# Patient Record
Sex: FEMALE | Race: WHITE | NOT HISPANIC OR LATINO | Employment: FULL TIME | URBAN - METROPOLITAN AREA
[De-identification: names, ages, dates, MRNs, and addresses within clinical notes are randomized per-mention and may not be internally consistent; named-entity substitution may affect disease eponyms.]

---

## 2017-01-18 ENCOUNTER — MYC REFILL (OUTPATIENT)
Dept: FAMILY MEDICINE | Facility: CLINIC | Age: 37
End: 2017-01-18

## 2017-01-18 DIAGNOSIS — F33.1 MODERATE RECURRENT MAJOR DEPRESSION (H): Primary | ICD-10-CM

## 2017-01-18 DIAGNOSIS — F33.1 MODERATE RECURRENT MAJOR DEPRESSION (H): ICD-10-CM

## 2017-01-18 RX ORDER — BUPROPION HYDROCHLORIDE 300 MG/1
300 TABLET ORAL EVERY MORNING
Qty: 90 TABLET | Refills: 3 | Status: CANCELLED | OUTPATIENT
Start: 2017-01-18

## 2017-01-18 RX ORDER — BUPROPION HYDROCHLORIDE 300 MG/1
300 TABLET ORAL EVERY MORNING
Qty: 90 TABLET | Refills: 0 | Status: SHIPPED | OUTPATIENT
Start: 2017-01-18 | End: 2017-04-20

## 2017-01-18 NOTE — TELEPHONE ENCOUNTER
Bupropion/ wellbutrin XL       Last Written Prescription Date: 1/18/17  Last Fill Quantity: 90; # refills: 0  Last Office Visit with [facility], [facility] or [facility] prescribing provider:  9/2/16   Next 5 appointments (look out 90 days)     Mar 21, 2017 11:00 AM   Return Visit with Andrea Benítez MD   [facility] ([facility])    [address] [place] [id number] [phone]                   Last PHQ-9 score on record=   PHQ-9 SCORE 9/2/2016   Total Score -   Total Score 2       AST       23   12/15/2016  ALT       31   12/15/2016  Corinna Larson RNC

## 2017-01-18 NOTE — TELEPHONE ENCOUNTER
Bupropion       Last Written Prescription Date: 12/31/15  Last Fill Quantity: 90; # refills: 3  Last Office Visit with [facility], [facility] or [facility] prescribing provider:  09/02/16   Next 5 appointments (look out 90 days)     Mar 21, 2017 11:00 AM   Return Visit with Andrea Benítez MD   [facility] ([facility]    [address] [place] [id number] [phone]                   Last PHQ-9 score on record=   PHQ-9 SCORE 9/2/2016   Total Score -   Total Score 2       AST       23   12/15/2016  ALT       31   12/15/2016

## 2017-01-18 NOTE — TELEPHONE ENCOUNTER
Message from [employer]:  Original authorizing provider: Fco Medeiros MD    Vida Whitfield would like a refill of the following medications:  buPROPion (WELLBUTRIN XL) 300 MG 24 hr tablet [Fco Medeiros MD]    Preferred pharmacy: [facility] - [place] - [address]    Comment:

## 2017-01-26 DIAGNOSIS — M06.09 RHEUMATOID ARTHRITIS OF MULTIPLE SITES WITH NEGATIVE RHEUMATOID FACTOR (H): ICD-10-CM

## 2017-01-26 DIAGNOSIS — Z79.899 HIGH RISK MEDICATION USE: ICD-10-CM

## 2017-01-26 LAB
ALBUMIN SERPL-MCNC: 3.5 G/DL (ref 3.4–5)
ALP SERPL-CCNC: 41 U/L (ref 40–150)
ALT SERPL W P-5'-P-CCNC: 20 U/L (ref 0–50)
AST SERPL W P-5'-P-CCNC: 15 U/L (ref 0–45)
BASOPHILS # BLD AUTO: 0 10E9/L (ref 0–0.2)
BASOPHILS NFR BLD AUTO: 0.6 %
BILIRUB DIRECT SERPL-MCNC: 0.1 MG/DL (ref 0–0.2)
BILIRUB SERPL-MCNC: 0.4 MG/DL (ref 0.2–1.3)
CREAT SERPL-MCNC: 1.12 MG/DL (ref 0.52–1.04)
DIFFERENTIAL METHOD BLD: NORMAL
EOSINOPHIL # BLD AUTO: 0 10E9/L (ref 0–0.7)
EOSINOPHIL NFR BLD AUTO: 0 %
ERYTHROCYTE [DISTWIDTH] IN BLOOD BY AUTOMATED COUNT: 13.7 % (ref 10–15)
GFR SERPL CREATININE-BSD FRML MDRD: 55 ML/MIN/1.7M2
HCT VFR BLD AUTO: 41.3 % (ref 35–47)
HGB BLD-MCNC: 13.9 G/DL (ref 11.7–15.7)
LYMPHOCYTES # BLD AUTO: 3 10E9/L (ref 0.8–5.3)
LYMPHOCYTES NFR BLD AUTO: 46.8 %
MCH RBC QN AUTO: 30.5 PG (ref 26.5–33)
MCHC RBC AUTO-ENTMCNC: 33.7 G/DL (ref 31.5–36.5)
MCV RBC AUTO: 91 FL (ref 78–100)
MONOCYTES # BLD AUTO: 0.8 10E9/L (ref 0–1.3)
MONOCYTES NFR BLD AUTO: 12.1 %
NEUTROPHILS # BLD AUTO: 2.6 10E9/L (ref 1.6–8.3)
NEUTROPHILS NFR BLD AUTO: 40.5 %
PLATELET # BLD AUTO: 189 10E9/L (ref 150–450)
PROT SERPL-MCNC: 7.1 G/DL (ref 6.8–8.8)
RBC # BLD AUTO: 4.56 10E12/L (ref 3.8–5.2)
WBC # BLD AUTO: 6.4 10E9/L (ref 4–11)

## 2017-01-26 PROCEDURE — 82565 ASSAY OF CREATININE: CPT | Performed by: INTERNAL MEDICINE

## 2017-01-26 PROCEDURE — 80076 HEPATIC FUNCTION PANEL: CPT | Performed by: INTERNAL MEDICINE

## 2017-01-26 PROCEDURE — 36415 COLL VENOUS BLD VENIPUNCTURE: CPT | Performed by: INTERNAL MEDICINE

## 2017-01-26 PROCEDURE — 85025 COMPLETE CBC W/AUTO DIFF WBC: CPT | Performed by: INTERNAL MEDICINE

## 2017-02-16 ENCOUNTER — TELEPHONE (OUTPATIENT)
Dept: LAB | Facility: CLINIC | Age: 37
End: 2017-02-16

## 2017-02-16 DIAGNOSIS — Z79.899 HIGH RISK MEDICATION USE: ICD-10-CM

## 2017-02-16 DIAGNOSIS — M06.09 RHEUMATOID ARTHRITIS OF MULTIPLE SITES WITH NEGATIVE RHEUMATOID FACTOR (H): ICD-10-CM

## 2017-02-16 LAB
ALBUMIN SERPL-MCNC: 3.6 G/DL (ref 3.4–5)
ALP SERPL-CCNC: 45 U/L (ref 40–150)
ALT SERPL W P-5'-P-CCNC: 25 U/L (ref 0–50)
AST SERPL W P-5'-P-CCNC: 15 U/L (ref 0–45)
BASOPHILS # BLD AUTO: 0 10E9/L (ref 0–0.2)
BASOPHILS NFR BLD AUTO: 0.5 %
BILIRUB DIRECT SERPL-MCNC: <0.1 MG/DL (ref 0–0.2)
BILIRUB SERPL-MCNC: 0.5 MG/DL (ref 0.2–1.3)
CREAT SERPL-MCNC: 1.08 MG/DL (ref 0.52–1.04)
CRP SERPL-MCNC: 11.1 MG/L (ref 0–8)
DIFFERENTIAL METHOD BLD: NORMAL
EOSINOPHIL # BLD AUTO: 0 10E9/L (ref 0–0.7)
EOSINOPHIL NFR BLD AUTO: 0 %
ERYTHROCYTE [DISTWIDTH] IN BLOOD BY AUTOMATED COUNT: 13.8 % (ref 10–15)
ERYTHROCYTE [SEDIMENTATION RATE] IN BLOOD BY WESTERGREN METHOD: 6 MM/H (ref 0–20)
GFR SERPL CREATININE-BSD FRML MDRD: 57 ML/MIN/1.7M2
HCT VFR BLD AUTO: 41.4 % (ref 35–47)
HGB BLD-MCNC: 14 G/DL (ref 11.7–15.7)
LYMPHOCYTES # BLD AUTO: 2.9 10E9/L (ref 0.8–5.3)
LYMPHOCYTES NFR BLD AUTO: 45.4 %
MCH RBC QN AUTO: 30.9 PG (ref 26.5–33)
MCHC RBC AUTO-ENTMCNC: 33.8 G/DL (ref 31.5–36.5)
MCV RBC AUTO: 91 FL (ref 78–100)
MONOCYTES # BLD AUTO: 0.8 10E9/L (ref 0–1.3)
MONOCYTES NFR BLD AUTO: 11.9 %
NEUTROPHILS # BLD AUTO: 2.7 10E9/L (ref 1.6–8.3)
NEUTROPHILS NFR BLD AUTO: 42.2 %
PLATELET # BLD AUTO: 194 10E9/L (ref 150–450)
PROT SERPL-MCNC: 7.1 G/DL (ref 6.8–8.8)
RBC # BLD AUTO: 4.53 10E12/L (ref 3.8–5.2)
WBC # BLD AUTO: 6.4 10E9/L (ref 4–11)

## 2017-02-16 PROCEDURE — 85652 RBC SED RATE AUTOMATED: CPT | Performed by: INTERNAL MEDICINE

## 2017-02-16 PROCEDURE — 86140 C-REACTIVE PROTEIN: CPT | Performed by: INTERNAL MEDICINE

## 2017-02-16 PROCEDURE — 80076 HEPATIC FUNCTION PANEL: CPT | Performed by: INTERNAL MEDICINE

## 2017-02-16 PROCEDURE — 36415 COLL VENOUS BLD VENIPUNCTURE: CPT | Performed by: INTERNAL MEDICINE

## 2017-02-16 PROCEDURE — 85025 COMPLETE CBC W/AUTO DIFF WBC: CPT | Performed by: INTERNAL MEDICINE

## 2017-02-16 PROCEDURE — 82565 ASSAY OF CREATININE: CPT | Performed by: INTERNAL MEDICINE

## 2017-02-19 DIAGNOSIS — M06.09 RHEUMATOID ARTHRITIS OF MULTIPLE SITES WITH NEGATIVE RHEUMATOID FACTOR (H): ICD-10-CM

## 2017-02-19 RX ORDER — METHOTREXATE 2.5 MG/1
15 TABLET ORAL WEEKLY
Qty: 72 TABLET | Refills: 0 | Status: SHIPPED | OUTPATIENT
Start: 2017-02-19 | End: 2017-03-29

## 2017-02-19 NOTE — PROGRESS NOTES
"MyChart message sent:  \"Ms. Whitfield,    Your labs show a persistent renal insufficiency.   Please reduce methotrexate to 15mg (6 tablets) once weekly.      Sincerely,  Andrea Benítez MD  2/19/2017 7:45 AM\""

## 2017-03-21 ENCOUNTER — TELEPHONE (OUTPATIENT)
Dept: LAB | Facility: CLINIC | Age: 37
End: 2017-03-21

## 2017-03-21 ENCOUNTER — MYC MEDICAL ADVICE (OUTPATIENT)
Dept: FAMILY MEDICINE | Facility: CLINIC | Age: 37
End: 2017-03-21

## 2017-03-21 ENCOUNTER — MYC REFILL (OUTPATIENT)
Dept: FAMILY MEDICINE | Facility: CLINIC | Age: 37
End: 2017-03-21

## 2017-03-21 DIAGNOSIS — M19.90 INFLAMMATORY ARTHRITIS: Primary | ICD-10-CM

## 2017-03-21 DIAGNOSIS — F33.1 MODERATE RECURRENT MAJOR DEPRESSION (H): ICD-10-CM

## 2017-03-21 DIAGNOSIS — Z79.899 HIGH RISK MEDICATION USE: ICD-10-CM

## 2017-03-21 RX ORDER — BUSPIRONE HYDROCHLORIDE 15 MG/1
15 TABLET ORAL 2 TIMES DAILY
Qty: 180 TABLET | Refills: 1 | Status: SHIPPED | OUTPATIENT
Start: 2017-03-21 | End: 2017-08-30

## 2017-03-21 ASSESSMENT — ANXIETY QUESTIONNAIRES
GAD7 TOTAL SCORE: 3
GAD7 TOTAL SCORE: 3
7. FEELING AFRAID AS IF SOMETHING AWFUL MIGHT HAPPEN: 0 = NOT AT ALL

## 2017-03-21 ASSESSMENT — PATIENT HEALTH QUESTIONNAIRE - PHQ9
10. IF YOU CHECKED OFF ANY PROBLEMS, HOW DIFFICULT HAVE THESE PROBLEMS MADE IT FOR YOU TO DO YOUR WORK, TAKE CARE OF THINGS AT HOME, OR GET ALONG WITH OTHER PEOPLE: SOMEWHAT DIFFICULT
SUM OF ALL RESPONSES TO PHQ QUESTIONS 1-9: 6

## 2017-03-21 NOTE — TELEPHONE ENCOUNTER
PHQ-9 score:    PHQ-9 SCORE 3/21/2017   Total Score -   Total Score MyChart 6 (Mild depression)   Total Score -           JANET-7 SCORE 12/31/2015 9/2/2016 3/21/2017   Total Score - - -   Total Score - - 3 (minimal anxiety)   Total Score 0 1 -     Refilled per protocol.   Corinna Larson RNC

## 2017-03-21 NOTE — TELEPHONE ENCOUNTER
Buspirone/ buspar       Last Written Prescription Date: 12/31/15  Last Fill Quantity: 180; # refills: 3  Last Office Visit with [facility], [facility] or  [facility] prescribing provider:  9/2/16   Next 5 appointments (look out 90 days)     Mar 22, 2017  2:20 PM CDT   Return Visit with Andrea Benítez MD   [facility] ([place] [id number] [facility] [address]   [phone]                   Last PHQ-9 score on record=   PHQ-9 SCORE 9/2/2016   Total Score -   Total Score 2       Lab Results   Component Value Date    AST 15 02/16/2017     Lab Results   Component Value Date    ALT 25 02/16/2017     Sent her the PHQ9 and GAD7 to complete and return for refill consideration, per protocol.   Corinna Larson RNC

## 2017-03-21 NOTE — TELEPHONE ENCOUNTER
Message from [employer]:  Original authorizing provider: Fco Medeiros MD    Vida Whitfield would like a refill of the following medications:  busPIRone (BUSPAR) 15 MG tablet [Fco Medeiros MD]    Preferred pharmacy: [facility] - [place], [place], [place] - [address]    Comment:

## 2017-03-22 DIAGNOSIS — Z79.899 HIGH RISK MEDICATION USE: ICD-10-CM

## 2017-03-22 DIAGNOSIS — M19.90 INFLAMMATORY ARTHRITIS: ICD-10-CM

## 2017-03-22 LAB
ALBUMIN SERPL-MCNC: 3.7 G/DL (ref 3.4–5)
ALP SERPL-CCNC: 56 U/L (ref 40–150)
ALT SERPL W P-5'-P-CCNC: 41 U/L (ref 0–50)
AST SERPL W P-5'-P-CCNC: 23 U/L (ref 0–45)
BASOPHILS # BLD AUTO: 0.1 10E9/L (ref 0–0.2)
BASOPHILS NFR BLD AUTO: 0.9 %
BILIRUB DIRECT SERPL-MCNC: 0.1 MG/DL (ref 0–0.2)
BILIRUB SERPL-MCNC: 0.5 MG/DL (ref 0.2–1.3)
CREAT SERPL-MCNC: 0.94 MG/DL (ref 0.52–1.04)
CRP SERPL-MCNC: 4 MG/L (ref 0–8)
DIFFERENTIAL METHOD BLD: NORMAL
EOSINOPHIL # BLD AUTO: 0 10E9/L (ref 0–0.7)
EOSINOPHIL NFR BLD AUTO: 0.3 %
ERYTHROCYTE [DISTWIDTH] IN BLOOD BY AUTOMATED COUNT: 13.7 % (ref 10–15)
ERYTHROCYTE [SEDIMENTATION RATE] IN BLOOD BY WESTERGREN METHOD: 5 MM/H (ref 0–20)
GFR SERPL CREATININE-BSD FRML MDRD: 68 ML/MIN/1.7M2
HCT VFR BLD AUTO: 40.6 % (ref 35–47)
HGB BLD-MCNC: 14.3 G/DL (ref 11.7–15.7)
LYMPHOCYTES # BLD AUTO: 2.8 10E9/L (ref 0.8–5.3)
LYMPHOCYTES NFR BLD AUTO: 41.5 %
MCH RBC QN AUTO: 32.4 PG (ref 26.5–33)
MCHC RBC AUTO-ENTMCNC: 35.2 G/DL (ref 31.5–36.5)
MCV RBC AUTO: 92 FL (ref 78–100)
MONOCYTES # BLD AUTO: 0.7 10E9/L (ref 0–1.3)
MONOCYTES NFR BLD AUTO: 9.5 %
NEUTROPHILS # BLD AUTO: 3.3 10E9/L (ref 1.6–8.3)
NEUTROPHILS NFR BLD AUTO: 47.8 %
PLATELET # BLD AUTO: 176 10E9/L (ref 150–450)
PROT SERPL-MCNC: 7 G/DL (ref 6.8–8.8)
RBC # BLD AUTO: 4.41 10E12/L (ref 3.8–5.2)
WBC # BLD AUTO: 6.8 10E9/L (ref 4–11)

## 2017-03-22 PROCEDURE — 86140 C-REACTIVE PROTEIN: CPT | Performed by: INTERNAL MEDICINE

## 2017-03-22 PROCEDURE — 85652 RBC SED RATE AUTOMATED: CPT | Performed by: INTERNAL MEDICINE

## 2017-03-22 PROCEDURE — 80076 HEPATIC FUNCTION PANEL: CPT | Performed by: INTERNAL MEDICINE

## 2017-03-22 PROCEDURE — 82565 ASSAY OF CREATININE: CPT | Performed by: INTERNAL MEDICINE

## 2017-03-22 PROCEDURE — 36415 COLL VENOUS BLD VENIPUNCTURE: CPT | Performed by: INTERNAL MEDICINE

## 2017-03-22 PROCEDURE — 85025 COMPLETE CBC W/AUTO DIFF WBC: CPT | Performed by: INTERNAL MEDICINE

## 2017-03-22 ASSESSMENT — ANXIETY QUESTIONNAIRES: GAD7 TOTAL SCORE: 3

## 2017-03-22 ASSESSMENT — PATIENT HEALTH QUESTIONNAIRE - PHQ9: SUM OF ALL RESPONSES TO PHQ QUESTIONS 1-9: 6

## 2017-03-29 ENCOUNTER — OFFICE VISIT (OUTPATIENT)
Dept: RHEUMATOLOGY | Facility: CLINIC | Age: 37
End: 2017-03-29
Payer: COMMERCIAL

## 2017-03-29 VITALS
WEIGHT: 223.4 LBS | BODY MASS INDEX: 31.98 KG/M2 | OXYGEN SATURATION: 98 % | HEART RATE: 71 BPM | SYSTOLIC BLOOD PRESSURE: 127 MMHG | HEIGHT: 70 IN | DIASTOLIC BLOOD PRESSURE: 75 MMHG

## 2017-03-29 DIAGNOSIS — M06.09 RHEUMATOID ARTHRITIS OF MULTIPLE SITES WITH NEGATIVE RHEUMATOID FACTOR (H): Primary | ICD-10-CM

## 2017-03-29 DIAGNOSIS — Z79.899 HIGH RISK MEDICATION USE: ICD-10-CM

## 2017-03-29 PROCEDURE — 99213 OFFICE O/P EST LOW 20 MIN: CPT | Performed by: INTERNAL MEDICINE

## 2017-03-29 RX ORDER — SULFASALAZINE 500 MG/1
1000 TABLET, DELAYED RELEASE ORAL 2 TIMES DAILY
Qty: 360 TABLET | Refills: 1 | Status: SHIPPED | OUTPATIENT
Start: 2017-03-29 | End: 2017-07-14

## 2017-03-29 RX ORDER — METHOTREXATE 2.5 MG/1
15 TABLET ORAL WEEKLY
Qty: 72 TABLET | Refills: 1 | Status: SHIPPED | OUTPATIENT
Start: 2017-03-29 | End: 2017-07-14

## 2017-03-29 NOTE — MR AVS SNAPSHOT
After Visit Summary   3/29/2017    Vida Whitfield    MRN: [id number]           Patient Information     Date Of Birth          1980        Visit Information        Provider Department      3/29/2017 1:40 PM Andrea Benítez MD [facility]        Today's Diagnoses     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 factor (H)    -  1    High risk medication use          Care Instructions      Dr. Benítez s Rheumatology Clinics  Locations Clinic Hours Telephone Number   [facility] [place]  [address]  [place] [address]     Wednesday: 7:20AM - 4:00PM  Thursday:     7:20AM - 4:00PM  Friday:          7:20AM - 11:00AM       To schedule an appointment with  Dr. Benítez,  please contact  Specialty [phone]       [facility]  [address]  [place] [address]       Monday:       7:20AM - 4:00PM      [facility] [place]  [address]  [place] [address]       Tuesday:      7:20AM - 4:00PM          Thank you!    Nicolette Sarmiento CMA            Follow-ups after your visit        Follow-up notes from your care team     Return in about 3 months (around 2017) for f/u RA.      Your next 10 appointments already scheduled     2017  1:40 PM CDT   Return Visit with Andrea Benítez MD   [facility] ([facility])    [address] [address]   [phone]              Future tests that were ordered for you today     Open Future Orders        Priority Expected Expires Ordered    CBC with platelets differential Routine 2017 2017 3/29/2017    Creatinine Routine 2017 2017 3/29/2017    Erythrocyte sedimentation rate auto Routine 2017 2017 3/29/2017    CRP inflammation Routine 2017 2017 3/29/2017    Hepatic panel Routine 2017 2017 3/29/2017            Who to contact     If you have questions or need follow up information about today's  "clinic visit or your schedule please contact [facility] [place] [facility] directly at [phone].  Normal or non-critical lab and imaging results will be communicated to you by MyChart, letter or phone within 4 business days after the clinic has received the results. If you do not hear from us within 7 days, please contact the clinic through [employer] or phone. If you have a critical or abnormal lab result, we will notify you by phone as soon as possible.  Submit refill requests through [employer] or call your pharmacy and they will forward the refill request to us. Please allow 3 business days for your refill to be completed.          Additional Information About Your Visit        [employer]et Information     [employer] gives you secure access to your electronic health record. If you see a primary care provider, you can also send messages to your care team and make appointments. If you have questions, please call your primary care clinic.  If you do not have a primary care provider, please call [phone] and they will assist you.        Care EveryWhere ID     This is your Care EveryWhere ID. This could be used by other organizations to access your [facility] medical records  [id number]        Your Vitals Were     Pulse Height Pulse Oximetry BMI (Body Mass Index)          71 1.778 m (5' 10\") 98% 32.05 kg/m2         Blood Pressure from Last 3 Encounters:   03/29/17 127/75   12/20/16 121/75   09/20/16 133/75    Weight from Last 3 Encounters:   03/29/17 101.3 kg (223 lb 6.4 oz)   12/20/16 99.7 kg (219 lb 12.8 oz)   09/20/16 98.4 kg (217 lb)                 Today's Medication Changes          These changes are accurate as of: 3/29/17  2:08 PM.  If you have any questions, ask your nurse or doctor.               These medicines have changed or have updated prescriptions.        Dose/Directions    sulfaSALAzine  MG EC tablet   Commonly known as:  AZULFIDINE EN   This may have changed:    - how much to take  - how to take this  - " when to take this  - additional instructions   Used for:  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 factor (H)   Changed by:  Andrea Benítez MD        Dose:  1000 mg   Take 2 tablets (1,000 mg) by mouth 2 times daily   Quantity:  360 tablet   Refills:  1            Where to get your medicines      These medications were sent to [facility] [place] - [place], [place] - [address]  [address] [address]     Phone:  [phone]     methotrexate sodium 2.5 MG Tabs    sulfaSALAzine  MG EC tablet                Primary Care Provider Office Phone # Fax #    Fco Joby Medeiros [phone]       [facility] [address] [address]        Thank you!     Thank you for choosing [facility]  for your care. Our goal is always to provide you with excellent care. Hearing back from our patients is one way we can continue to improve our services. Please take a few minutes to complete the written survey that you may receive in the mail after your visit with us. Thank you!             Your Updated Medication List - Protect others around you: Learn how to safely use, store and throw away your medicines at www.disposemymeds.org.          This list is accurate as of: 3/29/17  2:08 PM.  Always use your most recent med list.                   Brand Name Dispense Instructions for use    ACIDOPHILUS PROBIOTIC FORMULA Tabs      Take 1 tablet by mouth daily       buPROPion 300 MG 24 hr tablet    WELLBUTRIN XL    90 tablet    Take 1 tablet (300 mg) by mouth every morning       busPIRone 15 MG tablet    BUSPAR    180 tablet    Take 1 tablet (15 mg) by mouth 2 times daily       clindamycin 1 % solution    CLEOCIN T    60 mL    Apply topically 2 times daily       fish oil-omega-3 fatty acids 1000 MG capsule     90 capsule    Take 2 capsules (2 g) by mouth daily       folic acid 1 MG tablet    FOLVITE    100 tablet    Take 1 tablet (1 mg)  by mouth daily       levonorgestrel-ethinyl estradiol 0.15-0.03 MG per tablet    SEASONALE    90 tablet    Take 1 tablet by mouth daily       levothyroxine 175 MCG tablet    SYNTHROID/LEVOTHROID    30 tablet    Take 1 tablet (175 mcg) by mouth daily       methotrexate sodium 2.5 MG Tabs     72 tablet    Take 15 mg by mouth once a week . Take all 6 tablets on the same day of each week.       multivitamin per tablet     100 tablet    Take 1 tablet by mouth daily.       order for DME      Equipment being ordered: CPAP 6-10 cm       sulfaSALAzine  MG EC tablet    AZULFIDINE EN    360 tablet    Take 2 tablets (1,000 mg) by mouth 2 times daily       traZODone 50 MG tablet    DESYREL    180 tablet    Take 1-2 tablets by mouth at bedtime.       ZYRTEC ALLERGY 10 MG tablet   Generic drug:  cetirizine      Take 20 mg by mouth daily.

## 2017-03-29 NOTE — PATIENT INSTRUCTIONS
Dr. Benítez s Rheumatology Clinics  Locations Clinic Hours Telephone Number   [facility] [place]  [address]  [place] [address]     Wednesday: 7:20AM - 4:00PM  Thursday:     7:20AM - 4:00PM  Friday:          7:20AM - 11:00AM       To schedule an appointment with  Dr. Benítez,  please contact  Specialty [phone]       [facility] [place]  [address]  [place] [address]       Monday:       7:20AM - 4:00PM      [facility] [place]  [address]  [place] [address]       Tuesday:      7:20AM - 4:00PM          Thank you!    Nicolette Sarmiento CMA

## 2017-03-29 NOTE — NURSING NOTE
"Chief Complaint   Patient presents with     RECHECK     patient states she is doing pretty good       Initial /75 (BP Location: Left arm, Patient Position: Chair, Cuff Size: Adult Large)  Pulse 71  Ht 1.778 m (5' 10\")  Wt 101.3 kg (223 lb 6.4 oz)  SpO2 98%  BMI 32.05 kg/m2 Estimated body mass index is 32.05 kg/(m^2) as calculated from the following:    Height as of this encounter: 1.778 m (5' 10\").    Weight as of this encounter: 101.3 kg (223 lb 6.4 oz).  BP completed using cuff size: large         RAPID3 (0-30) Cumulative Score  4.0          RAPID3 Weighted Score (divide #4 by 3 and that is the weighted score)  1.33         "

## 2017-03-29 NOTE — PROGRESS NOTES
Rheumatology Clinic Visit      Vida Whitfield MRN# [id number]   YOB: 1980 Age: 37 year old      Date of visit: 3/29/17   PCP: Dr. Fco Medeiros    Chief Complaint   Patient presents with:  RECHECK: patient states she is doing pretty good      Assessment and Plan     1. Seronegative nonerosive rheumatoid arthritis: Previously followed at [facility]. Currently on methotrexate 15 mg once weekly (Cr elevation possibly secondary to MTX so MTX was reduced with improvement of Cr), folic acid 1 mg daily, and SSZ 1000mg BID. Doing well today with the exception of occasional hand pain, and specifically her left second MCP that occurs on an infrequent basis. She has been on sulfasalazine for 3 months now and I expect continued improvement with a longer duration of therapy. If she is still symptomatic at her next clinic visit, then may consider adding hydroxychloroquine or increasing sulfasalazine to 1500 mg twice daily.  For her knees, she uses voltaren gel PRN but has been doing well since the previous intra-articular steroid injections.  Note that NSAIDs appear to be triggers for her urticaria.   - Continue methotrexate 20 mg once weekly  - Continue folic acid 1 mg daily  - Continue sulfasalazine 1000mg BID   - Avoid NSAIDs because they are historically triggers for urticaria  - Labs 2-3 days prior to next rheumatology clinic visit: CBC, creatinine, hepatic panel, ESR, CRP    # On birth control, per patient    2. Bilateral patellofemoral syndrome: Previous steroid injections on 9/20/2016 were beneficial; no knee pain reported today.     3. Urticaria: Historically, NSAIDs are triggers    4. Bone health: 9/20/2016 Vitamin D level normal.     5. Vaccinations:  - Influenza: up to date   - [id number]: up to date  - [id number]: up to date    Ms. Whitfield verbalized agreement with and understanding of the rational for the diagnosis and treatment plan.  All questions were answered to best of my ability and the  patient's satisfaction. Ms. Whitfield was advised to contact the clinic with any questions that may arise after the clinic visit.      Thank you for involving me in the care of the patient    Return to clinic: 3 months      HPI   Vida Whitfield is a 37 year old female with a medical history significant for anxiety, depression, chronic idiopathic urticaria, hyperlipidemia, hypothyroidism, rosacea, obstructive sleep apnea, obesity, and inflammatory arthritis who presents for follow-up of rheumatoid arthritis.    Previously documented historical records in this paragraph: She was previously evaluated by Dr. Blaine Anthony at [facility].  2014 labs show negative: hepatitis C ab, HBV surface ag, HBV core ab, HLA-B27, Sm, RNP, SSA, SSB, Scl-70, DNA, cardiolipin, CCP, PR3, MPO. NIKKIE 1:160 homogeneous.  MPO also positive (one negative and one positive value). Per a 3/11/2016 clinic note, she has seronegative rheumatoid arthritis and chronic urticaria. She developed joint swelling and stiffness in the bilateral MCPs, MTPs, ankles, and knees that started in November 2013 shortly after tapering off prednisone that was used to manage chronic urticaria. No history of psoriasis or inflammatory bowel disease. Negative SI joint x-rays. Negative MRI of the SI joint. Flaring of her urticaria with NSAIDs. Steroid responsive in regard to her joints. Near resolution of joint stiffness and pain with methotrexate. NSAIDs are avoided because they cause flares of her urticaria.    Today, Ms. Whitfield reports that this is probably the best she has been in a long time. Occasional left second MCP pain and swelling, and general hand pain that occurs for 1-2 days, every 2 weeks on average. She feels like the addition of sulfasalazine has resulted in improvement of her arthritis. Morning stiffness for approximately 30-60 minutes. Knee pain has not recurred since the previous steroid injections in September 2016.     No urticaria recently. NSAIDs appear  to be triggers for urticaria.    Denies fevers, chills, nausea, vomiting, constipation, diarrhea. No abdominal pain. No chest pain/pressure, palpitations, or shortness of breath.  No neck pain.     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  CV: No chest pain, pressure, palpitations, or dyspnea on exertion.  PULM: No SOB, wheeze, cough.  GI: No nausea, vomiting, constipation, diarrhea. No blood in stool. No abdominal pain.  : No blood in urine.  MSK: See HPI.  NEURO: Negative  PSYCH: Negative    Active Problem List     Patient Active Problem List   Diagnosis     Contraceptive management     Hyperhidrosis of axilla     CARDIOVASCULAR SCREENING; LDL GOAL LESS THAN 160     Generalized anxiety disorder     Moderate recurrent major depression (H)     Chronic idiopathic urticaria     Hyperlipidemia with target LDL less than 130     Inflammatory arthritis     Hypothyroidism, unspecified hypothyroidism type     Rosacea     Non morbid obesity due to excess calories     BRIDGETTE (obstructive sleep apnea)     Past Medical History     Past Medical History:   Diagnosis Date     Peripheral autonomic neuropathy in disorders classified elsewhere(337.1)      Pure hypercholesterolemia      Past Surgical History     Past Surgical History:   Procedure Laterality Date     C APPENDECTOMY  2002     CHOLECYSTECTOMY, LAPOROSCOPIC  2006    Cholecystectomy, Laparoscopic     SURGICAL HISTORY OF -   17 years old    Thyroid ablation     Allergy     Allergies   Allergen Reactions     Macrobid [Nitrofuran Derivatives]      Paxil [Paroxetine]      Anxiety         Prednisone      Vomiting      Zoloft      Mood changes     Current Medication List     Current Outpatient Prescriptions   Medication Sig     busPIRone (BUSPAR) 15 MG tablet Take 1 tablet (15 mg) by  mouth 2 times daily     methotrexate sodium 2.5 MG TABS Take 15 mg by mouth once a week . Take all 6 tablets on the same day of each week.     buPROPion (WELLBUTRIN XL) 300 MG 24 hr tablet Take 1 tablet (300 mg) by mouth every morning     sulfaSALAzine ER (AZULFIDINE EN) 500 MG EC tablet 500mg BID for 7 days, then increase to 1000mg BID and continue 1000mg BID thereafter.     folic acid (FOLVITE) 1 MG tablet Take 1 tablet (1 mg) by mouth daily     levonorgestrel-ethinyl estradiol (SEASONALE) 0.15-0.03 MG per tablet Take 1 tablet by mouth daily     order for DME Equipment being ordered: CPAP 6-10 cm     levothyroxine (SYNTHROID, LEVOTHROID) 175 MCG tablet Take 1 tablet (175 mcg) by mouth daily     traZODone (DESYREL) 50 MG tablet Take 1-2 tablets by mouth at bedtime.     clindamycin (CLEOCIN T) 1 % external solution Apply topically 2 times daily     fish oil-omega-3 fatty acids (OMEGA 3) 1000 MG capsule Take 2 capsules (2 g) by mouth daily     Lactobacillus (ACIDOPHILUS PROBIOTIC FORMULA) TABS Take 1 tablet by mouth daily     cetirizine (ZYRTEC ALLERGY) 10 MG tablet Take 20 mg by mouth daily.     Multiple Vitamin (MULTIVITAMIN) per tablet Take 1 tablet by mouth daily.     No current facility-administered medications for this visit.          Social History   See HPI    Family History     Family History   Problem Relation Age of Onset     DIABETES Mother      Hypertension Mother      Lipids Mother      Hypertension Father      Lipids Father      Thyroid Disease Father      CANCER Father      DIABETES Maternal Grandmother        Physical Exam     Temp Readings from Last 3 Encounters:   09/20/16 98.3  F (36.8  C) (Oral)   09/02/16 98.2  F (36.8  C) (Tympanic)   05/27/16 98.9  F (37.2  C) (Tympanic)     BP Readings from Last 5 Encounters:   03/29/17 127/75   12/20/16 121/75   09/20/16 133/75   09/02/16 115/64   08/30/16 114/76     Pulse Readings from Last 1 Encounters:   03/29/17 71     Resp Readings from Last 1  "Encounters:   01/27/12 18     Estimated body mass index is 32.05 kg/(m^2) as calculated from the following:    Height as of this encounter: 1.778 m (5' 10\").    Weight as of this encounter: 101.3 kg (223 lb 6.4 oz).    GEN: NAD, overweight  HEENT: MMM.  Anicteric, noninjected sclera  CV: S1, S2. RRR. No m/r/g.  PULM: CTA bilaterally. No w/c.  MSK:  MCPs, PIPs, DIPs, wrists, elbows, shoulders, knees, ankles, and feet without swelling or tenderness to palpation. Negative MCP squeeze. Negative MTP squeeze. Hips nontender to direct palpation.   SKIN: No rash  EXT: No LE edema  PSYCH: Alert. Appropriate.    Labs / Imaging (select studies)     CBC  Recent Labs   Lab Test  03/22/17   1222  02/16/17   0747  01/26/17   0745   WBC  6.8  6.4  6.4   RBC  4.41  4.53  4.56   HGB  14.3  14.0  13.9   HCT  40.6  41.4  41.3   MCV  92  91  91   RDW  13.7  13.8  13.7   PLT  176  194  189   MCH  32.4  30.9  30.5   MCHC  35.2  33.8  33.7   NEUTROPHIL  47.8  42.2  40.5   LYMPH  41.5  45.4  46.8   MONOCYTE  9.5  11.9  12.1   EOSINOPHIL  0.3  0.0  0.0   BASOPHIL  0.9  0.5  0.6   ANEU  3.3  2.7  2.6   ALYM  2.8  2.9  3.0   FREDA  0.7  0.8  0.8   AEOS  0.0  0.0  0.0   ABAS  0.1  0.0  0.0     CMP  Recent Labs   Lab Test  03/22/17   1222  02/16/17   0747  01/26/17   0745  12/20/16   1138   09/02/16   0736  06/19/14   0850   01/26/12 2015   NA   --    --    --   138   --    --    --    --   140   POTASSIUM   --    --    --   4.4   --    --    --    --   4.0   CHLORIDE   --    --    --   104   --    --    --    --   104   CO2   --    --    --   30   --    --    --    --   26   ANIONGAP   --    --    --   4   --    --    --    --   10   GLC   --    --    --   84   --   88  91   < >  90   BUN   --    --    --   13   --    --    --    --   10   CR  0.94  1.08*  1.12*  1.05*   < >   --    --    --   0.79   GFRESTIMATED  68  57*  55*  59*   < >   --    --    --   85   GFRESTBLACK  82  70  67  72   < >   --    --    --   >90   TATI   --    --    --   " 8.7   --    --    --    --   8.9   BILITOTAL  0.5  0.5  0.4   --    < >   --    --    --   0.4   ALBUMIN  3.7  3.6  3.5   --    < >   --    --    --   4.1   PROTTOTAL  7.0  7.1  7.1   --    < >   --    --    --   7.5   ALKPHOS  56  45  41   --    < >   --    --    --   56   AST  23  15  15   --    < >   --    --    --   27   ALT  41  25  20   --    < >   --    --    --   21    < > = values in this interval not displayed.     Calcium/VitaminD  Recent Labs   Lab Test  12/20/16   1138  09/20/16   1554  01/26/12   2015   TATI  8.7   --   8.9   VITDT   --   36   --      ESR/CRP  Recent Labs   Lab Test  03/22/17   1222  02/16/17   0747  09/20/16   1554   SED  5  6  7   CRP  4.0  11.1*  19.1*     TSH/T4  Recent Labs   Lab Test  06/22/16   1232  05/20/16   0732  05/21/15   0850   TSH  0.07*  0.03*  0.68   T4  1.27  1.58*  1.37     Lipid Panel  Recent Labs   Lab Test  09/02/16   0736  06/19/14   0850  06/27/13   1023  06/08/11   0901   CHOL  157  159  221*  197   TRIG  70  113  154*  132   HDL  57  36*  49*  45*   LDL  86  101  141*  125   VLDL   --   23  31*  26   CHOLHDLRATIO   --   4.0  4.0  4.0   NHDL  100   --    --    --      Immunization History     Immunization History   Administered Date(s) Administered     DPT 1980, 1980, 1980, 11/02/1982     Hepatitis B 07/22/1999     Influenza (IIV3) 11/05/1996     MMR 09/07/1992     OPV 1980, 1980, 1981, 11/02/1982     Pneumococcal (PCV 13) 09/20/2016     Pneumococcal 23 valent 12/20/2016     TD (ADULT, 7+) 07/14/1995, 01/17/2006     Tdap (Adacel,Boostrix) 12/10/2013          Chart documentation done in part with Dragon Voice recognition Software. Although reviewed after completion, some word and grammatical error may remain.    Andrea Benítez MD

## 2017-04-20 ENCOUNTER — MYC REFILL (OUTPATIENT)
Dept: FAMILY MEDICINE | Facility: CLINIC | Age: 37
End: 2017-04-20

## 2017-04-20 DIAGNOSIS — F33.1 MODERATE RECURRENT MAJOR DEPRESSION (H): ICD-10-CM

## 2017-04-20 RX ORDER — BUPROPION HYDROCHLORIDE 300 MG/1
300 TABLET ORAL EVERY MORNING
Qty: 90 TABLET | Refills: 1 | Status: SHIPPED | OUTPATIENT
Start: 2017-04-20 | End: 2017-08-30

## 2017-04-20 NOTE — TELEPHONE ENCOUNTER
Message from [employer]:  Original authorizing provider: MD Vida Araujo would like a refill of the following medications:  buPROPion (WELLBUTRIN XL) 300 MG 24 hr tablet [Fco Medeiros MD]    Preferred pharmacy: [facility] [place] [address]    Comment:

## 2017-05-03 ENCOUNTER — HOSPITAL ENCOUNTER (EMERGENCY)
Facility: CLINIC | Age: 37
Discharge: HOME | End: 2017-05-03
Attending: NURSE PRACTITIONER | Admitting: NURSE PRACTITIONER
Payer: COMMERCIAL

## 2017-05-03 VITALS
BODY MASS INDEX: 31.5 KG/M2 | DIASTOLIC BLOOD PRESSURE: 82 MMHG | SYSTOLIC BLOOD PRESSURE: 133 MMHG | HEART RATE: 91 BPM | TEMPERATURE: 97.8 F | HEIGHT: 70 IN | WEIGHT: 220 LBS | RESPIRATION RATE: 16 BRPM | OXYGEN SATURATION: 98 %

## 2017-05-03 DIAGNOSIS — R30.0 DYSURIA: ICD-10-CM

## 2017-05-03 LAB
ALBUMIN UR-MCNC: NEGATIVE MG/DL
APPEARANCE UR: CLEAR
BILIRUB UR QL STRIP: NEGATIVE
COLOR UR AUTO: ABNORMAL
GLUCOSE UR STRIP-MCNC: NEGATIVE MG/DL
HCG UR QL: NEGATIVE
HGB UR QL STRIP: NEGATIVE
KETONES UR STRIP-MCNC: NEGATIVE MG/DL
LEUKOCYTE ESTERASE UR QL STRIP: NEGATIVE
NITRATE UR QL: NEGATIVE
PH UR STRIP: 6 PH (ref 5–7)
SP GR UR STRIP: 1 (ref 1–1.03)
URN SPEC COLLECT METH UR: ABNORMAL
UROBILINOGEN UR STRIP-MCNC: NORMAL MG/DL (ref 0–2)

## 2017-05-03 PROCEDURE — 81025 URINE PREGNANCY TEST: CPT | Performed by: NURSE PRACTITIONER

## 2017-05-03 PROCEDURE — 87086 URINE CULTURE/COLONY COUNT: CPT | Performed by: NURSE PRACTITIONER

## 2017-05-03 PROCEDURE — 99213 OFFICE O/P EST LOW 20 MIN: CPT

## 2017-05-03 PROCEDURE — 81003 URINALYSIS AUTO W/O SCOPE: CPT | Performed by: NURSE PRACTITIONER

## 2017-05-03 PROCEDURE — 99213 OFFICE O/P EST LOW 20 MIN: CPT | Performed by: NURSE PRACTITIONER

## 2017-05-03 ASSESSMENT — ENCOUNTER SYMPTOMS
RESPIRATORY NEGATIVE: 1
DYSURIA: 1
NEUROLOGICAL NEGATIVE: 1
FEVER: 0
FLANK PAIN: 0
FREQUENCY: 1
GASTROINTESTINAL NEGATIVE: 1
FATIGUE: 0

## 2017-05-03 NOTE — ED AVS SNAPSHOT
[facility] Emergency Department    [address] [facility] [address]    Phone:  [phone]    Fax:  [phone]                                       Vida Whitfield   MRN: [id number]    Department:  [facility] Emergency Department   Date of Visit:  5/3/2017           After Visit Summary Signature Page     I have received my discharge instructions, and my questions have been answered. I have discussed any challenges I see with this plan with the nurse or doctor.    ..........................................................................................................................................  Patient/Patient Representative Signature      ..........................................................................................................................................  Patient Representative Print Name and Relationship to Patient    ..................................................               ................................................  Date                                            Time    ..........................................................................................................................................  Reviewed by Signature/Title    ...................................................              ..............................................  Date                                                            Time

## 2017-05-03 NOTE — ED AVS SNAPSHOT
[facility] Emergency Department    [address] [address]    Phone:  [phone]    Fax:  [phone]                                       Vida Whitfield   MRN: [id number]    Department:  [facility] Emergency Department   Date of Visit:  5/3/2017           Patient Information     Date Of Birth          1980        Your diagnoses for this visit were:     Dysuria        You were seen by Niraj Larkin APRN CNP.      Follow-up Information     Follow up with Fco Medeiros MD.    Specialty:  Family Practice    Why:  As needed, If symptoms worsen    Contact information:    [facility]  [address] [facility] [facility] [address]  [phone]          Discharge Instructions         Dysuria with Uncertain Cause (Adult)    The urethra is the tube that allows urine to pass out of the body. In a woman, the urethra is the opening above the vagina. In men, the urethra is the opening on the tip of the penis. Dysuria is the feeling of pain or burning in the urethra when passing urine.  Dysuria can be caused by anything that irritates or inflames the urethra. This can be caused by an infection or chemical irritation. The most common cause of dysuria in adults is a bladder infection. This is diagnosed with a urine test. It requires treatment with an antibiotic.  Soaps, lotions, colognes, feminine hygiene products, and birth control jellies, creams, and foams can cause chemical irritation and dysuria. It will go away in 1 to 3 days after the last time you use these irritants.  Sexually transmitted disease (STD) from chlamydia or gonorrhea can cause dysuria. If your doctor suspects this, he or she may take a culture specimen. It will take about 3 days to get the results. Antibiotic treatment may be started before the culture test returns.  In women who have gone through menopause, dysuria can be a result of dryness in the lining of the urethra. This can be treated with  hormones. Dysuria becomes long-term (chronic) when it lasts for weeks or months. You may need to see a specialist (urologist) to diagnose and treat chronic dysuria.  Home care  The following home care measures may help:    Avoid any chemicals or products that you suspect may be causing your symptoms.    If you were given a prescription medicine, take as directed and take the entire amount.    If a culture was taken, do not have sex until you have been told that it is negative (no infection). Then follow your healthcare provider's advice to treat your condition.  If a culture was done and it is positive:    Both you and your sexual partner need to be treated, even if your partner has no symptoms.    Contact your healthcare provider or go to an urgent care clinic or the public health department to be examined and treated.    Do not have sex until both you and your partner have completed all antibiotic medicine and are told that you are no longer contagious.    Learn about  safe sex  practices and use these in the future. The safest sex is with a partner who has tested negative and only has sex with you. Condoms offer protection from spreading some STDs, including gonorrhea, chlamydia and HIV, but are not a guarantee.  Follow-up care  Follow up with your healthcare provider as advised by our staff. If a culture was taken, call as directed the result. If diagnosed with an STD, follow up with your provider or the public health department for complete STD screening, including HIV testing. For more information, contact the National STD Hotline at 393-993-0967.  When to seek medical advice  Call your healthcare provider right away if any of these occur:    No improvement after three days of treatment    Fever of 100.4 F (38 C) or higher, or as directed    Increasing back or abdominal pain    Inability to urinate because of pain    A new discharge from the urethra, vagina or penis    Painful sores on the penis    Rash or  joint pain    Enlarged painful lymph nodes (lumps) in the groin    Testicle pain or swelling of the scrotum    7079-8537 [employer]. [address] [place] [address], [place] [address]. All rights reserved. This information is not intended as a substitute for professional medical care. Always follow your healthcare professional's instructions.          Future Appointments        Provider Department Dept Phone Center    6/28/2017 1:40 PM Andrea Benítez MD [facility] [phone] [facility]      24 Hour Appointment Hotline       To make an appointment at any [facility], call [phone] ([phone]). If you don't have a family doctor or clinic, we will help you find one. [facility] (formerly [facility] are conveniently located to serve the needs of you and your family.             Review of your medicines      Our records show that you are taking the medicines listed below. If these are incorrect, please call your family doctor or clinic.        Dose / Directions Last dose taken    ACIDOPHILUS PROBIOTIC FORMULA Tabs   Dose:  1 tablet        Take 1 tablet by mouth daily   Refills:  0        buPROPion 300 MG 24 hr tablet   Commonly known as:  WELLBUTRIN XL   Dose:  300 mg   Quantity:  90 tablet        Take 1 tablet (300 mg) by mouth every morning   Refills:  1        busPIRone 15 MG tablet   Commonly known as:  BUSPAR   Dose:  15 mg   Quantity:  180 tablet        Take 1 tablet (15 mg) by mouth 2 times daily   Refills:  1        clindamycin 1 % solution   Commonly known as:  CLEOCIN T   Quantity:  60 mL        Apply topically 2 times daily   Refills:  11        fish oil-omega-3 fatty acids 1000 MG capsule   Dose:  2 g   Quantity:  90 capsule        Take 2 capsules (2 g) by mouth daily   Refills:  0        folic acid 1 MG tablet   Commonly known as:  FOLVITE   Dose:  1 mg   Quantity:  100 tablet        Take 1 tablet (1 mg) by mouth daily   Refills:  3        levonorgestrel-ethinyl estradiol 0.15-0.03 MG  per tablet   Commonly known as:  SEASONALE   Dose:  1 tablet   Quantity:  90 tablet        Take 1 tablet by mouth daily   Refills:  3        levothyroxine 175 MCG tablet   Commonly known as:  SYNTHROID/LEVOTHROID   Dose:  175 mcg   Quantity:  30 tablet        Take 1 tablet (175 mcg) by mouth daily   Refills:  11        methotrexate sodium 2.5 MG Tabs   Dose:  15 mg   Quantity:  72 tablet        Take 15 mg by mouth once a week . Take all 6 tablets on the same day of each week.   Refills:  1        multivitamin per tablet   Dose:  1 tablet   Quantity:  100 tablet        Take 1 tablet by mouth daily.   Refills:  12        order for DME        Equipment being ordered: CPAP 6-10 cm   Refills:  0        sulfaSALAzine  MG EC tablet   Commonly known as:  AZULFIDINE EN   Dose:  1000 mg   Quantity:  360 tablet        Take 2 tablets (1,000 mg) by mouth 2 times daily   Refills:  1        traZODone 50 MG tablet   Commonly known as:  DESYREL   Quantity:  180 tablet        Take 1-2 tablets by mouth at bedtime.   Refills:  1        ZYRTEC ALLERGY 10 MG tablet   Dose:  20 mg   Generic drug:  cetirizine        Take 20 mg by mouth daily.   Refills:  0                Procedures and tests performed during your visit     HCG qualitative urine    UA reflex to Microscopic      Orders Needing Specimen Collection     None      Pending Results     No orders found from 5/1/2017 to 5/4/2017.            Pending Culture Results     No orders found from 5/1/2017 to 5/4/2017.            Pending Results Instructions     If you had any lab results that were not finalized at the time of your Discharge, you can call the ED Lab Result RN at [phone]. You will be contacted by this team for any positive Lab results or changes in treatment. The nurses are available 7 days a week from 10A to 6:30P.  You can leave a message 24 hours per day and they will return your call.        Test Results From Your Hospital Stay        5/3/2017  8:15 PM       Component Results     Component Value Ref Range & Units Status    Color Urine Light Yellow  Final    Appearance Urine Clear  Final    Glucose Urine Negative NEG mg/dL Final    Bilirubin Urine Negative NEG Final    Ketones Urine Negative NEG mg/dL Final    Specific Gravity Urine 1.000 (L) 1.003 - 1.035 Final    Blood Urine Negative NEG Final    pH Urine 6.0 5.0 - 7.0 pH Final    Protein Albumin Urine Negative NEG mg/dL Final    Urobilinogen mg/dL Normal 0.0 - 2.0 mg/dL Final    Nitrite Urine Negative NEG Final    Leukocyte Esterase Urine Negative NEG Final    Source Midstream Urine  Final         5/3/2017  8:14 PM      Component Results     Component Value Ref Range & Units Status    HCG Qual Urine Negative NEG Final                Thank you for choosing [facility]       Thank you for choosing [facility] for your care. Our goal is always to provide you with excellent care. Hearing back from our patients is one way we can continue to improve our services. Please take a few minutes to complete the written survey that you may receive in the mail after you visit with us. Thank you!        [employer] Information     [employer] gives you secure access to your electronic health record. If you see a primary care provider, you can also send messages to your care team and make appointments. If you have questions, please call your primary care clinic.  If you do not have a primary care provider, please call [phone] and they will assist you.        Care EveryWhere ID     This is your Care EveryWhere ID. This could be used by other organizations to access your [facility] medical records  [id number]        After Visit Summary       This is your record. Keep this with you and show to your community pharmacist(s) and doctor(s) at your next visit.

## 2017-05-04 NOTE — DISCHARGE INSTRUCTIONS
Dysuria with Uncertain Cause (Adult)    The urethra is the tube that allows urine to pass out of the body. In a woman, the urethra is the opening above the vagina. In men, the urethra is the opening on the tip of the penis. Dysuria is the feeling of pain or burning in the urethra when passing urine.  Dysuria can be caused by anything that irritates or inflames the urethra. This can be caused by an infection or chemical irritation. The most common cause of dysuria in adults is a bladder infection. This is diagnosed with a urine test. It requires treatment with an antibiotic.  Soaps, lotions, colognes, feminine hygiene products, and birth control jellies, creams, and foams can cause chemical irritation and dysuria. It will go away in 1 to 3 days after the last time you use these irritants.  Sexually transmitted disease (STD) from chlamydia or gonorrhea can cause dysuria. If your doctor suspects this, he or she may take a culture specimen. It will take about 3 days to get the results. Antibiotic treatment may be started before the culture test returns.  In women who have gone through menopause, dysuria can be a result of dryness in the lining of the urethra. This can be treated with hormones. Dysuria becomes long-term (chronic) when it lasts for weeks or months. You may need to see a specialist (urologist) to diagnose and treat chronic dysuria.  Home care  The following home care measures may help:    Avoid any chemicals or products that you suspect may be causing your symptoms.    If you were given a prescription medicine, take as directed and take the entire amount.    If a culture was taken, do not have sex until you have been told that it is negative (no infection). Then follow your healthcare provider's advice to treat your condition.  If a culture was done and it is positive:    Both you and your sexual partner need to be treated, even if your partner has no symptoms.    Contact your healthcare provider or go  to an urgent care clinic or the public health department to be examined and treated.    Do not have sex until both you and your partner have completed all antibiotic medicine and are told that you are no longer contagious.    Learn about  safe sex  practices and use these in the future. The safest sex is with a partner who has tested negative and only has sex with you. Condoms offer protection from spreading some STDs, including gonorrhea, chlamydia and HIV, but are not a guarantee.  Follow-up care  Follow up with your healthcare provider as advised by our staff. If a culture was taken, call as directed the result. If diagnosed with an STD, follow up with your provider or the public health department for complete STD screening, including HIV testing. For more information, contact the National STD Hotline at 129-885-7748.  When to seek medical advice  Call your healthcare provider right away if any of these occur:    No improvement after three days of treatment    Fever of 100.4 F (38 C) or higher, or as directed    Increasing back or abdominal pain    Inability to urinate because of pain    A new discharge from the urethra, vagina or penis    Painful sores on the penis    Rash or joint pain    Enlarged painful lymph nodes (lumps) in the groin    Testicle pain or swelling of the scrotum    6316-3765 [employer]. [address] [place] [address], [place] [address]. All rights reserved. This information is not intended as a substitute for professional medical care. Always follow your healthcare professional's instructions.

## 2017-05-04 NOTE — ED PROVIDER NOTES
"  History     Chief Complaint   Patient presents with     Urinary Frequency     c/o urinary frequency, burning and low back pain.     HPI  Vida Whitfield is a 37 year old female who presents with burning, frequency since yesterday. She denies N/v/D and hematuria but is having low back pain that seems to be aggravated by a full bladder. She denies flank pain however. No fever either.     I have reviewed the Medications, Allergies, Past Medical and Surgical History, and Social History in the Epic system.    Review of Systems   Constitutional: Negative for fatigue and fever.   Respiratory: Negative.    Gastrointestinal: Negative.    Genitourinary: Positive for dysuria, frequency and urgency. Negative for flank pain and pelvic pain.   Neurological: Negative.    All other systems reviewed and are negative.      Physical Exam   BP: 133/82  Pulse: 91  Temp: 97.8  F (36.6  C)  Resp: 16  Height: 177.8 cm (5' 10\")  Weight: 99.8 kg (220 lb)  SpO2: 98 %  Physical Exam   Constitutional: She is oriented to person, place, and time. She appears well-developed and well-nourished.   HENT:   Mouth/Throat: No oropharyngeal exudate.   Eyes: Conjunctivae and EOM are normal. Right eye exhibits no discharge. Left eye exhibits no discharge.   Neck: Neck supple.   Cardiovascular: Normal rate and normal heart sounds.    Pulmonary/Chest: Effort normal.   Musculoskeletal:   Low back pain but no radicular pain, no CVA tenderness   Neurological: She is alert and oriented to person, place, and time.   Skin: Skin is warm. No rash noted.       ED Course     ED Course     Procedures                 Labs Ordered and Resulted from Time of ED Arrival Up to the Time of Departure from the ED   URINE MACROSCOPIC WITH REFLEX TO MICRO - Abnormal; Notable for the following:        Result Value    Specific Gravity Urine 1.000 (*)     All other components within normal limits   HCG QUALITATIVE URINE   urine negative    Assessments & Plan (with Medical Decision " Making)   Dysuria of unknown cause    I have reviewed the nursing notes.    I have reviewed the findings, diagnosis, plan and need for follow up with the patient.  Recommend sitz baths, motrin, f/u prn    New Prescriptions    No medications on file       Final diagnoses:   Dysuria       5/3/2017   [facility] EMERGENCY DEPARTMENT     Niraj Larkin, AMA CNP  05/03/17 2020

## 2017-05-05 ENCOUNTER — OFFICE VISIT (OUTPATIENT)
Dept: FAMILY MEDICINE | Facility: CLINIC | Age: 37
End: 2017-05-05
Payer: COMMERCIAL

## 2017-05-05 VITALS
TEMPERATURE: 97.8 F | WEIGHT: 200 LBS | HEIGHT: 70 IN | BODY MASS INDEX: 28.63 KG/M2 | SYSTOLIC BLOOD PRESSURE: 103 MMHG | DIASTOLIC BLOOD PRESSURE: 73 MMHG | HEART RATE: 97 BPM

## 2017-05-05 DIAGNOSIS — R10.9 FLANK PAIN: Primary | ICD-10-CM

## 2017-05-05 DIAGNOSIS — F41.1 GENERALIZED ANXIETY DISORDER: Chronic | ICD-10-CM

## 2017-05-05 LAB
BACTERIA SPEC CULT: NORMAL
MICRO REPORT STATUS: NORMAL
SPECIMEN SOURCE: NORMAL

## 2017-05-05 PROCEDURE — 99214 OFFICE O/P EST MOD 30 MIN: CPT | Performed by: FAMILY MEDICINE

## 2017-05-05 RX ORDER — ALPRAZOLAM 0.5 MG
0.5 TABLET ORAL 3 TIMES DAILY PRN
Qty: 20 TABLET | Refills: 0 | Status: SHIPPED | OUTPATIENT
Start: 2017-05-05 | End: 2018-10-05

## 2017-05-05 ASSESSMENT — ANXIETY QUESTIONNAIRES
1. FEELING NERVOUS, ANXIOUS, OR ON EDGE: NEARLY EVERY DAY
GAD7 TOTAL SCORE: 12
IF YOU CHECKED OFF ANY PROBLEMS ON THIS QUESTIONNAIRE, HOW DIFFICULT HAVE THESE PROBLEMS MADE IT FOR YOU TO DO YOUR WORK, TAKE CARE OF THINGS AT HOME, OR GET ALONG WITH OTHER PEOPLE: SOMEWHAT DIFFICULT
2. NOT BEING ABLE TO STOP OR CONTROL WORRYING: MORE THAN HALF THE DAYS
5. BEING SO RESTLESS THAT IT IS HARD TO SIT STILL: NOT AT ALL
7. FEELING AFRAID AS IF SOMETHING AWFUL MIGHT HAPPEN: SEVERAL DAYS
6. BECOMING EASILY ANNOYED OR IRRITABLE: MORE THAN HALF THE DAYS
3. WORRYING TOO MUCH ABOUT DIFFERENT THINGS: MORE THAN HALF THE DAYS

## 2017-05-05 ASSESSMENT — PATIENT HEALTH QUESTIONNAIRE - PHQ9: 5. POOR APPETITE OR OVEREATING: MORE THAN HALF THE DAYS

## 2017-05-05 NOTE — PROGRESS NOTES
SUBJECTIVE:                                                    Vida Whitfield is a 37 year old female who presents to clinic today for the following health issues:      Depression and Anxiety Follow-Up    Status since last visit: Worsened    Other associated symptoms:None    Complicating factors:     Significant life event: Yes-  Father passed away a little over a month ago.  broke his ankle and is out of work for a while.     Current substance abuse: None    She has been on Xanax in the past, many years ago.     PHQ-9 SCORE 9/2/2016 3/21/2017 5/5/2017   Total Score - - -   Total Score MyChart - 6 (Mild depression) -   Total Score 2 - 13     JANET-7 SCORE 9/2/2016 3/21/2017 5/5/2017   Total Score - - -   Total Score - 3 (minimal anxiety) -   Total Score 1 - 12        PHQ-9  English      PHQ-9   Any Language     GAD7       Amount of exercise or physical activity: 2-3 days/week for an average of 30-45 minutes    Problems taking medications regularly: No    Medication side effects: none    Diet: regular (no restrictions)      URINARY TRACT SYMPTOMS     Onset: 1 week    Description:   Painful urination (Dysuria): no   Blood in urine (Hematuria): no   Delay in urine (Hesitency): YES    Intensity: mild    Progression of Symptoms:  same    Accompanying Signs & Symptoms:  Fever/chills: no   Flank pain YES  Nausea and vomiting: no   Any vaginal symptoms: none  Abdominal/Pelvic Pain: YES- more so fullness   History:   History of frequent UTI's: no   History of kidney stones: no   Sexually Active: YES  Possibility of pregnancy: No  She had a normal bladder US in 2012.     Precipitating factors:   n/a         Therapies Tried and outcome: increase water intake      Current Outpatient Prescriptions:      buPROPion (WELLBUTRIN XL) 300 MG 24 hr tablet, Take 1 tablet (300 mg) by mouth every morning, Disp: 90 tablet, Rfl: 1     methotrexate sodium 2.5 MG TABS, Take 15 mg by mouth once a week . Take all 6 tablets on the same  day of each week., Disp: 72 tablet, Rfl: 1     sulfaSALAzine ER (AZULFIDINE EN) 500 MG EC tablet, Take 2 tablets (1,000 mg) by mouth 2 times daily, Disp: 360 tablet, Rfl: 1     busPIRone (BUSPAR) 15 MG tablet, Take 1 tablet (15 mg) by mouth 2 times daily, Disp: 180 tablet, Rfl: 1     folic acid (FOLVITE) 1 MG tablet, Take 1 tablet (1 mg) by mouth daily, Disp: 100 tablet, Rfl: 3     levonorgestrel-ethinyl estradiol (SEASONALE) 0.15-0.03 MG per tablet, Take 1 tablet by mouth daily, Disp: 90 tablet, Rfl: 3     levothyroxine (SYNTHROID, LEVOTHROID) 175 MCG tablet, Take 1 tablet (175 mcg) by mouth daily, Disp: 30 tablet, Rfl: 11     traZODone (DESYREL) 50 MG tablet, Take 1-2 tablets by mouth at bedtime., Disp: 180 tablet, Rfl: 1     clindamycin (CLEOCIN T) 1 % external solution, Apply topically 2 times daily, Disp: 60 mL, Rfl: 11     fish oil-omega-3 fatty acids (OMEGA 3) 1000 MG capsule, Take 2 capsules (2 g) by mouth daily, Disp: 90 capsule, Rfl:      Lactobacillus (ACIDOPHILUS PROBIOTIC FORMULA) TABS, Take 1 tablet by mouth daily, Disp: , Rfl:      cetirizine (ZYRTEC ALLERGY) 10 MG tablet, Take 20 mg by mouth daily., Disp: , Rfl:      Multiple Vitamin (MULTIVITAMIN) per tablet, Take 1 tablet by mouth daily., Disp: 100 tablet, Rfl: 12     order for DME, Equipment being ordered: CPAP 6-10 cm, Disp: , Rfl:     Patient Active Problem List   Diagnosis     Contraceptive management     Hyperhidrosis of axilla     CARDIOVASCULAR SCREENING; LDL GOAL LESS THAN 160     Generalized anxiety disorder     Moderate recurrent major depression (H)     Chronic idiopathic urticaria     Hyperlipidemia with target LDL less than 130     Hypothyroidism, unspecified hypothyroidism type     Rosacea     Non morbid obesity due to excess calories     BRIDGETTE (obstructive sleep apnea)     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    Exam:  GENERAL APPEARANCE: healthy, alert and no distress  EYES: EOMI,  PERRL  NECK: no adenopathy, no asymmetry, masses, or scars and thyroid normal to palpation  RESP: lungs clear to auscultation - no rales, rhonchi or wheezes  CV: regular rates and rhythm, normal S1 S2, no S3 or S4 and no murmur, click or rub -  ABDOMEN:  soft, nontender, no HSM or masses and bowel sounds normal  ABDOMEN: tender on the right flank and the right CVA area. The left side is not tender and the rest of the abdomen is normal.   The bladder is not tender.   SKIN: no suspicious lesions or rashes  PSYCH: mentation appears normal and affect normal/bright    The UC was contaminated.     (R10.9) Flank pain  (primary encounter diagnosis)  Comment:   Plan: US Renal Complete        We reviewed and discussed the recent urine test and culture. There is no UTI, in my opinion.   Monitor for pain or fever or increased tenderness. Stay well hydrated. The Ultrasound of the kidney and bladder areas was ordered.   We will call the results.  If this is not diagnostic, then a CT scan of the pelvis and abdomen with contrast, is the next test and call our   RN at [phone] to schedule this.      (F41.1) Generalized anxiety disorder  Comment:   Plan: ALPRAZolam (XANAX) 0.5 MG tablet        We reviewed the medications and she will stay on the same doses of the Wellbutrin, Buspar and Trazodone. Xanax at 0;5 mg as needed, every 8 hours is ordered, and the written  Rx for #20 with no refill was done today. Take no alcohol or other meds at the same time as the Xanax that cause drowsiness.       Fco Medeiros"

## 2017-05-05 NOTE — NURSING NOTE
"Chief Complaint   Patient presents with     Depression     depression and anxiety are worsening     Urinary Problem     urinary frequency x 1 week, lower back pain       Initial /73 (BP Location: Left arm, Patient Position: Chair, Cuff Size: Adult Regular)  Pulse 97  Temp 97.8  F (36.6  C) (Tympanic)  Ht 5' 10\" (1.778 m)  Wt 200 lb (90.7 kg)  BMI 28.7 kg/m2 Estimated body mass index is 28.7 kg/(m^2) as calculated from the following:    Height as of this encounter: 5' 10\" (1.778 m).    Weight as of this encounter: 200 lb (90.7 kg).  Medication Reconciliation: complete  "

## 2017-05-05 NOTE — PATIENT INSTRUCTIONS
Thank you for choosing [facility].  You may be receiving a survey in the mail from [employer] regarding your visit today.  Please take a few minutes to complete and return the survey to let us know how we are doing.      If you have questions or concerns, please contact us via [employer] or you can contact your care team at [phone].    Our Clinic hours are:  Monday 6:40 am  to 7:00 pm  Tuesday -Friday 6:40 am to 5:00 pm    The [place] outpatient lab hours are:  Monday - Friday 6:10 am to 4:45 pm  Saturdays 7:00 am to 11:00 am  Appointments are required, call [phone]    If you have clinical questions after hours or would like to schedule an appointment,  call the clinic at [phone].    (R10.9) Flank pain  (primary encounter diagnosis)  Comment:   Plan: US Renal Complete        We reviewed and discussed the recent urine test and culture. There is no UTI, in my opinion.   Monitor for pain or fever or increased tenderness. Stay well hydrated. The Ultrasound of the kidney and bladder areas was ordered.   We will call the results.  If this is not diagnostic, then a CT scan of the pelvis and abdomen with contrast, is the next test and call our   RN at [phone] to schedule this.      (F41.1) Generalized anxiety disorder  Comment:   Plan: ALPRAZolam (XANAX) 0.5 MG tablet        We reviewed the medications and she will stay on the same doses of the Wellbutrin, Buspar and Trazodone. Xanax at 0;5 mg as needed, every 8 hours is ordered, and the written  Rx for #20 with no refill was done today. Take no alcohol or other meds at the same time as the Xanax that cause drowsiness.

## 2017-05-05 NOTE — MR AVS SNAPSHOT
After Visit Summary   5/5/2017    Vida Whitfield    MRN: [id number]           Patient Information     Date Of Birth          1980        Visit Information        Provider Department      5/5/2017 3:40 PM Fco Medeiros MD [facility]        Today's Diagnoses     Flank pain    -  1    Generalized anxiety disorder          Care Instructions          Thank you for choosing [facility].  You may be receiving a survey in the mail from [facility] regarding your visit today.  Please take a few minutes to complete and return the survey to let us know how we are doing.      If you have questions or concerns, please contact us via [employer] or you can contact your care team at [phone].    Our Clinic hours are:  Monday 6:40 am  to 7:00 pm  Tuesday -Friday 6:40 am to 5:00 pm    The [place] outpatient lab hours are:  Monday - Friday 6:10 am to 4:45 pm  Saturdays 7:00 am to 11:00 am  Appointments are required, call [phone]    If you have clinical questions after hours or would like to schedule an appointment,  call the clinic at [phone].    (R10.9) Flank pain  (primary encounter diagnosis)  Comment:   Plan: US Renal Complete        We reviewed and discussed the recent urine test and culture. There is no UTI, in my opinion.   Monitor for pain or fever or increased tenderness. Stay well hydrated. The Ultrasound of the kidney and bladder areas was ordered.   We will call the results.  If this is not diagnostic, then a CT scan of the pelvis and abdomen with contrast, is the next test and call our   RN at [phone] to schedule this.      (F41.1) Generalized anxiety disorder  Comment:   Plan: ALPRAZolam (XANAX) 0.5 MG tablet        We reviewed the medications and she will stay on the same doses of the Wellbutrin, Buspar and Trazodone. Xanax at 0;5 mg as needed, every 8 hours is ordered, and the written  Rx for #20 with no refill was done today. Take no alcohol or other meds at  "the same time as the Xanax that cause drowsiness.           Follow-ups after your visit        Your next 10 appointments already scheduled     Jun 28, 2017  1:40 PM CDT   Return Visit with Andrea Benítez MD   [facility] [place] ([facility])    [address]  [place] [address]   [phone]              Future tests that were ordered for you today     Open Future Orders        Priority Expected Expires Ordered    US Renal Complete Routine 8/3/2017 5/5/2018 5/5/2017            Who to contact     If you have questions or need follow up information about today's clinic visit or your schedule please contact [facility] directly at [phone].  Normal or non-critical lab and imaging results will be communicated to you by [employer]Forwardhart, letter or phone within 4 business days after the clinic has received the results. If you do not hear from us within 7 days, please contact the clinic through [employer] or phone. If you have a critical or abnormal lab result, we will notify you by phone as soon as possible.  Submit refill requests through [employer] or call your pharmacy and they will forward the refill request to us. Please allow 3 business days for your refill to be completed.          Additional Information About Your Visit        [employer] Information     [employer] gives you secure access to your electronic health record. If you see a primary care provider, you can also send messages to your care team and make appointments. If you have questions, please call your primary care clinic.  If you do not have a primary care provider, please call [phone] and they will assist you.        Care EveryWhere ID     This is your Care EveryWhere ID. This could be used by other organizations to access your [facility] medical records  [id number]        Your Vitals Were     Pulse Temperature Height BMI (Body Mass Index)          97 97.8  F (36.6  C) (Tympanic) 5' 10\" (1.778 m) 28.7 kg/m2         Blood " Pressure from Last 3 Encounters:   05/05/17 103/73   05/03/17 133/82   03/29/17 127/75    Weight from Last 3 Encounters:   05/05/17 200 lb (90.7 kg)   05/03/17 220 lb (99.8 kg)   03/29/17 223 lb 6.4 oz (101.3 kg)                 Today's Medication Changes          These changes are accurate as of: 5/5/17  4:17 PM.  If you have any questions, ask your nurse or doctor.               Start taking these medicines.        Dose/Directions    ALPRAZolam 0.5 MG tablet   Commonly known as:  XANAX   Used for:  Generalized anxiety disorder   Started by:  Fco Medeiros MD        Dose:  0.5 mg   Take 1 tablet (0.5 mg) by mouth 3 times daily as needed for anxiety   Quantity:  20 tablet   Refills:  0            Where to get your medicines      Some of these will need a paper prescription and others can be bought over the counter.  Ask your nurse if you have questions.     Bring a paper prescription for each of these medications     ALPRAZolam 0.5 MG tablet                Primary Care Provider Office Phone # Fax #    Fco Medeiros [phone]       [facility] [address] [address]        Thank you!     Thank you for choosing [facility]  for your care. Our goal is always to provide you with excellent care. Hearing back from our patients is one way we can continue to improve our services. Please take a few minutes to complete the written survey that you may receive in the mail after your visit with us. Thank you!             Your Updated Medication List - Protect others around you: Learn how to safely use, store and throw away your medicines at www.disposemymeds.org.          This list is accurate as of: 5/5/17  4:17 PM.  Always use your most recent med list.                   Brand Name Dispense Instructions for use    ACIDOPHILUS PROBIOTIC FORMULA Tabs      Take 1 tablet by mouth daily       ALPRAZolam 0.5 MG tablet    XANAX    20 tablet    Take 1 tablet  (0.5 mg) by mouth 3 times daily as needed for anxiety       buPROPion 300 MG 24 hr tablet    WELLBUTRIN XL    90 tablet    Take 1 tablet (300 mg) by mouth every morning       busPIRone 15 MG tablet    BUSPAR    180 tablet    Take 1 tablet (15 mg) by mouth 2 times daily       clindamycin 1 % solution    CLEOCIN T    60 mL    Apply topically 2 times daily       fish oil-omega-3 fatty acids 1000 MG capsule     90 capsule    Take 2 capsules (2 g) by mouth daily       folic acid 1 MG tablet    FOLVITE    100 tablet    Take 1 tablet (1 mg) by mouth daily       levonorgestrel-ethinyl estradiol 0.15-0.03 MG per tablet    SEASONALE    90 tablet    Take 1 tablet by mouth daily       levothyroxine 175 MCG tablet    SYNTHROID/LEVOTHROID    30 tablet    Take 1 tablet (175 mcg) by mouth daily       methotrexate sodium 2.5 MG Tabs     72 tablet    Take 15 mg by mouth once a week . Take all 6 tablets on the same day of each week.       multivitamin per tablet     100 tablet    Take 1 tablet by mouth daily.       order for DME      Equipment being ordered: CPAP 6-10 cm       sulfaSALAzine  MG EC tablet    AZULFIDINE EN    360 tablet    Take 2 tablets (1,000 mg) by mouth 2 times daily       traZODone 50 MG tablet    DESYREL    180 tablet    Take 1-2 tablets by mouth at bedtime.       ZYRTEC ALLERGY 10 MG tablet   Generic drug:  cetirizine      Take 20 mg by mouth daily.

## 2017-05-06 ENCOUNTER — HOSPITAL ENCOUNTER (OUTPATIENT)
Dept: ULTRASOUND IMAGING | Facility: CLINIC | Age: 37
Discharge: HOME | End: 2017-05-06
Attending: FAMILY MEDICINE | Admitting: FAMILY MEDICINE
Payer: COMMERCIAL

## 2017-05-06 DIAGNOSIS — R10.9 FLANK PAIN: ICD-10-CM

## 2017-05-06 PROCEDURE — 76770 US EXAM ABDO BACK WALL COMP: CPT

## 2017-05-06 ASSESSMENT — ANXIETY QUESTIONNAIRES: GAD7 TOTAL SCORE: 12

## 2017-05-06 ASSESSMENT — PATIENT HEALTH QUESTIONNAIRE - PHQ9: SUM OF ALL RESPONSES TO PHQ QUESTIONS 1-9: 13

## 2017-05-12 ENCOUNTER — HOSPITAL ENCOUNTER (OUTPATIENT)
Dept: CT IMAGING | Facility: CLINIC | Age: 37
Discharge: HOME | End: 2017-05-12
Attending: FAMILY MEDICINE | Admitting: FAMILY MEDICINE
Payer: COMMERCIAL

## 2017-05-12 DIAGNOSIS — R10.9 FLANK PAIN: ICD-10-CM

## 2017-05-12 PROCEDURE — 74176 CT ABD & PELVIS W/O CONTRAST: CPT

## 2017-05-25 ENCOUNTER — MYC REFILL (OUTPATIENT)
Dept: FAMILY MEDICINE | Facility: CLINIC | Age: 37
End: 2017-05-25

## 2017-05-25 DIAGNOSIS — E03.9 HYPOTHYROIDISM, UNSPECIFIED TYPE: ICD-10-CM

## 2017-05-25 RX ORDER — LEVOTHYROXINE SODIUM 175 UG/1
175 TABLET ORAL DAILY
Qty: 30 TABLET | Refills: 0 | Status: SHIPPED | OUTPATIENT
Start: 2017-05-25 | End: 2017-06-22

## 2017-05-25 NOTE — TELEPHONE ENCOUNTER
levothyroxine     Last Written Prescription Date: 5/27/16  Last Quantity: 30, # refills: 11  Last Office Visit with G, P or [facility] prescribing provider: 5/5/17   Next 5 appointments (look out 90 days)     Jun 28, 2017  1:40 PM CDT   Return Visit with Andrea Benítez MD   [facility]    [address] [address]   [phone]                   TSH   Date Value Ref Range Status   06/22/2016 0.07 (L) 0.40 - 4.00 mU/L Final     Medication is being filled for 1 time refill only due to:  Patient needs labs and visit in June. . Corinna Larson RNC

## 2017-05-25 NOTE — TELEPHONE ENCOUNTER
Message from [employer]:  Original authorizing provider: MD Vida Araujo would like a refill of the following medications:  levothyroxine (SYNTHROID, LEVOTHROID) 175 MCG tablet [Fco Medeiros MD]    Preferred pharmacy: [facility] [address]    Comment:

## 2017-06-19 ENCOUNTER — MYC REFILL (OUTPATIENT)
Dept: FAMILY MEDICINE | Facility: CLINIC | Age: 37
End: 2017-06-19

## 2017-06-19 DIAGNOSIS — F34.1 CHRONIC DEPRESSIVE PERSONALITY DISORDER: ICD-10-CM

## 2017-06-19 RX ORDER — TRAZODONE HYDROCHLORIDE 50 MG/1
TABLET, FILM COATED ORAL
Qty: 180 TABLET | Refills: 1 | Status: SHIPPED | OUTPATIENT
Start: 2017-06-19 | End: 2017-08-30

## 2017-06-19 NOTE — TELEPHONE ENCOUNTER
Message from [employer]:  Original authorizing provider: MD Vida Araujo would like a refill of the following medications:  traZODone (DESYREL) 50 MG tablet [Fco Medeiros MD]    Preferred pharmacy: [facility], [place] - [address]    Comment:

## 2017-06-19 NOTE — TELEPHONE ENCOUNTER
Routing refill request to provider for review/approval because:  PHQ-9 greater than 5 - out of RN protocol.      trazodone       Last Written Prescription Date: 2-25-16  Last Fill Quantity: 180; # refills: 1  Last Office Visit with [facility], [facility] or [facility] prescribing provider:  5-5-17   Next 5 appointments (look out 90 days)     Jun 21, 2017 12:05 PM CDT   Lab visit with [facility] ([facility])    [address] [address]   [phone]            Jun 28, 2017  1:40 PM CDT   Return Visit with Andrea Benítez MD   [facility]    [address]  [place] [address] [phone]                   Last PHQ-9 score on record=   PHQ-9 SCORE 5/5/2017   Total Score -   Total Score MyChart -   Total Score 13       Lab Results   Component Value Date    AST 23 03/22/2017     Lab Results   Component Value Date    ALT 41 03/22/2017

## 2017-06-21 DIAGNOSIS — M06.09 RHEUMATOID ARTHRITIS OF MULTIPLE SITES WITH NEGATIVE RHEUMATOID FACTOR (H): ICD-10-CM

## 2017-06-21 DIAGNOSIS — Z79.899 HIGH RISK MEDICATION USE: ICD-10-CM

## 2017-06-21 DIAGNOSIS — E03.9 HYPOTHYROIDISM, UNSPECIFIED TYPE: ICD-10-CM

## 2017-06-21 LAB
ALBUMIN SERPL-MCNC: 3.5 G/DL (ref 3.4–5)
ALP SERPL-CCNC: 45 U/L (ref 40–150)
ALT SERPL W P-5'-P-CCNC: 22 U/L (ref 0–50)
AST SERPL W P-5'-P-CCNC: 17 U/L (ref 0–45)
BASOPHILS # BLD AUTO: 0.1 10E9/L (ref 0–0.2)
BASOPHILS NFR BLD AUTO: 1.1 %
BILIRUB DIRECT SERPL-MCNC: 0.1 MG/DL (ref 0–0.2)
BILIRUB SERPL-MCNC: 0.3 MG/DL (ref 0.2–1.3)
CREAT SERPL-MCNC: 1 MG/DL (ref 0.52–1.04)
CRP SERPL-MCNC: 8.3 MG/L (ref 0–8)
DIFFERENTIAL METHOD BLD: NORMAL
EOSINOPHIL # BLD AUTO: 0.2 10E9/L (ref 0–0.7)
EOSINOPHIL NFR BLD AUTO: 3.5 %
ERYTHROCYTE [DISTWIDTH] IN BLOOD BY AUTOMATED COUNT: 12.7 % (ref 10–15)
ERYTHROCYTE [SEDIMENTATION RATE] IN BLOOD BY WESTERGREN METHOD: 5 MM/H (ref 0–20)
GFR SERPL CREATININE-BSD FRML MDRD: 63 ML/MIN/1.7M2
HCT VFR BLD AUTO: 40.7 % (ref 35–47)
HGB BLD-MCNC: 14 G/DL (ref 11.7–15.7)
LYMPHOCYTES # BLD AUTO: 1.9 10E9/L (ref 0.8–5.3)
LYMPHOCYTES NFR BLD AUTO: 34.2 %
MCH RBC QN AUTO: 32.3 PG (ref 26.5–33)
MCHC RBC AUTO-ENTMCNC: 34.4 G/DL (ref 31.5–36.5)
MCV RBC AUTO: 94 FL (ref 78–100)
MONOCYTES # BLD AUTO: 0.5 10E9/L (ref 0–1.3)
MONOCYTES NFR BLD AUTO: 9.8 %
NEUTROPHILS # BLD AUTO: 2.8 10E9/L (ref 1.6–8.3)
NEUTROPHILS NFR BLD AUTO: 51.4 %
PLATELET # BLD AUTO: 186 10E9/L (ref 150–450)
PROT SERPL-MCNC: 6.9 G/DL (ref 6.8–8.8)
RBC # BLD AUTO: 4.33 10E12/L (ref 3.8–5.2)
TSH SERPL DL<=0.005 MIU/L-ACNC: 1.18 MU/L (ref 0.4–4)
WBC # BLD AUTO: 5.5 10E9/L (ref 4–11)

## 2017-06-21 PROCEDURE — 85025 COMPLETE CBC W/AUTO DIFF WBC: CPT | Performed by: INTERNAL MEDICINE

## 2017-06-21 PROCEDURE — 84443 ASSAY THYROID STIM HORMONE: CPT | Performed by: FAMILY MEDICINE

## 2017-06-21 PROCEDURE — 36415 COLL VENOUS BLD VENIPUNCTURE: CPT | Performed by: FAMILY MEDICINE

## 2017-06-21 PROCEDURE — 82565 ASSAY OF CREATININE: CPT | Performed by: INTERNAL MEDICINE

## 2017-06-21 PROCEDURE — 80076 HEPATIC FUNCTION PANEL: CPT | Performed by: INTERNAL MEDICINE

## 2017-06-21 PROCEDURE — 85652 RBC SED RATE AUTOMATED: CPT | Performed by: INTERNAL MEDICINE

## 2017-06-21 PROCEDURE — 86140 C-REACTIVE PROTEIN: CPT | Performed by: INTERNAL MEDICINE

## 2017-06-22 DIAGNOSIS — E03.9 HYPOTHYROIDISM, UNSPECIFIED TYPE: ICD-10-CM

## 2017-06-22 NOTE — TELEPHONE ENCOUNTER
LEVOTHYROXINE SODIUM 175MCG TABS       Last Written Prescription Date: 5/27/2017  Last Quantity: 30, # refills: 0  Last Office Visit with [facility] or [facility] prescribing provider: 5/5/2017   Next 5 appointments (look out 90 days)     Jul 14, 2017  8:40 AM CDT   [facility] Visit with Andrea Ben[facility]tez MD   [facility]    [address]  [place] [address]   [phone]                   TSH   Date Value Ref Range Status   06/21/2017 1.18 0.40 - 4.00 mU/L Final

## 2017-06-23 RX ORDER — LEVOTHYROXINE SODIUM 175 UG/1
TABLET ORAL
Qty: 90 TABLET | Refills: 3 | Status: SHIPPED | OUTPATIENT
Start: 2017-06-23 | End: 2017-08-30

## 2017-07-14 ENCOUNTER — OFFICE VISIT (OUTPATIENT)
Dept: RHEUMATOLOGY | Facility: CLINIC | Age: 37
End: 2017-07-14
Payer: COMMERCIAL

## 2017-07-14 VITALS
WEIGHT: 227 LBS | TEMPERATURE: 97.4 F | OXYGEN SATURATION: 98 % | HEIGHT: 70 IN | DIASTOLIC BLOOD PRESSURE: 78 MMHG | BODY MASS INDEX: 32.5 KG/M2 | SYSTOLIC BLOOD PRESSURE: 106 MMHG | HEART RATE: 68 BPM

## 2017-07-14 DIAGNOSIS — M22.2X2 BILATERAL PATELLOFEMORAL SYNDROME: ICD-10-CM

## 2017-07-14 DIAGNOSIS — M06.09 RHEUMATOID ARTHRITIS OF MULTIPLE SITES WITH NEGATIVE RHEUMATOID FACTOR (H): Primary | ICD-10-CM

## 2017-07-14 DIAGNOSIS — Z79.899 HIGH RISK MEDICATION USE: ICD-10-CM

## 2017-07-14 DIAGNOSIS — M22.2X1 BILATERAL PATELLOFEMORAL SYNDROME: ICD-10-CM

## 2017-07-14 PROCEDURE — 20610 DRAIN/INJ JOINT/BURSA W/O US: CPT | Mod: 50 | Performed by: INTERNAL MEDICINE

## 2017-07-14 PROCEDURE — 99213 OFFICE O/P EST LOW 20 MIN: CPT | Mod: 25 | Performed by: INTERNAL MEDICINE

## 2017-07-14 RX ORDER — TRIAMCINOLONE ACETONIDE 40 MG/ML
40 INJECTION, SUSPENSION INTRA-ARTICULAR; INTRAMUSCULAR ONCE
Qty: 1 ML | Refills: 0 | OUTPATIENT
Start: 2017-07-14 | End: 2017-07-14

## 2017-07-14 RX ORDER — METHOTREXATE 2.5 MG/1
15 TABLET ORAL WEEKLY
Qty: 72 TABLET | Refills: 2 | Status: SHIPPED | OUTPATIENT
Start: 2017-07-14 | End: 2018-03-07

## 2017-07-14 RX ORDER — SULFASALAZINE 500 MG/1
1000 TABLET, DELAYED RELEASE ORAL 2 TIMES DAILY
Qty: 360 TABLET | Refills: 2 | Status: SHIPPED | OUTPATIENT
Start: 2017-07-14 | End: 2018-03-07

## 2017-07-14 RX ORDER — FOLIC ACID 1 MG/1
1 TABLET ORAL DAILY
Qty: 100 TABLET | Refills: 3 | Status: SHIPPED | OUTPATIENT
Start: 2017-07-14 | End: 2018-03-07

## 2017-07-14 NOTE — PROGRESS NOTES
Rheumatology Clinic Visit      Vida Whitfield MRN# [id number]   YOB: 1980 Age: 37 year old      Date of visit: 7/14/17   PCP: Dr. Fco Medeiros    Chief Complaint   Patient presents with:  Arthritis: RA, patient states she is having bilateral knee pain, would like injections again. Also a prescription for voltaren gel (has had it in the past)      Assessment and Plan     1. Seronegative nonerosive rheumatoid arthritis: Previously followed at [facility]. Currently on methotrexate 15 mg once weekly (Cr elevation possibly secondary to MTX so MTX was reduced with improvement of Cr), folic acid 1 mg daily, and SSZ 1000mg BID. Doing well today with the exception of infrequent hand pain that she uses Voltaren gel for that is effective. Note that NSAIDs appear to be triggers for her urticaria.   - Continue methotrexate 15mg once weekly  - Continue folic acid 1 mg daily  - Continue sulfasalazine 1000mg BID   - Avoid NSAIDs because they are historically triggers for urticaria  - Voltaren gel for intermittent hand pain  - Labs in 3 months and 2-3 days prior to next rheumatology clinic visit: CBC, creatinine, hepatic panel, ESR, CRP    # On birth control, per patient    2. Bilateral patellofemoral syndrome: Previous steroid injections on 9/20/2016 were beneficial; repeat today as documented in the procedure section.    3. Urticaria: Historically, NSAIDs are triggers    4. Bone health: 9/20/2016 Vitamin D level normal.     Ms. Whitfield verbalized agreement with and understanding of the rational for the diagnosis and treatment plan.  All questions were answered to best of my ability and the patient's satisfaction. Ms. Whitfield was advised to contact the clinic with any questions that may arise after the clinic visit.      Thank you for involving me in the care of the patient    Return to clinic: 6 months, sooner if needed      HPI   Vida Whitfield is a 37 year old female with a medical history significant for  anxiety, depression, chronic idiopathic urticaria, hyperlipidemia, hypothyroidism, rosacea, obstructive sleep apnea, obesity, and inflammatory arthritis who presents for follow-up of rheumatoid arthritis.    Previously documented historical records in this paragraph: She was previously evaluated by Dr. Blaine Anthony at [facility].  2014 labs show negative: hepatitis C ab, HBV surface ag, HBV core ab, HLA-B27, Sm, RNP, SSA, SSB, Scl-70, DNA, cardiolipin, CCP, PR3, MPO. NIKKIE 1:160 homogeneous.  MPO also positive (one negative and one positive value). Per a 3/11/2016 clinic note, she has seronegative rheumatoid arthritis and chronic urticaria. She developed joint swelling and stiffness in the bilateral MCPs, MTPs, ankles, and knees that started in November 2013 shortly after tapering off prednisone that was used to manage chronic urticaria. No history of psoriasis or inflammatory bowel disease. Negative SI joint x-rays. Negative MRI of the SI joint. Flaring of her urticaria with NSAIDs. Steroid responsive in regard to her joints. Near resolution of joint stiffness and pain with methotrexate. NSAIDs are avoided because they cause flares of her urticaria.    Today, Ms. Whitfield reports that she is doing better with the longer duration of sulfasalazine. Morning stiffness for no more than 30 minutes. Occasional finger pain that she has treated with Voltaren gel that is very effective. She feels like she is doing well except for her knees. Bilateral knee pain is worse with activity, especially stairs, and improves with rest; however, she also notes that she gets some stiffness in her knees if she sits in one position for too long.     Denies fevers, chills, nausea, vomiting, constipation, diarrhea. No abdominal pain. No chest pain/pressure, palpitations, or shortness of breath.  No neck pain. No oral or nasal sores.    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  CV: No chest pain, pressure, palpitations, or dyspnea on exertion.  PULM: No SOB, wheeze, cough.  GI: No nausea, vomiting, constipation, diarrhea. No blood in stool. No abdominal pain.  : No blood in urine.  MSK: See HPI.  NEURO: Negative  PSYCH: Negative    Active Problem List     Patient Active Problem List   Diagnosis     Contraceptive management     Hyperhidrosis of axilla     CARDIOVASCULAR SCREENING; LDL GOAL LESS THAN 160     Generalized anxiety disorder     Moderate recurrent major depression (H)     Chronic idiopathic urticaria     Hyperlipidemia with target LDL less than 130     Hypothyroidism, unspecified hypothyroidism type     Rosacea     Non morbid obesity due to excess calories     BRIDGETTE (obstructive sleep apnea)     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 factor (H)     High risk medication use     Past Medical History     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      Pure hypercholesterolemia      Past Surgical History     Past Surgical History:   Procedure Laterality Date     C APPENDECTOMY  2002     CHOLECYSTECTOMY, LAPOROSCOPIC  2006    Cholecystectomy, Laparoscopic     SURGICAL HISTORY OF -   17 years old    Thyroid ablation     Allergy     Allergies   Allergen Reactions     Macrobid [Nitrofuran Derivatives]      Paxil [Paroxetine]      Anxiety         Prednisone      Vomiting      Zoloft      Mood changes     Current Medication List     Current Outpatient Prescriptions   Medication Sig     levothyroxine (SYNTHROID/LEVOTHROID) 175 MCG tablet TAKE ONE TABLET BY MOUTH EVERY DAY     traZODone (DESYREL) 50 MG tablet Take 1-2 tablets by mouth at bedtime.     ALPRAZolam (XANAX) 0.5 MG tablet Take 1 tablet (0.5 mg) by mouth 3 times daily as needed for anxiety     buPROPion (WELLBUTRIN XL) 300 MG 24 hr tablet Take  "1 tablet (300 mg) by mouth every morning     methotrexate sodium 2.5 MG TABS Take 15 mg by mouth once a week . Take all 6 tablets on the same day of each week.     sulfaSALAzine ER (AZULFIDINE EN) 500 MG EC tablet Take 2 tablets (1,000 mg) by mouth 2 times daily     busPIRone (BUSPAR) 15 MG tablet Take 1 tablet (15 mg) by mouth 2 times daily     folic acid (FOLVITE) 1 MG tablet Take 1 tablet (1 mg) by mouth daily     levonorgestrel-ethinyl estradiol (SEASONALE) 0.15-0.03 MG per tablet Take 1 tablet by mouth daily     order for DME Equipment being ordered: CPAP 6-10 cm     clindamycin (CLEOCIN T) 1 % external solution Apply topically 2 times daily     fish oil-omega-3 fatty acids (OMEGA 3) 1000 MG capsule Take 2 capsules (2 g) by mouth daily     Lactobacillus (ACIDOPHILUS PROBIOTIC FORMULA) TABS Take 1 tablet by mouth daily     cetirizine (ZYRTEC ALLERGY) 10 MG tablet Take 20 mg by mouth daily.     Multiple Vitamin (MULTIVITAMIN) per tablet Take 1 tablet by mouth daily.     No current facility-administered medications for this visit.          Social History   See HPI    Family History     Family History   Problem Relation Age of Onset     DIABETES Mother      Hypertension Mother      Lipids Mother      Hypertension Father      Lipids Father      Thyroid Disease Father      CANCER Father      DIABETES Maternal Grandmother        Physical Exam     Temp Readings from Last 3 Encounters:   07/14/17 97.4  F (36.3  C) (Oral)   05/05/17 97.8  F (36.6  C) (Tympanic)   05/03/17 97.8  F (36.6  C)     BP Readings from Last 5 Encounters:   07/14/17 106/78   05/05/17 103/73   05/03/17 133/82   03/29/17 127/75   12/20/16 121/75     Pulse Readings from Last 1 Encounters:   07/14/17 68     Resp Readings from Last 1 Encounters:   05/03/17 16     Estimated body mass index is 32.57 kg/(m^2) as calculated from the following:    Height as of this encounter: 1.778 m (5' 10\").    Weight as of this encounter: 103 kg (227 lb).    GEN: NAD, " overweight  HEENT: MMM.  Anicteric, noninjected sclera  CV: S1, S2. RRR. No m/r/g.  PULM: CTA bilaterally. No w/c.  MSK:  MCPs, PIPs, DIPs, wrists, elbows, shoulders, knees, ankles, and feet without swelling or tenderness to palpation. Negative MCP squeeze. Negative MTP squeeze. Right hip nontender to direct palpation. Left hip mildly tender to direct palpation. .   SKIN: No rash  EXT: No LE edema  PSYCH: Alert. Appropriate.    Labs / Imaging (select studies)     CBC  Recent Labs   Lab Test  06/21/17   1154  03/22/17   1222  02/16/17   0747   WBC  5.5  6.8  6.4   RBC  4.33  4.41  4.53   HGB  14.0  14.3  14.0   HCT  40.7  40.6  41.4   MCV  94  92  91   RDW  12.7  13.7  13.8   PLT  186  176  194   MCH  32.3  32.4  30.9   MCHC  34.4  35.2  33.8   NEUTROPHIL  51.4  47.8  42.2   LYMPH  34.2  41.5  45.4   MONOCYTE  9.8  9.5  11.9   EOSINOPHIL  3.5  0.3  0.0   BASOPHIL  1.1  0.9  0.5   ANEU  2.8  3.3  2.7   ALYM  1.9  2.8  2.9   FREDA  0.5  0.7  0.8   AEOS  0.2  0.0  0.0   ABAS  0.1  0.1  0.0     CMP  Recent Labs   Lab Test  06/21/17   1154  03/22/17   1222  02/16/17   0747  01/26/17   0745  12/20/16   1138   09/02/16   0736  06/19/14   0850  06/27/13   1023  01/26/12 2015 06/08/11   0901   NA   --    --    --    --   138   --    --    --    --   140   --    POTASSIUM   --    --    --    --   4.4   --    --    --    --   4.0   --    CHLORIDE   --    --    --    --   104   --    --    --    --   104   --    CO2   --    --    --    --   30   --    --    --    --   26   --    ANIONGAP   --    --    --    --   4   --    --    --    --   10   --    GLC   --    --    --    --   84   --   88  91  86  90  86   BUN   --    --    --    --   13   --    --    --    --   10   --    CR  1.00  0.94  1.08*  1.12*  1.05*   < >   --    --    --   0.79   --    GFRESTIMATED  63  68  57*  55*  59*   < >   --    --    --   85   --    GFRESTBLACK  76  82  70  67  72   < >   --    --    --   >90   --    TATI   --    --    --    --   8.7   --     --    --    --   8.9   --    BILITOTAL  0.3  0.5  0.5  0.4   --    < >   --    --    --   0.4   --    ALBUMIN  3.5  3.7  3.6  3.5   --    < >   --    --    --   4.1   --    PROTTOTAL  6.9  7.0  7.1  7.1   --    < >   --    --    --   7.5   --    ALKPHOS  45  56  45  41   --    < >   --    --    --   56   --    AST  17  23  15  15   --    < >   --    --    --   27   --    ALT  22  41  25  20   --    < >   --    --    --   21   --     < > = values in this interval not displayed.     Calcium/VitaminD  Recent Labs   Lab Test  12/20/16   1138  09/20/16   1554  01/26/12 2015   TATI  8.7   --   8.9   VITDT   --   36   --      ESR/CRP  Recent Labs   Lab Test  06/21/17   1154  03/22/17   1222  02/16/17   0747   SED  5  5  6   CRP  8.3*  4.0  11.1*     TSH/T4  Recent Labs   Lab Test  06/21/17   1152  06/22/16   1232  05/20/16   0732  05/21/15   0850   TSH  1.18  0.07*  0.03*  0.68   T4   --   1.27  1.58*  1.37     Immunization History     Immunization History   Administered Date(s) Administered     DPT 1980, 1980, 1980, 11/02/1982     HepB-Peds 07/22/1999     Influenza (IIV3) 11/05/1996     MMR 09/07/1992     OPV 1980, 1981, 1981, 11/02/1982     Pneumococcal (PCV 13) 09/20/2016     Pneumococcal 23 valent 12/20/2016     TD (ADULT, 7+) 07/14/1995, 01/17/2006     Tdap (Adacel,Boostrix) 12/10/2013       Procedure     Procedure: Steroid injection of the bilateral knees  Indication: Pain, bilateral patellofemoral syndrome    The procedure was explained in detail. Risks including infection, pain, structural damage such as cartilage damage and tendon rupture, fat atrophy, skin hyper-/hypo-pigmentation, and medication reaction was explained. The need for rest of the affected joint for one week after the procedure was explained.  The option of not doing the procedure was also provided. All questions were answered and the patient consented to the procedure.     A time-out was performed and the  correct patient, procedure, and laterality were verified.    The right knee was examined and location for injection was identified - anterior medial. The area was cleaned with chlorhexidine, twice.  Ethyl chloride was then used for topical anaesthetic.  Then a mixture of lidocaine 1% 2 mL and Kenalog 40mg was injected into the intra-articular space.     The left knee was examined and location for injection was identified - anterior medial. The area was cleaned with chlorhexidine, twice.  Ethyl chloride was then used for topical anaesthetic.  Then a mixture of lidocaine 1% 2 mL and Kenalog 40mg was injected into the intra-articular space.     The patient tolerated the procedure well. No complications.    MEDICATION: Kenalog 40 mg  LOT #: [id number]   : [employer]  EXPIRATION DATE: 10/01/2018  NDC#: 1850-5550-31     MEDICATION: Kenalog 40 mg  LOT #: [id number]   : [employer]  EXPIRATION DATE: 10/01/2018  NDC#: 2632-6555-87          Chart documentation done in part with Dragon Voice recognition Software. Although reviewed after completion, some word and grammatical error may remain.    Andrea Benítez MD

## 2017-07-14 NOTE — NURSING NOTE
"Chief Complaint   Patient presents with     Arthritis     RA, patient states she is having bilateral knee pain, would like injections again. Also a prescription for voltaren gel (has had it in the past)       Initial /78 (BP Location: Left arm, Patient Position: Chair, Cuff Size: Adult Large)  Pulse 68  Temp 97.4  F (36.3  C) (Oral)  Ht 1.778 m (5' 10\")  Wt 103 kg (227 lb)  SpO2 98%  BMI 32.57 kg/m2 Estimated body mass index is 32.57 kg/(m^2) as calculated from the following:    Height as of this encounter: 1.778 m (5' 10\").    Weight as of this encounter: 103 kg (227 lb).  BP completed using cuff size: large         RAPID3 (0-30) Cumulative Score  8.0          RAPID3 Weighted Score (divide #4 by 3 and that is the weighted score)  2.67         "

## 2017-07-14 NOTE — PATIENT INSTRUCTIONS
[facility]  Locations Clinic Hours Telephone Number   Dam[facility]  [address]  [place] [address]     Wednesday: 7:20AM - 4:00PM  Thursday:     7:20AM - 4:00PM     Friday:          7:20AM - 11:00AM       To schedule an appointment with  Dr. Benítez,  please contact  Specialty [phone]       Damaris [place]  [address]  [place] [address]       Monday:       7:20AM - 4:00PM      Damaris Emerson  [address]  [place] [address]       Tuesday:      7:20AM - 4:00PM          Thank you!    Nicolette Sarmiento CMA

## 2017-07-14 NOTE — MR AVS SNAPSHOT
After Visit Summary   2017    Vida Whitfield    MRN: [id number]           Patient Information     Date Of Birth          1980        Visit Information        Provider Department      2017 8:40 AM Andrea Benítez MD [facility]        Today's Diagnoses     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 factor (H)    -  1    High risk medication use        Bilateral patellofemoral syndrome          Care Instructions      Dr. Benítez s Rheumatology Clinics  Locations Clinic Hours Telephone Number   [facility]  [address]  [place] [address]     Wednesday: 7:20AM - 4:00PM  Thursday:     7:20AM - 4:00PM     Friday:          7:20AM - 11:00AM       To schedule an appointment with  Dr. Benítez,  please contact  Specialty [phone]       [place] [facility]  [address]  [place] [address]       Monday:       7:20AM - 4:00PM      [facility]  [address] [place] [address]       Tuesday:      7:20AM - 4:00PM          Thank you!    Nciolette Sarmiento CMA              Follow-ups after your visit        Your next 10 appointments already scheduled     Oct 11, 2017 12:00 PM CDT   LAB with [place] [facility]   [facility]    [address] [address]   [phone]           Patient must bring picture ID.  Patient should be prepared to give a urine specimen  Please do not eat 10-12 hours before your appointment if you are coming in fasting for labs on lipids, cholesterol, or glucose (sugar).  Pregnant women should follow their Care Team instructions. Water with medications is okay. Do not drink coffee or other fluids.   If you have concerns about taking  your medications, please ask at office or if scheduling via [employer], send a message by clicking on Secure Messaging, Message Your Care Team.            Eleazar 10, 2018 12:00 PM CST   LAB with [place] [facility]   [place]  [facility] ([facility])    [address]  [facility] [address]   [phone]           Patient must bring picture ID.  Patient should be prepared to give a urine specimen  Please do not eat 10-12 hours before your appointment if you are coming in fasting for labs on lipids, cholesterol, or glucose (sugar).  Pregnant women should follow their Care Team instructions. Water with medications is okay. Do not drink coffee or other fluids.   If you have concerns about taking  your medications, please ask at office or if scheduling via Alere, send a message by clicking on Secure Messaging, Message Your Care Team.            Jan 12, 2018 11:00 AM CST   [facility] Visit with Andrea Benítez MD   [facility]    [address] [address] [phone]              Future tests that were ordered for you today     Open Future Orders        Priority Expected Expires Ordered    CBC with platelets differential Routine 10/8/2017 10/27/2017 7/14/2017    Creatinine Routine 10/8/2017 10/27/2017 7/14/2017    Hepatic panel Routine 10/8/2017 10/27/2017 7/14/2017    Hepatic panel Routine 1/8/2018 2/10/2018 7/14/2017    CRP inflammation Routine 1/8/2018 2/10/2018 7/14/2017    Erythrocyte sedimentation rate auto Routine 1/8/2018 2/10/2018 7/14/2017    Creatinine Routine 1/8/2018 2/10/2018 7/14/2017    CBC with platelets differential Routine 1/8/2018 2/10/2018 7/14/2017            Who to contact     If you have questions or need follow up information about today's clinic visit or your schedule please contact [facility] directly at [phone].  Normal or non-critical lab and imaging results will be communicated to you by MyChart, letter or phone within 4 business days after the clinic has received the results. If you do not hear from us within 7 days, please contact the clinic through MyChart or phone. If you have a critical or abnormal lab  "result, we will notify you by phone as soon as possible.  Submit refill requests through [employer] or call your pharmacy and they will forward the refill request to us. Please allow 3 business days for your refill to be completed.          Additional Information About Your Visit        [employer] Information     [employer] gives you secure access to your electronic health record. If you see a primary care provider, you can also send messages to your care team and make appointments. If you have questions, please call your primary care clinic.  If you do not have a primary care provider, please call [phone] and they will assist you.        Care EveryWhere ID     This is your Care EveryWhere ID. This could be used by other organizations to access your [place] medical records  [id number]        Your Vitals Were     Pulse Temperature Height Pulse Oximetry BMI (Body Mass Index)       68 97.4  F (36.3  C) (Oral) 1.778 m (5' 10\") 98% 32.57 kg/m2        Blood Pressure from Last 3 Encounters:   07/14/17 106/78   05/05/17 103/73   05/03/17 133/82    Weight from Last 3 Encounters:   07/14/17 103 kg (227 lb)   05/05/17 90.7 kg (200 lb)   05/03/17 99.8 kg (220 lb)                 Today's Medication Changes          These changes are accurate as of: 7/14/17  9:14 AM.  If you have any questions, ask your nurse or doctor.               Start taking these medicines.        Dose/Directions    diclofenac 1 % Gel topical gel   Commonly known as:  VOLTAREN   Used for:  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 factor (H)   Started by:  Andrea Benítez MD        Apply 2 grams to hands four times daily using enclosed dosing card.   Quantity:  100 g   Refills:  3            Where to get your medicines      These medications were sent to [facility] [place], [place] - [address]  [address] [address]     Phone:  [phone]     diclofenac 1 % Gel topical gel    folic acid 1 MG tablet "    methotrexate sodium 2.5 MG Tabs    sulfaSALAzine  MG EC tablet                Primary Care Provider Office Phone # Fax #    Fco Medeiros [phone]       [facility] [address] [address]        Equal Access to Services     ZEB MONROE : Hadii daryl ku hadasho Soomaali, waaxda luqadaha, qaybta kaalmada adeegyada, waxay edie hayelviran madalyn kleintoribiocharlene galvin. So [facility] [phone].    ATENCIÓN: Si habla español, tiene a gonzalez disposición servicios gratuitos de asistencia lingüística. Eli al [phone].    We comply with applicable federal civil rights laws and [place] laws. We do not discriminate on the basis of race, color, national origin, age, disability sex, sexual orientation or gender identity.            Thank you!     Thank you for choosing [facility]  for your care. Our goal is always to provide you with excellent care. Hearing back from our patients is one way we can continue to improve our services. Please take a few minutes to complete the written survey that you may receive in the mail after your visit with us. Thank you!             Your Updated Medication List - Protect others around you: Learn how to safely use, store and throw away your medicines at www.disposemymeds.org.          This list is accurate as of: 7/14/17  9:14 AM.  Always use your most recent med list.                   Brand Name Dispense Instructions for use Diagnosis    ACIDOPHILUS PROBIOTIC FORMULA Tabs      Take 1 tablet by mouth daily        ALPRAZolam 0.5 MG tablet    XANAX    20 tablet    Take 1 tablet (0.5 mg) by mouth 3 times daily as needed for anxiety    Generalized anxiety disorder       buPROPion 300 MG 24 hr tablet    WELLBUTRIN XL    90 tablet    Take 1 tablet (300 mg) by mouth every morning    Moderate recurrent major depression (H)       busPIRone 15 MG tablet    BUSPAR    180 tablet    Take 1 tablet (15 mg) by mouth 2 times daily    Moderate  recurrent major depression (H)       clindamycin 1 % solution    CLEOCIN T    60 mL    Apply topically 2 times daily    Rosacea       diclofenac 1 % Gel topical gel    VOLTAREN    100 g    Apply 2 grams to hands four times daily using enclosed dosing card.    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 factor (H)       fish oil-omega-3 fatty acids 1000 MG capsule     90 capsule    Take 2 capsules (2 g) by mouth daily        folic acid 1 MG tablet    FOLVITE    100 tablet    Take 1 tablet (1 mg) by mouth daily    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 factor (H)       levonorgestrel-ethinyl estradiol 0.15-0.03 MG per tablet    SEASONALE    90 tablet    Take 1 tablet by mouth daily    Encounter for contraceptive management, unspecified contraceptive encounter type       levothyroxine 175 MCG tablet    SYNTHROID/LEVOTHROID    90 tablet    TAKE ONE TABLET BY MOUTH EVERY DAY    Hypothyroidism, unspecified type       methotrexate sodium 2.5 MG Tabs     72 tablet    Take 15 mg by mouth once a week . Take all 6 tablets on the same day of each week.    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 factor (H)       multivitamin per tablet     100 tablet    Take 1 tablet by mouth daily.        order for DME      Equipment being ordered: CPAP 6-10 cm        sulfaSALAzine  MG EC tablet    AZULFIDINE EN    360 tablet    Take 2 tablets (1,000 mg) by mouth 2 times daily    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 factor (H)       traZODone 50 MG tablet    DESYREL    180 tablet    Take 1-2 tablets by mouth at bedtime.    Chronic depressive personality disorder       ZYRTEC ALLERGY 10 MG tablet   Generic drug:  cetirizine      Take 20 mg by mouth daily.

## 2017-07-14 NOTE — NURSING NOTE
The following medication was given:     MEDICATION: Kenalog 40 mg  SITE: Right knee  DOSE: 1 ml  LOT #: [id number]   :  [employer]  EXPIRATION DATE:  10/01/2018  NDC#: 8327-0837-23     MEDICATION: Kenalog 40 mg  SITE: Left knee  DOSE: 1 ml  LOT #: [id number]   :  [employer]  EXPIRATION DATE:  10/01/2018  NDC#: 3475-7121-27

## 2017-08-19 ENCOUNTER — HEALTH MAINTENANCE LETTER (OUTPATIENT)
Age: 37
End: 2017-08-19

## 2017-08-28 ASSESSMENT — PATIENT HEALTH QUESTIONNAIRE - PHQ9
10. IF YOU CHECKED OFF ANY PROBLEMS, HOW DIFFICULT HAVE THESE PROBLEMS MADE IT FOR YOU TO DO YOUR WORK, TAKE CARE OF THINGS AT HOME, OR GET ALONG WITH OTHER PEOPLE: SOMEWHAT DIFFICULT
SUM OF ALL RESPONSES TO PHQ QUESTIONS 1-9: 9
SUM OF ALL RESPONSES TO PHQ QUESTIONS 1-9: 9

## 2017-08-29 ASSESSMENT — PATIENT HEALTH QUESTIONNAIRE - PHQ9: SUM OF ALL RESPONSES TO PHQ QUESTIONS 1-9: 9

## 2017-08-30 ENCOUNTER — OFFICE VISIT (OUTPATIENT)
Dept: FAMILY MEDICINE | Facility: CLINIC | Age: 37
End: 2017-08-30
Payer: COMMERCIAL

## 2017-08-30 VITALS
HEART RATE: 81 BPM | BODY MASS INDEX: 32.93 KG/M2 | SYSTOLIC BLOOD PRESSURE: 125 MMHG | DIASTOLIC BLOOD PRESSURE: 69 MMHG | HEIGHT: 70 IN | TEMPERATURE: 98.5 F | WEIGHT: 230 LBS

## 2017-08-30 DIAGNOSIS — Z00.00 ROUTINE HISTORY AND PHYSICAL EXAMINATION OF ADULT: Primary | ICD-10-CM

## 2017-08-30 DIAGNOSIS — F33.1 MODERATE RECURRENT MAJOR DEPRESSION (H): ICD-10-CM

## 2017-08-30 DIAGNOSIS — E03.9 HYPOTHYROIDISM, UNSPECIFIED TYPE: ICD-10-CM

## 2017-08-30 PROCEDURE — G0145 SCR C/V CYTO,THINLAYER,RESCR: HCPCS | Performed by: FAMILY MEDICINE

## 2017-08-30 PROCEDURE — 99395 PREV VISIT EST AGE 18-39: CPT | Performed by: FAMILY MEDICINE

## 2017-08-30 PROCEDURE — 87624 HPV HI-RISK TYP POOLED RSLT: CPT | Performed by: FAMILY MEDICINE

## 2017-08-30 RX ORDER — LEVOTHYROXINE SODIUM 175 UG/1
175 TABLET ORAL DAILY
Qty: 90 TABLET | Refills: 3 | Status: SHIPPED | OUTPATIENT
Start: 2017-08-30 | End: 2018-09-13

## 2017-08-30 RX ORDER — LEVONORGESTREL AND ETHINYL ESTRADIOL 0.15-0.03
1 KIT ORAL DAILY
Qty: 90 TABLET | Refills: 3 | Status: SHIPPED | OUTPATIENT
Start: 2017-08-30 | End: 2018-09-07

## 2017-08-30 RX ORDER — TRAZODONE HYDROCHLORIDE 50 MG/1
TABLET, FILM COATED ORAL
Qty: 180 TABLET | Refills: 3 | Status: SHIPPED | OUTPATIENT
Start: 2017-08-30 | End: 2018-10-05

## 2017-08-30 RX ORDER — BUPROPION HYDROCHLORIDE 300 MG/1
300 TABLET ORAL EVERY MORNING
Qty: 90 TABLET | Refills: 3 | Status: SHIPPED | OUTPATIENT
Start: 2017-08-30 | End: 2018-10-05

## 2017-08-30 RX ORDER — BUSPIRONE HYDROCHLORIDE 15 MG/1
15 TABLET ORAL 2 TIMES DAILY
Qty: 180 TABLET | Refills: 3 | Status: SHIPPED | OUTPATIENT
Start: 2017-08-30 | End: 2018-10-01

## 2017-08-30 NOTE — PATIENT INSTRUCTIONS
Preventive Health Recommendations  Female Ages 26 - 39  Yearly exam:   See your health care provider every year in order to    Review health changes.     Discuss preventive care.      Review your medicines if you your doctor has prescribed any.    Until age 30: Get a Pap test every three years (more often if you have had an abnormal result).    After age 30: Talk to your doctor about whether you should have a Pap test every 3 years or have a Pap test with HPV screening every 5 years.   You do not need a Pap test if your uterus was removed (hysterectomy) and you have not had cancer.  You should be tested each year for STDs (sexually transmitted diseases), if you're at risk.   Talk to your provider about how often to have your cholesterol checked.  If you are at risk for diabetes, you should have a diabetes test (fasting glucose).  Shots: Get a flu shot each year. Get a tetanus shot every 10 years.   Nutrition:     Eat at least 5 servings of fruits and vegetables each day.    Eat whole-grain bread, whole-wheat pasta and brown rice instead of white grains and rice.    Talk to your provider about Calcium and Vitamin D.     Lifestyle    Exercise at least 150 minutes a week (30 minutes a day, 5 days of the week). This will help you control your weight and prevent disease.    Limit alcohol to one drink per day.    No smoking.     Wear sunscreen to prevent skin cancer.    See your dentist every six months for an exam and cleaning.            Thank you for choosing [facility].  You may be receiving a survey in the mail from [email or web address] regarding your visit today.  Please take a few minutes to complete and return the survey to let us know how we are doing.      If you have questions or concerns, please contact us via [employer] or you can contact your care team at [phone].    Our Clinic hours are:  Monday 6:40 am  to 7:00 pm  Tuesday -Friday 6:40 am to 5:00 pm    The [place] outpatient lab hours are:  Monday -  "Friday 6:10 am to 4:45 pm  Saturdays 7:00 am to 11:00 am  Appointments are required, call [phone]    If you have clinical questions after hours or would like to schedule an appointment,  call the clinic at [phone].    ASSESSMENT/PLAN:   1. Routine history and physical examination of adult  COUNSELING:   Reviewed preventive health counseling, as reflected in patient instructions       Regular exercise       Healthy diet/nutrition       Vision screening       Hearing screening   reports that she has quit smoking. She has never used smokeless tobacco.  Estimated body mass index is 33 kg/(m^2) as calculated from the following:    Height as of this encounter: 5' 10\" (1.778 m).    Weight as of this encounter: 230 lb (104.3 kg).   Counseling Resources:  ATP IV Guidelines  Pooled Cohorts Equation Calculator  Breast Cancer Risk Calculator  FRAX Risk Assessment  ICSI Preventive Guidelines  Dietary Guidelines for Americans, 2010  [employer] MyPlate  ASA Prophylaxis  Lung CA Screening  - Pap imaged thin layer screen with HPV - recommended age 30 - 65 years (select HPV order below)  - levonorgestrel-ethinyl estradiol (SEASONALE) 0.15-0.03 MG per tablet; Take 1 tablet by mouth daily  Dispense: 90 tablet; Refill: 3  - **CBC with platelets FUTURE anytime; Future    2. Hypothyroidism, unspecified type  Stay on the med and monitor for the symptoms of high and low thyroid. If doing well then refill and recheck annually and do the labs before the appt.   - levothyroxine (SYNTHROID/LEVOTHROID) 175 MCG tablet; Take 1 tablet (175 mcg) by mouth daily  Dispense: 90 tablet; Refill: 3  - **TSH with free T4 reflex FUTURE anytime; Future    3. Moderate recurrent major depression (H)  Stay on the meds and the non drug therapies. These are refilled for one year. If doing well then recheck annually and continue the counseling.   Recheck in the clinic in one year if well.   - buPROPion (WELLBUTRIN XL) 300 MG 24 hr tablet; Take 1 tablet (300 " mg) by mouth every morning  Dispense: 90 tablet; Refill: 3  - busPIRone (BUSPAR) 15 MG tablet; Take 1 tablet (15 mg) by mouth 2 times daily  Dispense: 180 tablet; Refill: 3

## 2017-08-30 NOTE — NURSING NOTE
"Chief Complaint   Patient presents with     Physical     Pap and physical.       Initial /69  Pulse 81  Temp 98.5  F (36.9  C) (Tympanic)  Ht 5' 10\" (1.778 m)  Wt 230 lb (104.3 kg)  LMP 06/01/2017  BMI 33 kg/m2 Estimated body mass index is 33 kg/(m^2) as calculated from the following:    Height as of this encounter: 5' 10\" (1.778 m).    Weight as of this encounter: 230 lb (104.3 kg).  Medication Reconciliation: complete  "

## 2017-08-30 NOTE — PROGRESS NOTES
SUBJECTIVE:   CC: Vida Whitfield is an 37 year old woman who presents for preventive health visit.     Healthy Habits:    Do you get at least three servings of calcium containing foods daily (dairy, green leafy vegetables, etc.)? yes    Amount of exercise or daily activities, outside of work: 2-3 day(s) per week    Problems taking medications regularly No    Medication side effects: No    Have you had an eye exam in the past two years? yes    Do you see a dentist twice per year? yes    Do you have sleep apnea, excessive snoring or daytime drowsiness?Yes, sleep apnea. Uses the CPAP      Chief Complaint   Patient presents with     Physical     Pap and physical.     Contraception     Refill of birth control.     Today's PHQ-2 Score:   PHQ-2 ( 1999 [employer]) 8/28/2017 8/30/2016   Q1: Little interest or pleasure in doing things 2 -   Q2: Feeling down, depressed or hopeless 2 -   PHQ-2 Score 4 -   Q1: Little interest or pleasure in doing things More than half the days Not at all   Q2: Feeling down, depressed or hopeless More than half the days Not at all   PHQ-2 Score 4 0       PHQ-9 ([employer]) 8/28/2017   1.  Little interest or pleasure in doing things More than half the days   2.  Feeling down, depressed, or hopeless More than half the days   3.  Trouble falling or staying asleep, or sleeping too much Several days   4.  Feeling tired or having little energy Several days   5.  Poor appetite or overeating More than half the days   6.  Feeling bad about yourself Not at all   7.  Trouble concentrating Several days   8.  Moving slowly or restless Not at all   9.  Suicidal or self-harm thoughts Not at all   PHQ-9 via MyChart TOTAL SCORE-----> 9 (Mild depression)   Difficulty at work, home, or with people Somewhat difficult     Abuse: Current or Past(Physical, Sexual or Emotional)- No  Do you feel safe in your environment - Yes    Social History   Substance Use Topics     Smoking status: Former Smoker     Smokeless tobacco:  "Never Used      Comment: Quit in 2002.     Alcohol use No     The patient does not drink >3 drinks per day nor >7 drinks per week.    Reviewed orders with patient.  Reviewed health maintenance and updated orders accordingly - Yes    Mammograms will start at 40.     Reviewed and updated as needed this visit by clinical staff    Reviewed and updated as needed this visit by Provider    ROS:  C: NEGATIVE for fever, chills, change in weight  I: NEGATIVE for worrisome rashes, moles or lesions  E: NEGATIVE for vision changes or irritation  ENT: NEGATIVE for ear, mouth and throat problems  R: NEGATIVE for significant cough or SOB  B: NEGATIVE for masses, tenderness or discharge  CV: NEGATIVE for chest pain, palpitations or peripheral edema  GI: NEGATIVE for nausea, abdominal pain, heartburn, or change in bowel habits  : NEGATIVE for unusual urinary or vaginal symptoms. Periods are regular.  M: NEGATIVE for significant arthralgias or myalgia  N: NEGATIVE for weakness, dizziness or paresthesias    OBJECTIVE:   /69  Pulse 81  Temp 98.5  F (36.9  C) (Tympanic)  Ht 5' 10\" (1.778 m)  Wt 230 lb (104.3 kg)  LMP 06/01/2017  BMI 33 kg/m2  EXAM:  GENERAL APPEARANCE: healthy, alert and no distress  EYES: EOMI,  PERRL  HENT: ear canals and TM's normal and nose and mouth without ulcers or lesions  NECK: no adenopathy, no asymmetry, masses, or scars and thyroid normal to palpation  RESP: lungs clear to auscultation - no rales, rhonchi or wheezes  BREAST: normal without masses, tenderness or nipple discharge and no palpable axillary masses or adenopathy  CV: regular rates and rhythm, normal S1 S2, no S3 or S4 and no murmur, click or rub -  ABDOMEN:  soft, nontender, no HSM or masses and bowel sounds normal  : normal cervix, adnexae, and uterus without masses or discharge and rectal exam normal without masses-guaiac negative stool  MS: extremities normal- no gross deformities noted, no evidence of inflammation in joints, " "FROM in all extremities.  SKIN: no suspicious lesions or rashes  NEURO: Normal strength and tone, sensory exam grossly normal, mentation intact and speech normal  PSYCH: mentation appears normal and affect normal/bright  LYMPHATICS: No axillary, cervical, inguinal, or supraclavicular nodes      ASSESSMENT/PLAN:   1. Routine history and physical examination of adult  COUNSELING:   Reviewed preventive health counseling, as reflected in patient instructions       Regular exercise       Healthy diet/nutrition       Vision screening       Hearing screening   reports that she has quit smoking. She has never used smokeless tobacco.  Estimated body mass index is 33 kg/(m^2) as calculated from the following:    Height as of this encounter: 5' 10\" (1.778 m).    Weight as of this encounter: 230 lb (104.3 kg).   Counseling Resources:  ATP IV Guidelines  Pooled Cohorts Equation Calculator  Breast Cancer Risk Calculator  FRAX Risk Assessment  ICSI Preventive Guidelines  Dietary Guidelines for Americans, 2010  [employer] MyPlate  ASA Prophylaxis  Lung CA Screening  - Pap imaged thin layer screen with HPV - recommended age 30 - 65 years (select HPV order below)  - levonorgestrel-ethinyl estradiol (SEASONALE) 0.15-0.03 MG per tablet; Take 1 tablet by mouth daily  Dispense: 90 tablet; Refill: 3  - **CBC with platelets FUTURE anytime; Future    2. Hypothyroidism, unspecified type  Stay on the med and monitor for the symptoms of high and low thyroid. If doing well then refill and recheck annually and do the labs before the appt.   - levothyroxine (SYNTHROID/LEVOTHROID) 175 MCG tablet; Take 1 tablet (175 mcg) by mouth daily  Dispense: 90 tablet; Refill: 3  - **TSH with free T4 reflex FUTURE anytime; Future    3. Moderate recurrent major depression (H)  Stay on the meds and the non drug therapies. These are refilled for one year. If doing well then recheck annually and continue the counseling.   Recheck in the clinic in one year if well.   - " buPROPion (WELLBUTRIN XL) 300 MG 24 hr tablet; Take 1 tablet (300 mg) by mouth every morning  Dispense: 90 tablet; Refill: 3  - busPIRone (BUSPAR) 15 MG tablet; Take 1 tablet (15 mg) by mouth 2 times daily  Dispense: 180 tablet; Refill: 3        Fco Medeiros MD  [facility]

## 2017-08-30 NOTE — MR AVS SNAPSHOT
After Visit Summary   8/30/2017    Vida Whitfield    MRN: [id number]           Patient Information     Date Of Birth          1980        Visit Information        Provider Department      8/30/2017 2:40 PM Fco Medeiros MD [facility]        Today's Diagnoses     Routine history and physical examination of adult    -  1    Hypothyroidism, unspecified type        Moderate recurrent major depression (H)          Care Instructions      Preventive Health Recommendations  Female Ages 26 - 39  Yearly exam:   See your health care provider every year in order to    Review health changes.     Discuss preventive care.      Review your medicines if you your doctor has prescribed any.    Until age 30: Get a Pap test every three years (more often if you have had an abnormal result).    After age 30: Talk to your doctor about whether you should have a Pap test every 3 years or have a Pap test with HPV screening every 5 years.   You do not need a Pap test if your uterus was removed (hysterectomy) and you have not had cancer.  You should be tested each year for STDs (sexually transmitted diseases), if you're at risk.   Talk to your provider about how often to have your cholesterol checked.  If you are at risk for diabetes, you should have a diabetes test (fasting glucose).  Shots: Get a flu shot each year. Get a tetanus shot every 10 years.   Nutrition:     Eat at least 5 servings of fruits and vegetables each day.    Eat whole-grain bread, whole-wheat pasta and brown rice instead of white grains and rice.    Talk to your provider about Calcium and Vitamin D.     Lifestyle    Exercise at least 150 minutes a week (30 minutes a day, 5 days of the week). This will help you control your weight and prevent disease.    Limit alcohol to one drink per day.    No smoking.     Wear sunscreen to prevent skin cancer.    See your dentist every six months for an exam and cleaning.            Thank you for  "choosing [facility].  You may be receiving a survey in the mail from Jamie Dye regarding your visit today.  Please take a few minutes to complete and return the survey to let us know how we are doing.      If you have questions or concerns, please contact us via PatientsLikeMe or you can contact your care team at [phone].    Our Clinic hours are:  Monday 6:40 am  to 7:00 pm  Tuesday -Friday 6:40 am to 5:00 pm    The [place] outpatient lab hours are:  Monday - Friday 6:10 am to 4:45 pm  Saturdays 7:00 am to 11:00 am  Appointments are required, call [phone]    If you have clinical questions after hours or would like to schedule an appointment,  call the clinic at [phone].    ASSESSMENT/PLAN:   1. Routine history and physical examination of adult  COUNSELING:   Reviewed preventive health counseling, as reflected in patient instructions       Regular exercise       Healthy diet/nutrition       Vision screening       Hearing screening   reports that she has quit smoking. She has never used smokeless tobacco.  Estimated body mass index is 33 kg/(m^2) as calculated from the following:    Height as of this encounter: 5' 10\" (1.778 m).    Weight as of this encounter: 230 lb (104.3 kg).   Counseling Resources:  ATP IV Guidelines  Pooled Cohorts Equation Calculator  Breast Cancer Risk Calculator  FRAX Risk Assessment  ICSI Preventive Guidelines  Dietary Guidelines for Americans, 2010  USDA's MyPlate  ASA Prophylaxis  Lung CA Screening  - Pap imaged thin layer screen with HPV - recommended age 30 - 65 years (select HPV order below)  - levonorgestrel-ethinyl estradiol (SEASONALE) 0.15-0.03 MG per tablet; Take 1 tablet by mouth daily  Dispense: 90 tablet; Refill: 3  - **CBC with platelets FUTURE anytime; Future    2. Hypothyroidism, unspecified type  Stay on the med and monitor for the symptoms of high and low thyroid. If doing well then refill and recheck annually and do the labs before the appt.   - " levothyroxine (SYNTHROID/LEVOTHROID) 175 MCG tablet; Take 1 tablet (175 mcg) by mouth daily  Dispense: 90 tablet; Refill: 3  - **TSH with free T4 reflex FUTURE anytime; Future    3. Moderate recurrent major depression (H)  Stay on the meds and the non drug therapies. These are refilled for one year. If doing well then recheck annually and continue the counseling.   Recheck in the clinic in one year if well.   - buPROPion (WELLBUTRIN XL) 300 MG 24 hr tablet; Take 1 tablet (300 mg) by mouth every morning  Dispense: 90 tablet; Refill: 3  - busPIRone (BUSPAR) 15 MG tablet; Take 1 tablet (15 mg) by mouth 2 times daily  Dispense: 180 tablet; Refill: 3          Follow-ups after your visit        Your next 10 appointments already scheduled     Sep 06, 2017  2:30 PM CDT   [facility] Sleep Medicine Return with SOLO Nguyen   [facility] ([facility])    [address]  [facility] [address]   [phone]            Sep 20, 2017  1:20 PM CDT   New Visit with Sandra Shukla PA-C   [facility]    [address] [address]   [phone]            Oct 11, 2017 12:00 PM CDT   LAB with [facility] ([facility])    [address] [address]   [phone]           Patient must bring picture ID. Patient should be prepared to give a urine specimen  Please do not eat 10-12 hours before your appointment if you are coming in fasting for labs on lipids, cholesterol, or glucose (sugar). Pregnant women should follow their Care Team instructions. Water with medications is okay. Do not drink coffee or other fluids. If you have concerns about taking  your medications, please ask at office or if scheduling via [employer], send a message by clicking on Secure Messaging, Message Your Care Team.            Eleazar 10, 2018 12:00 PM CST   LAB with [facility]  ([facility])    [address]  [facility] [address]   [phone]           Patient must bring picture ID. Patient should be prepared to give a urine specimen  Please do not eat 10-12 hours before your appointment if you are coming in fasting for labs on lipids, cholesterol, or glucose (sugar). Pregnant women should follow their Care Team instructions. Water with medications is okay. Do not drink coffee or other fluids. If you have concerns about taking  your medications, please ask at office or if scheduling via [employer], send a message by clicking on Secure Messaging, Message Your Care Team.            Jan 12, 2018 11:00 AM CST   Return Visit with Andrea Benítez MD   [facility]    [address]  [place] [id number] [phone]              Future tests that were ordered for you today     Open Future Orders        Priority Expected Expires Ordered    **TSH with free T4 reflex FUTURE anytime Routine 8/30/2017 8/30/2018 8/30/2017    **CBC with platelets FUTURE anytime Routine 8/30/2017 8/30/2018 8/30/2017            Who to contact     If you have questions or need follow up information about today's clinic visit or your schedule please contact [facility] directly at [phone].  Normal or non-critical lab and imaging results will be communicated to you by RELEASEIFhart, letter or phone within 4 business days after the clinic has received the results. If you do not hear from us within 7 days, please contact the clinic through RELEASEIFhart or phone. If you have a critical or abnormal lab result, we will notify you by phone as soon as possible.  Submit refill requests through [employer] or call your pharmacy and they will forward the refill request to us. Please allow 3 business days for your refill to be completed.          Additional Information About Your Visit        [employer] Information     [employer] gives you secure access to your  "electronic health record. If you see a primary care provider, you can also send messages to your care team and make appointments. If you have questions, please call your primary care clinic.  If you do not have a primary care provider, please call [phone] and they will assist you.        Care EveryWhere ID     This is your Care EveryWhere ID. This could be used by other organizations to access your [place] medical records  [id number]        Your Vitals Were     Pulse Temperature Height Last Period BMI (Body Mass Index)       81 98.5  F (36.9  C) (Tympanic) 5' 10\" (1.778 m) 06/01/2017 33 kg/m2        Blood Pressure from Last 3 Encounters:   08/30/17 125/69   07/14/17 106/78   05/05/17 103/73    Weight from Last 3 Encounters:   08/30/17 230 lb (104.3 kg)   07/14/17 227 lb (103 kg)   05/05/17 200 lb (90.7 kg)              We Performed the Following     OFFICE/OUTPT VISIT,EST,LEVL III     Pap imaged thin layer screen with HPV - recommended age 30 - 65 years (select HPV order below)          Today's Medication Changes          These changes are accurate as of: 8/30/17  3:36 PM.  If you have any questions, ask your nurse or doctor.               These medicines have changed or have updated prescriptions.        Dose/Directions    levothyroxine 175 MCG tablet   Commonly known as:  SYNTHROID/LEVOTHROID   This may have changed:  See the new instructions.   Used for:  Hypothyroidism, unspecified type   Changed by:  Fco Medeiros MD        Dose:  175 mcg   Take 1 tablet (175 mcg) by mouth daily   Quantity:  90 tablet   Refills:  3            Where to get your medicines      These medications were sent to [facility] [address]  [address] [address]     Phone:  [phone]     buPROPion 300 MG 24 hr tablet    busPIRone 15 MG tablet    levonorgestrel-ethinyl estradiol 0.15-0.03 MG per tablet    levothyroxine 175 MCG tablet    traZODone 50 MG tablet          "       Primary Care Provider Office Phone # Fax #    Fco Medeiros [phone] [address] [address]        Equal Access to Services     ZEB MONROE : Hadii aad ku hadvaabe Joy, waaxda luqadaha, qaybta kaalmada angel, vikki dan frankymoira spivey nathaniel galvin. So [facility] [phone].    ATENCIÓN: Si habla español, tiene a gonzalez disposición servicios gratuitos de asistencia lingüística. Llame al [phone].    We comply with applicable federal civil rights laws and [place] laws. We do not discriminate on the basis of race, color, national origin, age, disability sex, sexual orientation or gender identity.            Thank you!     Thank you for choosing [facility]  for your care. Our goal is always to provide you with excellent care. Hearing back from our patients is one way we can continue to improve our services. Please take a few minutes to complete the written survey that you may receive in the mail after your visit with us. Thank you!             Your Updated Medication List - Protect others around you: Learn how to safely use, store and throw away your medicines at www.disposemymeds.org.          This list is accurate as of: 8/30/17  3:36 PM.  Always use your most recent med list.                   Brand Name Dispense Instructions for use Diagnosis    ACIDOPHILUS PROBIOTIC FORMULA Tabs      Take 1 tablet by mouth daily        ALPRAZolam 0.5 MG tablet    XANAX    20 tablet    Take 1 tablet (0.5 mg) by mouth 3 times daily as needed for anxiety    Generalized anxiety disorder       buPROPion 300 MG 24 hr tablet    WELLBUTRIN XL    90 tablet    Take 1 tablet (300 mg) by mouth every morning    Moderate recurrent major depression (H)       busPIRone 15 MG tablet    BUSPAR    180 tablet    Take 1 tablet (15 mg) by mouth 2 times daily    Moderate recurrent major depression (H)       clindamycin 1 % solution    CLEOCIN T    60 mL    Apply topically 2  times daily    Rosacea       diclofenac 1 % Gel topical gel    VOLTAREN    100 g    Apply 2 grams to hands four times daily using enclosed dosing card.    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 factor (H)       fish oil-omega-3 fatty acids 1000 MG capsule     90 capsule    Take 2 capsules (2 g) by mouth daily        folic acid 1 MG tablet    FOLVITE    100 tablet    Take 1 tablet (1 mg) by mouth daily    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 factor (H)       levonorgestrel-ethinyl estradiol 0.15-0.03 MG per tablet    SEASONALE    90 tablet    Take 1 tablet by mouth daily    Routine history and physical examination of adult       levothyroxine 175 MCG tablet    SYNTHROID/LEVOTHROID    90 tablet    Take 1 tablet (175 mcg) by mouth daily    Hypothyroidism, unspecified type       methotrexate sodium 2.5 MG Tabs     72 tablet    Take 15 mg by mouth once a week . Take all 6 tablets on the same day of each week.    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 factor (H)       multivitamin per tablet     100 tablet    Take 1 tablet by mouth daily.        order for DME      Equipment being ordered: CPAP 6-10 cm        sulfaSALAzine  MG EC tablet    AZULFIDINE EN    360 tablet    Take 2 tablets (1,000 mg) by mouth 2 times daily    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 factor (H)       traZODone 50 MG tablet    DESYREL    180 tablet    Take 1-2 tablets by mouth at bedtime.    Moderate recurrent major depression (H)       ZYRTEC ALLERGY 10 MG tablet   Generic drug:  cetirizine      Take 20 mg by mouth daily.

## 2017-09-01 LAB
COPATH REPORT: NORMAL
PAP: NORMAL

## 2017-09-06 LAB
FINAL DIAGNOSIS: NORMAL
HPV HR 12 DNA CVX QL NAA+PROBE: NEGATIVE
HPV16 DNA SPEC QL NAA+PROBE: NEGATIVE
HPV18 DNA SPEC QL NAA+PROBE: NEGATIVE
SPECIMEN DESCRIPTION: NORMAL

## 2017-09-20 ENCOUNTER — OFFICE VISIT (OUTPATIENT)
Dept: DERMATOLOGY | Facility: CLINIC | Age: 37
End: 2017-09-20
Payer: COMMERCIAL

## 2017-09-20 VITALS — SYSTOLIC BLOOD PRESSURE: 121 MMHG | HEART RATE: 100 BPM | DIASTOLIC BLOOD PRESSURE: 72 MMHG | OXYGEN SATURATION: 96 %

## 2017-09-20 DIAGNOSIS — L73.9 FOLLICULITIS: ICD-10-CM

## 2017-09-20 DIAGNOSIS — D18.01 CHERRY ANGIOMA: ICD-10-CM

## 2017-09-20 DIAGNOSIS — L81.2 EPHELIDES: ICD-10-CM

## 2017-09-20 DIAGNOSIS — D22.9 MULTIPLE BENIGN NEVI: ICD-10-CM

## 2017-09-20 DIAGNOSIS — L71.9 ACNE ROSACEA: Primary | ICD-10-CM

## 2017-09-20 DIAGNOSIS — L82.1 SEBORRHEIC KERATOSIS: ICD-10-CM

## 2017-09-20 PROCEDURE — 99214 OFFICE O/P EST MOD 30 MIN: CPT | Performed by: PHYSICIAN ASSISTANT

## 2017-09-20 RX ORDER — CLINDAMYCIN PHOSPHATE 10 UG/ML
LOTION TOPICAL
Qty: 60 ML | Refills: 11 | Status: SHIPPED | OUTPATIENT
Start: 2017-09-20 | End: 2020-11-16

## 2017-09-20 NOTE — NURSING NOTE
"Initial /72  Pulse 100  LMP 06/01/2017  SpO2 96% Estimated body mass index is 33 kg/(m^2) as calculated from the following:    Height as of 8/30/17: 1.778 m (5' 10\").    Weight as of 8/30/17: 104.3 kg (230 lb). .      "

## 2017-09-20 NOTE — MR AVS SNAPSHOT
After Visit Summary   9/20/2017    Vida Whitfield    MRN: [id number]           Patient Information     Date Of Birth          1980        Visit Information        Provider Department      9/20/2017 1:20 PM Sandra Shukla PA-C [facility]        Today's Diagnoses     Acne rosacea    -  1    Seborrheic keratosis        Ephelides        Cherry angioma        Multiple benign nevi        Folliculitis           Follow-ups after your visit        Your next 10 appointments already scheduled     Oct 04, 2017  2:30 PM CDT   [facility] Sleep Medicine Return with SOLO Nguyen   [facility])    [address]  [facility] [address]   [phone]            Oct 11, 2017 12:00 PM CDT   LAB with [facility] ([facility])    [address] [address]   [phone]           Patient must bring picture ID. Patient should be prepared to give a urine specimen  Please do not eat 10-12 hours before your appointment if you are coming in fasting for labs on lipids, cholesterol, or glucose (sugar). Pregnant women should follow their Care Team instructions. Water with medications is okay. Do not drink coffee or other fluids. If you have concerns about taking  your medications, please ask at office or if scheduling via goTenna, send a message by clicking on Secure Messaging, Message Your Care Team.            Eleazar 10, 2018 12:00 PM CST   LAB with [facility] [facility]   [facility]    [address] [address]   [phone]           Patient must bring picture ID. Patient should be prepared to give a urine specimen  Please do not eat 10-12 hours before your appointment if you are coming in fasting for labs on lipids, cholesterol, or glucose (sugar). Pregnant women should follow their Care Team instructions. Water with medications is okay.  Do not drink coffee or other fluids. If you have concerns about taking  your medications, please ask at office or if scheduling via REVShare, send a message by clicking on Secure Messaging, Message Your Care Team.            Jan 12, 2018 11:00 AM CST   Return Visit with Andrea Benítez MD   [facility] ([facility])    [address]  [place] [address] [phone]              Who to contact     If you have questions or need follow up information about today's clinic visit or your schedule please contact [facility] directly at [phone].  Normal or non-critical lab and imaging results will be communicated to you by MyChart, letter or phone within 4 business days after the clinic has received the results. If you do not hear from us within 7 days, please contact the clinic through Instagaragehart or phone. If you have a critical or abnormal lab result, we will notify you by phone as soon as possible.  Submit refill requests through REVShare or call your pharmacy and they will forward the refill request to us. Please allow 3 business days for your refill to be completed.          Additional Information About Your Visit        REVShare Information     REVShare gives you secure access to your electronic health record. If you see a primary care provider, you can also send messages to your care team and make appointments. If you have questions, please call your primary care clinic.  If you do not have a primary care provider, please call [phone] and they will assist you.        Care EveryWhere ID     This is your Care EveryWhere ID. This could be used by other organizations to access your [place] medical records  [id number]        Your Vitals Were     Pulse Last Period Pulse Oximetry             100 06/01/2017 96%          Blood Pressure from Last 3 Encounters:   09/20/17 121/72   08/30/17 125/69   07/14/17 106/78    Weight from Last 3 Encounters:   08/30/17 104.3 kg (230  lb)   07/14/17 103 kg (227 lb)   05/05/17 90.7 kg (200 lb)              Today, you had the following     No orders found for display         Today's Medication Changes          These changes are accurate as of: 9/20/17 11:59 PM.  If you have any questions, ask your nurse or doctor.               Start taking these medicines.        Dose/Directions    sulfacetamide sodium-sulfur 10-5 % Emul   Used for:  Acne rosacea   Started by:  Sandra Shukla PA-C        Use to wash face daily.   Quantity:  170 g   Refills:  3         These medicines have changed or have updated prescriptions.        Dose/Directions    * clindamycin 1 % solution   Commonly known as:  CLEOCIN T   This may have changed:  Another medication with the same name was added. Make sure you understand how and when to take each.   Used for:  Rosacea   Changed by:  Fco Medeiros MD        Apply topically 2 times daily   Quantity:  60 mL   Refills:  11       * clindamycin 1 % lotion   Commonly known as:  CLINDAMAX   This may have changed:  You were already taking a medication with the same name, and this prescription was added. Make sure you understand how and when to take each.   Used for:  Acne rosacea   Changed by:  Sandra Shukla PA-C        Apply twice daily on face.   Quantity:  60 mL   Refills:  11       * Notice:  This list has 2 medication(s) that are the same as other medications prescribed for you. Read the directions carefully, and ask your doctor or other care provider to review them with you.         Where to get your medicines      These medications were sent to [facility] [place], [place] - [address]  [address] [address]     Phone:  [phone]     clindamycin 1 % lotion    sulfacetamide sodium-sulfur 10-5 % Emul                Primary Care Provider Office Phone # Fax #    Fco Medeiros [phone] [address] [address]        Equal  Access to Services     [facility]: Hadii daryl arreaga lena [place], waasiada luqadaha, qaybta kaalvaleria frankyarmandovikki portillo. So [facility] [phone].    ATENCIÓN: Si maryla mahendra, tiene a gonzalez disposición servicios gratuitos de asistencia lingüística. Llame al [phone].    We comply with applicable federal civil rights laws and [place] laws. We do not discriminate on the basis of race, color, national origin, age, disability sex, sexual orientation or gender identity.            Thank you!     Thank you for choosing [facility]  for your care. Our goal is always to provide you with excellent care. Hearing back from our patients is one way we can continue to improve our services. Please take a few minutes to complete the written survey that you may receive in the mail after your visit with us. Thank you!             Your Updated Medication List - Protect others around you: Learn how to safely use, store and throw away your medicines at www.disposemymeds.org.          This list is accurate as of: 9/20/17 11:59 PM.  Always use your most recent med list.                   Brand Name Dispense Instructions for use Diagnosis    ACIDOPHILUS PROBIOTIC FORMULA Tabs      Take 1 tablet by mouth daily        ALPRAZolam 0.5 MG tablet    XANAX    20 tablet    Take 1 tablet (0.5 mg) by mouth 3 times daily as needed for anxiety    Generalized anxiety disorder       buPROPion 300 MG 24 hr tablet    WELLBUTRIN XL    90 tablet    Take 1 tablet (300 mg) by mouth every morning    Moderate recurrent major depression (H)       busPIRone 15 MG tablet    BUSPAR    180 tablet    Take 1 tablet (15 mg) by mouth 2 times daily    Moderate recurrent major depression (H)       * clindamycin 1 % solution    CLEOCIN T    60 mL    Apply topically 2 times daily    Rosacea       * clindamycin 1 % lotion    CLINDAMAX    60 mL    Apply twice daily on face.    Acne rosacea       diclofenac 1 % Gel topical gel     VOLTAREN    100 g    Apply 2 grams to hands four times daily using enclosed dosing card.    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 factor (H)       fish oil-omega-3 fatty acids 1000 MG capsule     90 capsule    Take 2 capsules (2 g) by mouth daily        folic acid 1 MG tablet    FOLVITE    100 tablet    Take 1 tablet (1 mg) by mouth daily    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 factor (H)       levonorgestrel-ethinyl estradiol 0.15-0.03 MG per tablet    SEASONALE    90 tablet    Take 1 tablet by mouth daily    Routine history and physical examination of adult       levothyroxine 175 MCG tablet    SYNTHROID/LEVOTHROID    90 tablet    Take 1 tablet (175 mcg) by mouth daily    Hypothyroidism, unspecified type       methotrexate sodium 2.5 MG Tabs     72 tablet    Take 15 mg by mouth once a week . Take all 6 tablets on the same day of each week.    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 factor (H)       multivitamin per tablet     100 tablet    Take 1 tablet by mouth daily.        order for DME      Equipment being ordered: CPAP 6-10 cm        sulfacetamide sodium-sulfur 10-5 % Emul     170 g    Use to wash face daily.    Acne rosacea       sulfaSALAzine  MG EC tablet    AZULFIDINE EN    360 tablet    Take 2 tablets (1,000 mg) by mouth 2 times daily    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 factor (H)       traZODone 50 MG tablet    DESYREL    180 tablet    Take 1-2 tablets by mouth at bedtime.    Moderate recurrent major depression (H)       ZYRTEC ALLERGY 10 MG tablet   Generic drug:  cetirizine      Take 20 mg by mouth daily.        * Notice:  This list has 2 medication(s) that are the same as other medications prescribed for you. Read the directions carefully, and ask your doctor or other care provider to review them with you.

## 2017-09-25 NOTE — PROGRESS NOTES
Vida Whitfield is a 37 year old year old female patient here today for skin check.  Patient reports that she has had a history of blistering sunburns. She has a father who has a history of nonmelanoma skin cancer. Patient reports that she does have acne rosacea and has tried metrogel in the past but this was too drying. She is interested in trying something else for her acne rosacea.  Remainder of the HPI, Meds, PMH, Allergies, FH, and SH was reviewed in chart.    Pertinent Hx:   No personal history of skin cancer.   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      Pure hypercholesterolemia        Past Surgical History:   Procedure Laterality Date     C APPENDECTOMY  2002     CHOLECYSTECTOMY, LAPOROSCOPIC  2006    Cholecystectomy, Laparoscopic     SURGICAL HISTORY OF -   17 years old    Thyroid ablation        Family History   Problem Relation Age of Onset     DIABETES Mother      Hypertension Mother      Lipids Mother      Hypertension Father      Lipids Father      Thyroid Disease Father      CANCER Father      DIABETES Maternal Grandmother      Melanoma No family hx of        Social History     Social History     Marital status:      Spouse name: N/A     Number of children: N/A     Years of education: N/A     Occupational History     Not on file.     Social History Main Topics     Smoking status: Former Smoker     Smokeless tobacco: Never Used      Comment: Quit in 2002.     Alcohol use No     Drug use: No     Sexual activity: Yes     Partners: Male     Other Topics Concern     Parent/Sibling W/ Cabg, Mi Or Angioplasty Before 65f 55m? No     Social History Narrative       Outpatient Encounter Prescriptions as of 9/20/2017   Medication Sig Dispense Refill     sulfacetamide sodium-sulfur 10-5 % EMUL Use to wash face daily. 170 g 3     clindamycin (CLINDAMAX) 1 % lotion Apply twice daily on face. 60 mL 11     levothyroxine (SYNTHROID/LEVOTHROID) 175 MCG tablet Take 1 tablet (175  mcg) by mouth daily 90 tablet 3     traZODone (DESYREL) 50 MG tablet Take 1-2 tablets by mouth at bedtime. 180 tablet 3     buPROPion (WELLBUTRIN XL) 300 MG 24 hr tablet Take 1 tablet (300 mg) by mouth every morning 90 tablet 3     busPIRone (BUSPAR) 15 MG tablet Take 1 tablet (15 mg) by mouth 2 times daily 180 tablet 3     levonorgestrel-ethinyl estradiol (SEASONALE) 0.15-0.03 MG per tablet Take 1 tablet by mouth daily 90 tablet 3     methotrexate sodium 2.5 MG TABS Take 15 mg by mouth once a week . Take all 6 tablets on the same day of each week. 72 tablet 2     sulfaSALAzine ER (AZULFIDINE EN) 500 MG EC tablet Take 2 tablets (1,000 mg) by mouth 2 times daily 360 tablet 2     folic acid (FOLVITE) 1 MG tablet Take 1 tablet (1 mg) by mouth daily 100 tablet 3     diclofenac (VOLTAREN) 1 % GEL topical gel Apply 2 grams to hands four times daily using enclosed dosing card. 100 g 3     ALPRAZolam (XANAX) 0.5 MG tablet Take 1 tablet (0.5 mg) by mouth 3 times daily as needed for anxiety 20 tablet 0     order for DME Equipment being ordered: CPAP 6-10 cm       fish oil-omega-3 fatty acids (OMEGA 3) 1000 MG capsule Take 2 capsules (2 g) by mouth daily 90 capsule      Lactobacillus (ACIDOPHILUS PROBIOTIC FORMULA) TABS Take 1 tablet by mouth daily       cetirizine (ZYRTEC ALLERGY) 10 MG tablet Take 20 mg by mouth daily.       Multiple Vitamin (MULTIVITAMIN) per tablet Take 1 tablet by mouth daily. 100 tablet 12     clindamycin (CLEOCIN T) 1 % external solution Apply topically 2 times daily (Patient not taking: Reported on 9/20/2017) 60 mL 11     No facility-administered encounter medications on file as of 9/20/2017.              Review Of Systems  Skin: As above  Eyes: negative  Ears/Nose/Throat: negative  Respiratory: No shortness of breath, dyspnea on exertion, cough, or hemoptysis  Cardiovascular: negative  Gastrointestinal: negative  Genitourinary: negative  Musculoskeletal: negative  Neurologic: negative  Psychiatric:  negative  Hematologic/Lymphatic/Immunologic: negative  Endocrine: negative      O:   NAD, WDWN, Alert & Oriented, Mood & Affect wnl, Vitals stable   Here today alone   /72  Pulse 100  LMP 06/01/2017  SpO2 96%   General appearance normal   Vitals stable   Alert, oriented and in no acute distress     Brown stuck on papules, brown macules, and red papules on torso and extremities   Telangiectasia on cheeks  Few inflammatory papules on cheeks  Rare inflammatory perifollicular papules on legs  Brown papules and macules with regular pigmetn network and borders on torso and extremities   The remainder of the detailed exam was unremarkable; the following areas were examined:  scalp/hair, conjunctiva/lids, face, neck, lips/teeth, oral mucosa/gingiva, chest, back, abdomen, buttocks, digits/nails, RUE, LUE, RLE, LLE.      Eyes: Conjunctivae/lids:Normal     ENT: Lips    MSK:Normal    Cardiovascular: peripheral edema none    Pulm: Breathing Normal    Neuro/Psych: Orientation:Normal; Mood/Affect:Normal  A/P:  1. Acne rosacea   Discussed pathophysiology.   Patient prefers to try topicals first.   Use sulfacetamide wash daily to wash face.   Apply clindamycin lotion twice daily to face.   2. Folliculitis on legs  Use bpo was or salicylic acid wash.   3. Benign nevi, ephelides, cherry angioma, seborrheic keratosis   BENIGN LESIONS DISCUSSED WITH PATIENT:  I discussed the specifics of tumor, prognosis, and genetics of benign lesions.  I explained that treatment of these lesions would be purely cosmetic and not medically neccessary.  I discussed with patient different removal options including excision, cautery and /or laser.      Nature and genetics of benign skin lesions dicussed with patient.  Signs and Symptoms of skin cancer discussed with patient.  ABCDEs of melanoma reviewed with patient.  Patient encouraged to perform monthly skin exams.  UV precautions reviewed with patient.  Skin care regimen reviewed with patient:  Eliminate harsh soaps, i.e. Dial, zest, irsih spring; Mild soaps such as Cetaphil or Dove sensitive skin, avoid hot or cold showers, aggressive use of emollients including vanicream, cetaphil or cerave discussed with patient.    Risks of non-melanoma skin cancer discussed with patient   Return to clinic one year or sooner if needed.

## 2017-10-04 ENCOUNTER — OFFICE VISIT (OUTPATIENT)
Dept: SLEEP MEDICINE | Facility: CLINIC | Age: 37
End: 2017-10-04
Payer: COMMERCIAL

## 2017-10-04 VITALS
SYSTOLIC BLOOD PRESSURE: 122 MMHG | DIASTOLIC BLOOD PRESSURE: 75 MMHG | HEART RATE: 91 BPM | OXYGEN SATURATION: 97 % | HEIGHT: 70 IN | BODY MASS INDEX: 32.93 KG/M2 | WEIGHT: 230 LBS

## 2017-10-04 DIAGNOSIS — G47.33 OSA (OBSTRUCTIVE SLEEP APNEA): Primary | ICD-10-CM

## 2017-10-04 PROCEDURE — 99213 OFFICE O/P EST LOW 20 MIN: CPT | Performed by: PHYSICIAN ASSISTANT

## 2017-10-04 NOTE — PROGRESS NOTES
Obstructive Sleep Apnea - PAP Follow-Up Visit:    Chief Complaint   Patient presents with     CPAP Follow Up     Annual CPAP follow up       Vida Whitfield comes in today for follow-up of their mild sleep apnea, managed with CPAP.     HPI: Vida Whitfield is a 37 year old female with medical history remarkable for hypothyroidism, JANET, depression, obesity and BRIDGETTE. Vida had a a home sleep study on 8/8/2014 for reported snoring, witnessed apnea and daytime sleepiness. She was diagnosed with mild BRIDGETTE (AHI 10, RDI 23, oxygen rell 85% and has been on CPAP [id number] since 2014.    Overall, the patient rates her experience with PAP as 9 (0 poor, 10 great). The mask is comfortable. The mask is not leaking.  She is not snoring with the mask on. She is not having gasp arousals. She is not having any oral or nasal dryness. The pressure settings are comfortable     She uses nasal mask.     Bedtime is typically 10 PM. Usually it takes about 15 minutes to fall asleep with the mask on. Wake time is typically 5:30 AM.  Patient is using PAP therapy 7 hours per night. The patient is usually getting 7 hours of sleep per night.    She does feel rested in the morning.    Total score - [place]asper: 2 (10/4/2017  2:37 PM)    Auto-PAP 5-20 cmH2O download: She did not bring her CPAP. She will bring it on Friday.     She needs a prescription for CPAP supplies, otherwise no other concerns.     Reviewed by team: Allergies  Meds  Problems       Reviewed by provider: Problems         Problem List:  Patient Active Problem List    Diagnosis Date Noted     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 factor (H) 03/29/2017     Priority: Medium     High risk medication use 03/29/2017     Priority: Medium     Non morbid obesity due to excess calories 08/30/2016     Priority: Medium     BRIDGETTE (obstructive sleep apnea) 08/30/2016     Priority: Medium     home sleep study on 8/8/2014 (AHI 10, RDI 23, oxygen rell 85%)        "Hypothyroidism, unspecified hypothyroidism type 12/31/2015     Priority: Medium     Rosacea 12/31/2015     Priority: Medium     Hyperlipidemia with target LDL less than 130 06/23/2014     Priority: Medium     Diagnosis updated by automated process. Provider to review and confirm.       Chronic idiopathic urticaria 05/24/2013     Priority: Medium     Allergist is Dr. Jerome in [facility].        Moderate recurrent major depression (H) 09/21/2012     Priority: Medium     Treated since age 21, 2001. Plan is to stay on the medication indefinitely.   Effexor caused decreased libido and mental changes.   Paxil and Zoloft had side effects.        Generalized anxiety disorder      Priority: Medium     Diagnosis updated by automated process. Provider to review and confirm.       CARDIOVASCULAR SCREENING; LDL GOAL LESS THAN 160 10/31/2010     Priority: Medium     Hyperhidrosis of axilla 01/09/2009     Priority: Medium     Contraceptive management 01/17/2006     Priority: Medium     Nortrel, and Tri-Sprintec: caused breakthrough bleeding and headaches.   Problem list name updated by automated process. Provider to review            /75  Pulse 91  Ht 1.778 m (5' 10\")  Wt 104.3 kg (230 lb)  SpO2 97%  BMI 33 kg/m2    Impression/Plan:    1. Mild sleep apnea-  Doing well.   Will bring CPAP for download.   Comprehensive DME.       Vida Whitfield will follow up in about 2 years, sooner if questions/concerns.     Fifteen minutes spent with patient, all of which were spent face-to-face counseling, consulting, coordinating plan of care regarding BRIDGETTE.      Diamond Taveras PA-C      "

## 2017-10-04 NOTE — NURSING NOTE
"Chief Complaint   Patient presents with     CPAP Follow Up     Annual CPAP follow up       Initial /75  Pulse 91  Ht 1.778 m (5' 10\")  Wt 104.3 kg (230 lb)  SpO2 97%  BMI 33 kg/m2 Estimated body mass index is 33 kg/(m^2) as calculated from the following:    Height as of this encounter: 1.778 m (5' 10\").    Weight as of this encounter: 104.3 kg (230 lb).  Medication Reconciliation: complete    "

## 2017-10-04 NOTE — MR AVS SNAPSHOT
After Visit Summary   10/4/2017    Vida Whitfield    MRN: [id number]           Patient Information     Date Of Birth          1980        Visit Information        Provider Department      10/4/2017 2:30 PM Diamond Taveras PA [facility]        Today's Diagnoses     BRIDGETTE (obstructive sleep apnea)    -  1      Care Instructions      Your BMI is Body mass index is 33 kg/(m^2).  Weight management is a personal decision.  If you are interested in exploring weight loss strategies, the following discussion covers the approaches that may be successful. Body mass index (BMI) is one way to tell whether you are at a healthy weight, overweight, or obese. It measures your weight in relation to your height.  A BMI of 18.5 to 24.9 is in the healthy range. A person with a BMI of 25 to 29.9 is considered overweight, and someone with a BMI of 30 or greater is considered obese. More than two-thirds of American adults are considered overweight or obese.  Being overweight or obese increases the risk for further weight gain. Excess weight may lead to heart disease and diabetes.  Creating and following plans for healthy eating and physical activity may help you improve your health.  Weight control is part of healthy lifestyle and includes exercise, emotional health, and healthy eating habits. Careful eating habits lifelong are the mainstay of weight control. Though there are significant health benefits from weight loss, long-term weight loss with diet alone may be very difficult to achieve- studies show long-term success with dietary management in less than 10% of people. Attaining a healthy weight may be especially difficult to achieve in those with severe obesity. In some cases, medications, devices and surgical management might be considered.  What can you do?  If you are overweight or obese and are interested in methods for weight loss, you should discuss this with your provider.     Consider  reducing daily calorie intake by 500 calories.     Keep a food journal.     Avoiding skipping meals, consider cutting portions instead.    Diet combined with exercise helps maintain muscle while optimizing fat loss. Strength training is particularly important for building and maintaining muscle mass. Exercise helps reduce stress, increase energy, and improves fitness. Increasing exercise without diet control, however, may not burn enough calories to loose weight.       Start walking three days a week 10-20 minutes at a time    Work towards walking thirty minutes five days a week     Eventually, increase the speed of your walking for 1-2 minutes at time    In addition, we recommend that you review healthy lifestyles and methods for weight loss available through the National Institutes of Health patient information sites:  http://win.niddk.nih.gov/publications/index.htm    And look into health and wellness programs that may be available through your health insurance provider, employer, local community center, or [employer].    Weight management plan: Patient was referred to their PCP to discuss a diet and exercise plan.      Your Body mass index is 33 kg/(m^2).  Weight management is a personal decision.  If you are interested in exploring weight loss strategies, the following discussion covers the approaches that may be successful. Body mass index (BMI) is one way to tell whether you are at a healthy weight, overweight, or obese. It measures your weight in relation to your height.  A BMI of 18.5 to 24.9 is in the healthy range. A person with a BMI of 25 to 29.9 is considered overweight, and someone with a BMI of 30 or greater is considered obese. More than two-thirds of American adults are considered overweight or obese.  Being overweight or obese increases the risk for further weight gain. Excess weight may lead to heart disease and diabetes.  Creating and following plans for healthy eating and physical activity may  help you improve your health.  Weight control is part of healthy lifestyle and includes exercise, emotional health, and healthy eating habits. Careful eating habits lifelong are the mainstay of weight control. Though there are significant health benefits from weight loss, long-term weight loss with diet alone may be very difficult to achieve- studies show long-term success with dietary management in less than 10% of people. Attaining a healthy weight may be especially difficult to achieve in those with severe obesity. In some cases, medications, devices and surgical management might be considered.  What can you do?  If you are overweight or obese and are interested in methods for weight loss, you should discuss this with your provider.     Consider reducing daily calorie intake by 500 calories.     Keep a food journal.     Avoiding skipping meals, consider cutting portions instead.    Diet combined with exercise helps maintain muscle while optimizing fat loss. Strength training is particularly important for building and maintaining muscle mass. Exercise helps reduce stress, increase energy, and improves fitness. Increasing exercise without diet control, however, may not burn enough calories to loose weight.       Start walking three days a week 10-20 minutes at a time    Work towards walking thirty minutes five days a week     Eventually, increase the speed of your walking for 1-2 minutes at time    In addition, we recommend that you review healthy lifestyles and methods for weight loss available through the National Institutes of Health patient information sites:  http://win.niddk.nih.gov/publications/index.htm    And look into health and wellness programs that may be available through your health insurance provider, employer, local community center, or [employer].    Weight management plan: Patient was referred to their PCP to discuss a diet and exercise plan.            Follow-ups after your visit         Follow-up notes from your care team     Return in 2 years (on 10/4/2019) for PAP follow up.      Your next 10 appointments already scheduled     Oct 06, 2017 11:00 AM CDT   Return Sleep Patient with SLEEP LAB, BED FIVE   [facility] ([facility] [place] [place])    [address]  [place] [address]   [phone]            Oct 11, 2017 12:00 PM CDT   LAB with [facility] ([facility])    [address] [address]   [phone]           Patient must bring picture ID. Patient should be prepared to give a urine specimen  Please do not eat 10-12 hours before your appointment if you are coming in fasting for labs on lipids, cholesterol, or glucose (sugar). Pregnant women should follow their Care Team instructions. Water with medications is okay. Do not drink coffee or other fluids. If you have concerns about taking  your medications, please ask at office or if scheduling via [facility], send a message by clicking on Secure Messaging, Message Your Care Team.            Eleazar 10, 2018 12:00 PM CST   LAB with [place] [facility]   [facility] ([facility])    [address] [address]   [phone]           Patient must bring picture ID. Patient should be prepared to give a urine specimen  Please do not eat 10-12 hours before your appointment if you are coming in fasting for labs on lipids, cholesterol, or glucose (sugar). Pregnant women should follow their Care Team instructions. Water with medications is okay. Do not drink coffee or other fluids. If you have concerns about taking  your medications, please ask at office or if scheduling via Lab42, send a message by clicking on Secure Messaging, Message Your Care Team.            Jan 12, 2018 11:00 AM CST   Return Visit with Andrea Benítez MD   [facility] ([facility])    [address] [place] [address]  "  [phone]              Who to contact     If you have questions or need follow up information about today's clinic visit or your schedule please contact [facility] directly at [phone].  Normal or non-critical lab and imaging results will be communicated to you by MyChart, letter or phone within 4 business days after the clinic has received the results. If you do not hear from us within 7 days, please contact the clinic through MyChart or phone. If you have a critical or abnormal lab result, we will notify you by phone as soon as possible.  Submit refill requests through [facility] or call your pharmacy and they will forward the refill request to us. Please allow 3 business days for your refill to be completed.          Additional Information About Your Visit        POPRAGEOUSharYushino Information     [facility] gives you secure access to your electronic health record. If you see a primary care provider, you can also send messages to your care team and make appointments. If you have questions, please call your primary care clinic.  If you do not have a primary care provider, please call [phone] and they will assist you.        Care EveryWhere ID     This is your Care EveryWhere ID. This could be used by other organizations to access your [facility] medical records  [id number]        Your Vitals Were     Pulse Height Pulse Oximetry BMI (Body Mass Index)          91 1.778 m (5' 10\") 97% 33 kg/m2         Blood Pressure from Last 3 Encounters:   10/04/17 122/75   09/20/17 121/72   08/30/17 125/69    Weight from Last 3 Encounters:   10/04/17 104.3 kg (230 lb)   08/30/17 104.3 kg (230 lb)   07/14/17 103 kg (227 lb)              We Performed the Following     Sleep Comprehensive DME          Today's Medication Changes          These changes are accurate as of: 10/4/17  2:58 PM.  If you have any questions, ask your nurse or doctor.               Stop taking these medicines if you haven't already. Please " contact your care team if you have questions.     sulfacetamide sodium-sulfur 10-5 % Emul                    Primary Care Provider Office Phone # Fax #    Fco Medeiros [phone] [address] [address]        Equal Access to Services     ZEB [place]RO[place] : Zohreh arreaga hadvao Soomaali, waaxda luqadaha, qaybta kaalmada adeegyada, vikki kleintoribiocharlene galvin. So [facility] [phone].    ATENCIÓN: Si habla español, tiene a gonzalez disposición servicios gratuitos de asistencia lingüística. Llame al [phone].    We comply with applicable federal civil rights laws and [place] laws. We do not discriminate on the basis of race, color, national origin, age, disability, sex, sexual orientation, or gender identity.            Thank you!     Thank you for choosing [facility]  for your care. Our goal is always to provide you with excellent care. Hearing back from our patients is one way we can continue to improve our services. Please take a few minutes to complete the written survey that you may receive in the mail after your visit with us. Thank you!             Your Updated Medication List - Protect others around you: Learn how to safely use, store and throw away your medicines at www.disposemymeds.org.          This list is accurate as of: 10/4/17  2:58 PM.  Always use your most recent med list.                   Brand Name Dispense Instructions for use Diagnosis    ACIDOPHILUS PROBIOTIC FORMULA Tabs      Take 1 tablet by mouth daily        ALPRAZolam 0.5 MG tablet    XANAX    20 tablet    Take 1 tablet (0.5 mg) by mouth 3 times daily as needed for anxiety    Generalized anxiety disorder       buPROPion 300 MG 24 hr tablet    WELLBUTRIN XL    90 tablet    Take 1 tablet (300 mg) by mouth every morning    Moderate recurrent major depression (H)       busPIRone 15 MG tablet    BUSPAR    180 tablet    Take 1 tablet (15 mg) by mouth 2 times daily    Moderate  recurrent major depression (H)       * clindamycin 1 % solution    CLEOCIN T    60 mL    Apply topically 2 times daily    Rosacea       * clindamycin 1 % lotion    CLINDAMAX    60 mL    Apply twice daily on face.    Acne rosacea       diclofenac 1 % Gel topical gel    VOLTAREN    100 g    Apply 2 grams to hands four times daily using enclosed dosing card.    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 factor (H)       fish oil-omega-3 fatty acids 1000 MG capsule     90 capsule    Take 2 capsules (2 g) by mouth daily        folic acid 1 MG tablet    FOLVITE    100 tablet    Take 1 tablet (1 mg) by mouth daily    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 factor (H)       levonorgestrel-ethinyl estradiol 0.15-0.03 MG per tablet    SEASONALE    90 tablet    Take 1 tablet by mouth daily    Routine history and physical examination of adult       levothyroxine 175 MCG tablet    SYNTHROID/LEVOTHROID    90 tablet    Take 1 tablet (175 mcg) by mouth daily    Hypothyroidism, unspecified type       methotrexate sodium 2.5 MG Tabs     72 tablet    Take 15 mg by mouth once a week . Take all 6 tablets on the same day of each week.    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 factor (H)       multivitamin per tablet     100 tablet    Take 1 tablet by mouth daily.        order for DME      Equipment being ordered: CPAP 6-10 cm        sulfaSALAzine  MG EC tablet    AZULFIDINE EN    360 tablet    Take 2 tablets (1,000 mg) by mouth 2 times daily    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 factor (H)       traZODone 50 MG tablet    DESYREL    180 tablet    Take 1-2 tablets by mouth at bedtime.    Moderate recurrent major depression (H)       ZYRTEC ALLERGY 10 MG tablet   Generic drug:  cetirizine      Take 20 mg by mouth daily.        * Notice:  This list has 2 medication(s) that are the same as other medications prescribed for you. Read the  directions carefully, and ask your doctor or other care provider to review them with you.

## 2017-10-04 NOTE — PATIENT INSTRUCTIONS
Your BMI is Body mass index is 33 kg/(m^2).  Weight management is a personal decision.  If you are interested in exploring weight loss strategies, the following discussion covers the approaches that may be successful. Body mass index (BMI) is one way to tell whether you are at a healthy weight, overweight, or obese. It measures your weight in relation to your height.  A BMI of 18.5 to 24.9 is in the healthy range. A person with a BMI of 25 to 29.9 is considered overweight, and someone with a BMI of 30 or greater is considered obese. More than two-thirds of American adults are considered overweight or obese.  Being overweight or obese increases the risk for further weight gain. Excess weight may lead to heart disease and diabetes.  Creating and following plans for healthy eating and physical activity may help you improve your health.  Weight control is part of healthy lifestyle and includes exercise, emotional health, and healthy eating habits. Careful eating habits lifelong are the mainstay of weight control. Though there are significant health benefits from weight loss, long-term weight loss with diet alone may be very difficult to achieve- studies show long-term success with dietary management in less than 10% of people. Attaining a healthy weight may be especially difficult to achieve in those with severe obesity. In some cases, medications, devices and surgical management might be considered.  What can you do?  If you are overweight or obese and are interested in methods for weight loss, you should discuss this with your provider.     Consider reducing daily calorie intake by 500 calories.     Keep a food journal.     Avoiding skipping meals, consider cutting portions instead.    Diet combined with exercise helps maintain muscle while optimizing fat loss. Strength training is particularly important for building and maintaining muscle mass. Exercise helps reduce stress, increase energy, and improves fitness.  Increasing exercise without diet control, however, may not burn enough calories to loose weight.       Start walking three days a week 10-20 minutes at a time    Work towards walking thirty minutes five days a week     Eventually, increase the speed of your walking for 1-2 minutes at time    In addition, we recommend that you review healthy lifestyles and methods for weight loss available through the National Institutes of Health patient information sites:  http://win.niddk.nih.gov/publications/index.htm    And look into health and wellness programs that may be available through your health insurance provider, employer, local community center, or [employer].    Weight management plan: Patient was referred to their PCP to discuss a diet and exercise plan.      Your Body mass index is 33 kg/(m^2).  Weight management is a personal decision.  If you are interested in exploring weight loss strategies, the following discussion covers the approaches that may be successful. Body mass index (BMI) is one way to tell whether you are at a healthy weight, overweight, or obese. It measures your weight in relation to your height.  A BMI of 18.5 to 24.9 is in the healthy range. A person with a BMI of 25 to 29.9 is considered overweight, and someone with a BMI of 30 or greater is considered obese. More than two-thirds of American adults are considered overweight or obese.  Being overweight or obese increases the risk for further weight gain. Excess weight may lead to heart disease and diabetes.  Creating and following plans for healthy eating and physical activity may help you improve your health.  Weight control is part of healthy lifestyle and includes exercise, emotional health, and healthy eating habits. Careful eating habits lifelong are the mainstay of weight control. Though there are significant health benefits from weight loss, long-term weight loss with diet alone may be very difficult to achieve- studies show long-term  success with dietary management in less than 10% of people. Attaining a healthy weight may be especially difficult to achieve in those with severe obesity. In some cases, medications, devices and surgical management might be considered.  What can you do?  If you are overweight or obese and are interested in methods for weight loss, you should discuss this with your provider.     Consider reducing daily calorie intake by 500 calories.     Keep a food journal.     Avoiding skipping meals, consider cutting portions instead.    Diet combined with exercise helps maintain muscle while optimizing fat loss. Strength training is particularly important for building and maintaining muscle mass. Exercise helps reduce stress, increase energy, and improves fitness. Increasing exercise without diet control, however, may not burn enough calories to loose weight.       Start walking three days a week 10-20 minutes at a time    Work towards walking thirty minutes five days a week     Eventually, increase the speed of your walking for 1-2 minutes at time    In addition, we recommend that you review healthy lifestyles and methods for weight loss available through the National Institutes of Health patient information sites:  http://win.niddk.nih.gov/publications/index.htm    And look into health and wellness programs that may be available through your health insurance provider, employer, local community center, or [employer].    Weight management plan: Patient was referred to their PCP to discuss a diet and exercise plan.

## 2017-10-06 ENCOUNTER — OFFICE VISIT (OUTPATIENT)
Dept: SLEEP MEDICINE | Facility: CLINIC | Age: 37
End: 2017-10-06

## 2017-10-06 DIAGNOSIS — G47.33 OSA (OBSTRUCTIVE SLEEP APNEA): Primary | Chronic | ICD-10-CM

## 2017-10-06 NOTE — PROGRESS NOTES
SUBJECTIVE:  Chief Complaint   Patient presents with     CPAP Follow Up     download only      OBJECTIVE:  CPAP dowload, patient saw [facility] 10/4/17 and didn't have machine, needs a download and please give copy to [facility] to review.    Vida has a [employer]    ASSESSMENT/PLAN:  Download successful.   Originals sent to scanning.   Copy to [facility] for Review.

## 2017-10-06 NOTE — MR AVS SNAPSHOT
After Visit Summary   10/6/2017    Vida Whitfield    MRN: [id number]           Patient Information     Date Of Birth          1980        Visit Information        Provider Department      10/6/2017 11:00 AM SLEEP LAB, BED FIVE [facility][note 3]        Today's Diagnoses     BRIDGETTE (obstructive sleep apnea)    -  1       Follow-ups after your visit        Your next 10 appointments already scheduled     Oct 11, 2017 12:00 PM CDT   LAB with [facility])    [address] [address]   [phone]           Patient must bring picture ID. Patient should be prepared to give a urine specimen  Please do not eat 10-12 hours before your appointment if you are coming in fasting for labs on lipids, cholesterol, or glucose (sugar). Pregnant women should follow their Care Team instructions. Water with medications is okay. Do not drink coffee or other fluids. If you have concerns about taking  your medications, please ask at office or if scheduling via [facility], send a message by clicking on Secure Messaging, Message Your Care Team.            Eleazar 10, 2018 12:00 PM CST   LAB [facility] [place] [facility]   [facility]    [address] [address]   [phone]           Patient must bring picture ID. Patient should be prepared to give a urine specimen  Please do not eat 10-12 hours before your appointment if you are coming in fasting for labs on lipids, cholesterol, or glucose (sugar). Pregnant women should follow their Care Team instructions. Water with medications is okay. Do not drink coffee or other fluids. If you have concerns about taking  your medications, please ask at office or if scheduling via [facility], send a message by clicking on Secure Messaging, Message Your Care Team.            Jan 12, 2018 11:00 AM CST   Return Visit with Andrea Benítez MD   [facility] [place] ([place]  [facility]    [address]  [place] [address] [phone]              Who to contact     If you have questions or need follow up information about today's clinic visit or your schedule please contact [facility] directly at [phone].  Normal or non-critical lab and imaging results will be communicated to you by MyChart, letter or phone within 4 business days after the clinic has received the results. If you do not hear from us within 7 days, please contact the clinic through MyChart or phone. If you have a critical or abnormal lab result, we will notify you by phone as soon as possible.  Submit refill requests through [facility] or call your pharmacy and they will forward the refill request to us. Please allow 3 business days for your refill to be completed.          Additional Information About Your Visit        MyChart Information     [facility] gives you secure access to your electronic health record. If you see a primary care provider, you can also send messages to your care team and make appointments. If you have questions, please call your primary care clinic.  If you do not have a primary care provider, please call [phone] and they will assist you.        Care EveryWhere ID     This is your Care EveryWhere ID. This could be used by other organizations to access your [facility] medical records  [id number]         Blood Pressure from Last 3 Encounters:   10/04/17 122/75   09/20/17 121/72   08/30/17 125/69    Weight from Last 3 Encounters:   10/04/17 104.3 kg (230 lb)   08/30/17 104.3 kg (230 lb)   07/14/17 103 kg (227 lb)              Today, you had the following     No orders found for display       Primary Care Provider Office Phone # Fax #    Fco Medeiros [phone] [address] [address]        Equal Access to Services     ZEB MONROE : Zohreh Hamilton, marquis goncalves, vikki sacnhez  edie wilkinsmoira thomas'aan ah. So [facility] [phone].    ATENCIÓN: Si sarahi mccray, tiene a gonzalez disposición servicios gratuitos de asistencia lingüística. Eli esteves [phone].    We comply with applicable federal civil rights laws and [place] laws. We do not discriminate on the basis of race, color, national origin, age, disability, sex, sexual orientation, or gender identity.            Thank you!     Thank you for choosing [facility]  for your care. Our goal is always to provide you with excellent care. Hearing back from our patients is one way we can continue to improve our services. Please take a few minutes to complete the written survey that you may receive in the mail after your visit with us. Thank you!             Your Updated Medication List - Protect others around you: Learn how to safely use, store and throw away your medicines at www.disposemymeds.org.          This list is accurate as of: 10/6/17 11:39 AM.  Always use your most recent med list.                   Brand Name Dispense Instructions for use Diagnosis    ACIDOPHILUS PROBIOTIC FORMULA Tabs      Take 1 tablet by mouth daily        ALPRAZolam 0.5 MG tablet    XANAX    20 tablet    Take 1 tablet (0.5 mg) by mouth 3 times daily as needed for anxiety    Generalized anxiety disorder       buPROPion 300 MG 24 hr tablet    WELLBUTRIN XL    90 tablet    Take 1 tablet (300 mg) by mouth every morning    Moderate recurrent major depression (H)       busPIRone 15 MG tablet    BUSPAR    180 tablet    Take 1 tablet (15 mg) by mouth 2 times daily    Moderate recurrent major depression (H)       * clindamycin 1 % solution    CLEOCIN T    60 mL    Apply topically 2 times daily    Rosacea       * clindamycin 1 % lotion    CLINDAMAX    60 mL    Apply twice daily on face.    Acne rosacea       diclofenac 1 % Gel topical gel    VOLTAREN    100 g    Apply 2 grams to hands four times daily using enclosed dosing card.    Rheumatoid  arthritis of multiple sites with negative rheumatoid factor (H)       fish oil-omega-3 fatty acids 1000 MG capsule     90 capsule    Take 2 capsules (2 g) by mouth daily        folic acid 1 MG tablet    FOLVITE    100 tablet    Take 1 tablet (1 mg) by mouth daily    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 factor (H)       levonorgestrel-ethinyl estradiol 0.15-0.03 MG per tablet    SEASONALE    90 tablet    Take 1 tablet by mouth daily    Routine history and physical examination of adult       levothyroxine 175 MCG tablet    SYNTHROID/LEVOTHROID    90 tablet    Take 1 tablet (175 mcg) by mouth daily    Hypothyroidism, unspecified type       methotrexate sodium 2.5 MG Tabs     72 tablet    Take 15 mg by mouth once a week . Take all 6 tablets on the same day of each week.    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 factor (H)       multivitamin per tablet     100 tablet    Take 1 tablet by mouth daily.        order for DME      Equipment being ordered: CPAP 6-10 cm        sulfaSALAzine  MG EC tablet    AZULFIDINE EN    360 tablet    Take 2 tablets (1,000 mg) by mouth 2 times daily    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 factor (H)       traZODone 50 MG tablet    DESYREL    180 tablet    Take 1-2 tablets by mouth at bedtime.    Moderate recurrent major depression (H)       ZYRTEC ALLERGY 10 MG tablet   Generic drug:  cetirizine      Take 20 mg by mouth daily.        * Notice:  This list has 2 medication(s) that are the same as other medications prescribed for you. Read the directions carefully, and ask your doctor or other care provider to review them with you.

## 2017-10-10 NOTE — PROGRESS NOTES
CPAP download(F&B ICON): Auto-CPAP 6-12 cm/H20.  Average use 7 hours 57 minutes per day.  29 total days of use. 1 nonuse days.  97 days with >4 hours use. AHI 0.9.     Compliance is excellent and CPAP appears effective.   Continue current CPAP treatment and follow up as planned.     Diamond Taveras PA-C

## 2017-10-19 DIAGNOSIS — Z00.00 ROUTINE HISTORY AND PHYSICAL EXAMINATION OF ADULT: ICD-10-CM

## 2017-10-19 DIAGNOSIS — M06.09 RHEUMATOID ARTHRITIS OF MULTIPLE SITES WITH NEGATIVE RHEUMATOID FACTOR (H): ICD-10-CM

## 2017-10-19 DIAGNOSIS — E03.9 HYPOTHYROIDISM, UNSPECIFIED TYPE: ICD-10-CM

## 2017-10-19 DIAGNOSIS — Z79.899 HIGH RISK MEDICATION USE: ICD-10-CM

## 2017-10-19 LAB
ALBUMIN SERPL-MCNC: 3.4 G/DL (ref 3.4–5)
ALP SERPL-CCNC: 46 U/L (ref 40–150)
ALT SERPL W P-5'-P-CCNC: 28 U/L (ref 0–50)
AST SERPL W P-5'-P-CCNC: 18 U/L (ref 0–45)
BASOPHILS # BLD AUTO: 0 10E9/L (ref 0–0.2)
BASOPHILS NFR BLD AUTO: 0.7 %
BILIRUB DIRECT SERPL-MCNC: <0.1 MG/DL (ref 0–0.2)
BILIRUB SERPL-MCNC: 0.2 MG/DL (ref 0.2–1.3)
CREAT SERPL-MCNC: 0.94 MG/DL (ref 0.52–1.04)
DIFFERENTIAL METHOD BLD: NORMAL
EOSINOPHIL # BLD AUTO: 0.1 10E9/L (ref 0–0.7)
EOSINOPHIL NFR BLD AUTO: 1.8 %
ERYTHROCYTE [DISTWIDTH] IN BLOOD BY AUTOMATED COUNT: 13.1 % (ref 10–15)
GFR SERPL CREATININE-BSD FRML MDRD: 67 ML/MIN/1.7M2
HCT VFR BLD AUTO: 40.1 % (ref 35–47)
HGB BLD-MCNC: 13.7 G/DL (ref 11.7–15.7)
IMM GRANULOCYTES # BLD: 0 10E9/L (ref 0–0.4)
IMM GRANULOCYTES NFR BLD: 0 %
LYMPHOCYTES # BLD AUTO: 2.2 10E9/L (ref 0.8–5.3)
LYMPHOCYTES NFR BLD AUTO: 36.5 %
MCH RBC QN AUTO: 31.9 PG (ref 26.5–33)
MCHC RBC AUTO-ENTMCNC: 34.2 G/DL (ref 31.5–36.5)
MCV RBC AUTO: 93 FL (ref 78–100)
MONOCYTES # BLD AUTO: 0.7 10E9/L (ref 0–1.3)
MONOCYTES NFR BLD AUTO: 11 %
NEUTROPHILS # BLD AUTO: 3 10E9/L (ref 1.6–8.3)
NEUTROPHILS NFR BLD AUTO: 50 %
PLATELET # BLD AUTO: 189 10E9/L (ref 150–450)
PROT SERPL-MCNC: 6.8 G/DL (ref 6.8–8.8)
RBC # BLD AUTO: 4.3 10E12/L (ref 3.8–5.2)
TSH SERPL DL<=0.005 MIU/L-ACNC: 0.83 MU/L (ref 0.4–4)
WBC # BLD AUTO: 6 10E9/L (ref 4–11)

## 2017-10-19 PROCEDURE — 85025 COMPLETE CBC W/AUTO DIFF WBC: CPT | Performed by: INTERNAL MEDICINE

## 2017-10-19 PROCEDURE — 80076 HEPATIC FUNCTION PANEL: CPT | Performed by: INTERNAL MEDICINE

## 2017-10-19 PROCEDURE — 36415 COLL VENOUS BLD VENIPUNCTURE: CPT | Performed by: INTERNAL MEDICINE

## 2017-10-19 PROCEDURE — 84443 ASSAY THYROID STIM HORMONE: CPT | Performed by: FAMILY MEDICINE

## 2017-10-19 PROCEDURE — 82565 ASSAY OF CREATININE: CPT | Performed by: INTERNAL MEDICINE

## 2018-01-17 ENCOUNTER — MYC MEDICAL ADVICE (OUTPATIENT)
Dept: SLEEP MEDICINE | Facility: CLINIC | Age: 38
End: 2018-01-17

## 2018-01-17 DIAGNOSIS — G47.33 OSA (OBSTRUCTIVE SLEEP APNEA): Primary | ICD-10-CM

## 2018-01-17 NOTE — TELEPHONE ENCOUNTER
From: Vida Whitfield  To: Diamond Taveras PA  Sent: 1/17/2018 9:08 AM CST  Subject: Question about medications    Hi, I had sent a message regarding a new cpap machine and I just spoke with [facility] and she told me I just need a prescription for a new CPAP machine sent to them and then I can get the machine. So if that could be done it would be great. Thanks.

## 2018-01-18 NOTE — TELEPHONE ENCOUNTER
Vida informed of message via telephone call.  She plans on contacting [facility] to schedule a time.

## 2018-01-18 NOTE — TELEPHONE ENCOUNTER
SUBJECTIVE:   Vida called requesting an update on this request. I see the message has been routed but I am unable to see any routing comments as they do not get saved to the notes.    OBJECTIVE:  No order has been signed for replacement equipment.     ASSESSMENT/PLAN:  Message from: Vida ANGULO Nahid to SOLO Nguyen      1/17/18 8:41 AM   Hi, I went to turn on my CPAP machine last night and it is not working.   There's no power to it at all so it won't even turn on.  So I'm guessing I need a new CPAP machine.  I'm just wondering how to go about doing this.  I need to get a new one today as I do not sleep well at all without it.  Please let me know what to do.  Thanks..    I am unsure of the current status of request. Please review and advise.

## 2018-01-19 ENCOUNTER — DOCUMENTATION ONLY (OUTPATIENT)
Dept: SLEEP MEDICINE | Facility: CLINIC | Age: 38
End: 2018-01-19

## 2018-01-19 NOTE — PROGRESS NOTES
Patient was offered choice of vendor and chose [facility].  Patient Vida Whitfield was set up at [place] on January 19, 2018. Patient received a Resmed AirSense 10 Auto. Pressures were set at 6-12 cm H2O.   Patient s ramp is 5 cm H2O for Off and FLEX/EPR is EPR, 2.  Patient received a Resmed Mask name: MIRAGE FX  Nasal mask Size For Her, Small, heated tubing and heated humidifier.  Patient is not enrolled in the STM Program and does not need to meet compliance.   Candice Silverio

## 2018-03-04 DIAGNOSIS — Z79.899 HIGH RISK MEDICATION USE: ICD-10-CM

## 2018-03-04 DIAGNOSIS — M06.09 RHEUMATOID ARTHRITIS OF MULTIPLE SITES WITH NEGATIVE RHEUMATOID FACTOR (H): ICD-10-CM

## 2018-03-04 LAB
ALBUMIN SERPL-MCNC: 3.5 G/DL (ref 3.4–5)
ALP SERPL-CCNC: 46 U/L (ref 40–150)
ALT SERPL W P-5'-P-CCNC: 30 U/L (ref 0–50)
AST SERPL W P-5'-P-CCNC: 20 U/L (ref 0–45)
BASOPHILS # BLD AUTO: 0 10E9/L (ref 0–0.2)
BASOPHILS NFR BLD AUTO: 0.7 %
BILIRUB DIRECT SERPL-MCNC: 0.2 MG/DL (ref 0–0.2)
BILIRUB SERPL-MCNC: 0.6 MG/DL (ref 0.2–1.3)
CREAT SERPL-MCNC: 0.9 MG/DL (ref 0.52–1.04)
CRP SERPL-MCNC: 9.7 MG/L (ref 0–8)
DIFFERENTIAL METHOD BLD: NORMAL
EOSINOPHIL # BLD AUTO: 0.2 10E9/L (ref 0–0.7)
EOSINOPHIL NFR BLD AUTO: 2.7 %
ERYTHROCYTE [DISTWIDTH] IN BLOOD BY AUTOMATED COUNT: 13.1 % (ref 10–15)
ERYTHROCYTE [SEDIMENTATION RATE] IN BLOOD BY WESTERGREN METHOD: 8 MM/H (ref 0–20)
GFR SERPL CREATININE-BSD FRML MDRD: 71 ML/MIN/1.7M2
HCT VFR BLD AUTO: 38.8 % (ref 35–47)
HGB BLD-MCNC: 13.4 G/DL (ref 11.7–15.7)
LYMPHOCYTES # BLD AUTO: 2.2 10E9/L (ref 0.8–5.3)
LYMPHOCYTES NFR BLD AUTO: 37.3 %
MCH RBC QN AUTO: 31.8 PG (ref 26.5–33)
MCHC RBC AUTO-ENTMCNC: 34.5 G/DL (ref 31.5–36.5)
MCV RBC AUTO: 92 FL (ref 78–100)
MONOCYTES # BLD AUTO: 0.5 10E9/L (ref 0–1.3)
MONOCYTES NFR BLD AUTO: 8.8 %
NEUTROPHILS # BLD AUTO: 2.9 10E9/L (ref 1.6–8.3)
NEUTROPHILS NFR BLD AUTO: 50.5 %
PLATELET # BLD AUTO: 194 10E9/L (ref 150–450)
PROT SERPL-MCNC: 6.9 G/DL (ref 6.8–8.8)
RBC # BLD AUTO: 4.22 10E12/L (ref 3.8–5.2)
WBC # BLD AUTO: 5.8 10E9/L (ref 4–11)

## 2018-03-04 PROCEDURE — 80076 HEPATIC FUNCTION PANEL: CPT | Performed by: FAMILY MEDICINE

## 2018-03-04 PROCEDURE — 36415 COLL VENOUS BLD VENIPUNCTURE: CPT | Performed by: FAMILY MEDICINE

## 2018-03-04 PROCEDURE — 82565 ASSAY OF CREATININE: CPT | Performed by: FAMILY MEDICINE

## 2018-03-04 PROCEDURE — 86140 C-REACTIVE PROTEIN: CPT | Performed by: FAMILY MEDICINE

## 2018-03-04 PROCEDURE — 85025 COMPLETE CBC W/AUTO DIFF WBC: CPT | Performed by: FAMILY MEDICINE

## 2018-03-04 PROCEDURE — 85652 RBC SED RATE AUTOMATED: CPT | Performed by: FAMILY MEDICINE

## 2018-03-07 ENCOUNTER — OFFICE VISIT (OUTPATIENT)
Dept: RHEUMATOLOGY | Facility: CLINIC | Age: 38
End: 2018-03-07
Payer: COMMERCIAL

## 2018-03-07 VITALS
HEIGHT: 70 IN | DIASTOLIC BLOOD PRESSURE: 76 MMHG | TEMPERATURE: 97.8 F | BODY MASS INDEX: 34.33 KG/M2 | HEART RATE: 81 BPM | WEIGHT: 239.8 LBS | OXYGEN SATURATION: 95 % | SYSTOLIC BLOOD PRESSURE: 126 MMHG

## 2018-03-07 DIAGNOSIS — M06.09 RHEUMATOID ARTHRITIS OF MULTIPLE SITES WITH NEGATIVE RHEUMATOID FACTOR (H): ICD-10-CM

## 2018-03-07 PROCEDURE — 99213 OFFICE O/P EST LOW 20 MIN: CPT | Performed by: INTERNAL MEDICINE

## 2018-03-07 RX ORDER — SULFASALAZINE 500 MG/1
1000 TABLET, DELAYED RELEASE ORAL 2 TIMES DAILY
Qty: 540 TABLET | Refills: 1 | Status: SHIPPED | OUTPATIENT
Start: 2018-03-07 | End: 2018-03-07

## 2018-03-07 RX ORDER — METHOTREXATE 2.5 MG/1
15 TABLET ORAL WEEKLY
Qty: 72 TABLET | Refills: 2 | Status: SHIPPED | OUTPATIENT
Start: 2018-03-07 | End: 2018-06-15

## 2018-03-07 RX ORDER — SULFASALAZINE 500 MG/1
1500 TABLET, DELAYED RELEASE ORAL 2 TIMES DAILY
Qty: 540 TABLET | Refills: 1 | Status: SHIPPED | OUTPATIENT
Start: 2018-03-07 | End: 2018-06-15

## 2018-03-07 RX ORDER — FOLIC ACID 1 MG/1
1 TABLET ORAL DAILY
Qty: 100 TABLET | Refills: 3 | Status: SHIPPED | OUTPATIENT
Start: 2018-03-07 | End: 2018-12-14

## 2018-03-07 NOTE — PROGRESS NOTES
Rheumatology Clinic Visit      Vida Whitfield MRN# [id number]   YOB: 1980 Age: 37 year old      Date of visit: 3/07/18   PCP: Dr. Fco Medeiros    Chief Complaint   Patient presents with:  Arthritis: RA, patient she gets little aches and pains but nothing she can't handle      Assessment and Plan     1. Seronegative nonerosive rheumatoid arthritis: Currently on methotrexate 15 mg once weekly (Cr elevation possibly secondary to MTX so MTX was reduced with improvement of Cr), folic acid 1 mg daily, and SSZ 1000mg BID. Doing fairly well today but still with inflammatory symptoms and therefore discussed increasing SSZ versus adding HCQ.  Increase SSZ.  - Continue methotrexate 15mg once weekly  - Continue folic acid 1 mg daily  - Increase sulfasalazine to 1500mg BID   - Avoid oral NSAIDs because they are historically triggers for urticaria; tolerates voltaren gel  - Labs monthly [id number]: CBC, Cr, Hepatic Panel  - Labs 2-3 days prior to next rheumatology clinic visit: CBC, creatinine, hepatic panel, ESR, CRP    # On birth control, per patient    2. Bilateral patellofemoral syndrome: Previous steroid injections on 9/20/2016 were beneficial; repeat today as documented in the procedure section.    3. Urticaria: Historically, oral NSAIDs are triggers    4. Bone health: 9/20/2016 Vitamin D level normal.     Ms. Whitfield verbalized agreement with and understanding of the rational for the diagnosis and treatment plan.  All questions were answered to best of my ability and the patient's satisfaction. Ms. Whitfield was advised to contact the clinic with any questions that may arise after the clinic visit.      Thank you for involving me in the care of the patient    Return to clinic: 3 months      HPI   Vida Whitfield is a 37 year old female with a medical history significant for anxiety, depression, chronic idiopathic urticaria, hyperlipidemia, hypothyroidism, rosacea, obstructive sleep apnea, obesity, and inflammatory  arthritis who presents for follow-up of rheumatoid arthritis.    Historical records in this paragraph: She was previously evaluated by Dr. Blaine Anthony at [facility].  2014 labs show negative: hepatitis C ab, HBV surface ag, HBV core ab, HLA-B27, Sm, RNP, SSA, SSB, Scl-70, DNA, cardiolipin, CCP, PR3, MPO. NIKKIE 1:160 homogeneous.  MPO also positive (one negative and one positive value). Per a 3/11/2016 clinic note, she has seronegative rheumatoid arthritis and chronic urticaria. She developed joint swelling and stiffness in the bilateral MCPs, MTPs, ankles, and knees that started in November 2013 shortly after tapering off prednisone that was used to manage chronic urticaria. No history of psoriasis or inflammatory bowel disease. Negative SI joint x-rays. Negative MRI of the SI joint. Flaring of her urticaria with NSAIDs. Steroid responsive in regard to her joints. Near resolution of joint stiffness and pain with methotrexate. NSAIDs are avoided because they cause flares of her urticaria.    Today, Ms. Whitfield reports that she is doing better.  Symptoms are tolerable.  Some pain in her MCPs that is worse in the AM, better with activity.  Stiffness in these joints and the knees with inactivity; improved with activity. +gelling.  Morning stiffness for about 30-45 minutes but does occur throughout the day depending on activity. Knees doing well; previous injection helped.  Delay in f/u appointment because of job change.     Denies fevers, chills, nausea, vomiting, constipation, diarrhea. No abdominal pain. No chest pain/pressure, palpitations, or shortness of breath.  No neck pain. No oral or nasal sores.    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  CV: No chest pain, pressure, palpitations, or dyspnea on exertion.  PULM: No SOB, wheeze, cough.  GI: No nausea, vomiting, constipation, diarrhea. No blood in stool. No abdominal pain.  : No blood in urine.  MSK: See HPI.  NEURO: Negative  PSYCH: Negative    Active Problem List     Patient Active Problem List   Diagnosis     Contraceptive management     Hyperhidrosis of axilla     CARDIOVASCULAR SCREENING; LDL GOAL LESS THAN 160     Generalized anxiety disorder     Moderate recurrent major depression (H)     Chronic idiopathic urticaria     Hyperlipidemia with target LDL less than 130     Hypothyroidism, unspecified hypothyroidism type     Rosacea     Non morbid obesity due to excess calories     BRIDGETTE (obstructive sleep apnea)     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 factor (H)     High risk medication use     Past Medical History     Past Medical History:   Diagnosis Date     Peripheral autonomic neuropathy in disorders classified elsewhere(337.1)      Pure hypercholesterolemia      Past Surgical History     Past Surgical History:   Procedure Laterality Date     C APPENDECTOMY  2002     CHOLECYSTECTOMY, LAPOROSCOPIC  2006    Cholecystectomy, Laparoscopic     SURGICAL HISTORY OF -   17 years old    Thyroid ablation     Allergy     Allergies   Allergen Reactions     Macrobid [Nitrofuran Derivatives]      Paxil [Paroxetine]      Anxiety         Prednisone      Vomiting      Zoloft      Mood changes     Current Medication List     Current Outpatient Prescriptions   Medication Sig     clindamycin (CLINDAMAX) 1 % lotion Apply twice daily on face.     levothyroxine (SYNTHROID/LEVOTHROID) 175 MCG tablet Take 1 tablet (175 mcg) by mouth daily     traZODone (DESYREL) 50 MG tablet Take 1-2 tablets by mouth at bedtime.     buPROPion (WELLBUTRIN XL) 300 MG 24 hr tablet Take 1 tablet (300 mg) by mouth every morning     busPIRone (BUSPAR) 15 MG tablet Take 1 tablet (15 mg) by mouth 2 times  daily     levonorgestrel-ethinyl estradiol (SEASONALE) 0.15-0.03 MG per tablet Take 1 tablet by mouth daily     methotrexate sodium 2.5 MG TABS Take 15 mg by mouth once a week . Take all 6 tablets on the same day of each week.     sulfaSALAzine ER (AZULFIDINE EN) 500 MG EC tablet Take 2 tablets (1,000 mg) by mouth 2 times daily     folic acid (FOLVITE) 1 MG tablet Take 1 tablet (1 mg) by mouth daily     diclofenac (VOLTAREN) 1 % GEL topical gel Apply 2 grams to hands four times daily using enclosed dosing card.     ALPRAZolam (XANAX) 0.5 MG tablet Take 1 tablet (0.5 mg) by mouth 3 times daily as needed for anxiety     clindamycin (CLEOCIN T) 1 % external solution Apply topically 2 times daily     fish oil-omega-3 fatty acids (OMEGA 3) 1000 MG capsule Take 2 capsules (2 g) by mouth daily     Lactobacillus (ACIDOPHILUS PROBIOTIC FORMULA) TABS Take 1 tablet by mouth daily     cetirizine (ZYRTEC ALLERGY) 10 MG tablet Take 20 mg by mouth daily.     Multiple Vitamin (MULTIVITAMIN) per tablet Take 1 tablet by mouth daily.     order for DME Equipment being ordered: CPAP 6-10 cm     No current facility-administered medications for this visit.          Social History   See HPI    Family History     Family History   Problem Relation Age of Onset     DIABETES Mother      Hypertension Mother      Lipids Mother      Hypertension Father      Lipids Father      Thyroid Disease Father      CANCER Father      DIABETES Maternal Grandmother      Melanoma No family hx of        Physical Exam     Temp Readings from Last 3 Encounters:   03/07/18 97.8  F (36.6  C) (Oral)   08/30/17 98.5  F (36.9  C) (Tympanic)   07/14/17 97.4  F (36.3  C) (Oral)     BP Readings from Last 5 Encounters:   03/07/18 126/76   10/04/17 122/75   09/20/17 121/72   08/30/17 125/69   07/14/17 106/78     Pulse Readings from Last 1 Encounters:   03/07/18 81     Resp Readings from Last 1 Encounters:   05/03/17 16     Estimated body mass index is 34.41 kg/(m^2) as  "calculated from the following:    Height as of this encounter: 1.778 m (5' 10\").    Weight as of this encounter: 108.8 kg (239 lb 12.8 oz).    GEN: NAD, overweight  HEENT: MMM.  Anicteric, noninjected sclera  CV: S1, S2. RRR. No m/r/g.  PULM: CTA bilaterally. No w/c.  MSK:  MCPs, PIPs, DIPs, wrists, elbows, shoulders, knees, ankles, and feet without swelling or tenderness to palpation. Negative MCP squeeze. Negative MTP squeeze. Hips non tender to direct palpation.   SKIN: No rash  EXT: No LE edema  PSYCH: Alert. Appropriate.    Labs / Imaging (select studies)     CBC  Recent Labs   Lab Test  03/04/18   1111  10/19/17   1156  06/21/17   1154   WBC  5.8  6.0  5.5   RBC  4.22  4.30  4.33   HGB  13.4  13.7  14.0   HCT  38.8  40.1  40.7   MCV  92  93  94   RDW  13.1  13.1  12.7   PLT  194  189  186   MCH  31.8  31.9  32.3   MCHC  34.5  34.2  34.4   NEUTROPHIL  50.5  50.0  51.4   LYMPH  37.3  36.5  34.2   MONOCYTE  8.8  11.0  9.8   EOSINOPHIL  2.7  1.8  3.5   BASOPHIL  0.7  0.7  1.1   ANEU  2.9  3.0  2.8   ALYM  2.2  2.2  1.9   FREDA  0.5  0.7  0.5   AEOS  0.2  0.1  0.2   ABAS  0.0  0.0  0.1     CMP  Recent Labs   Lab Test  03/04/18   1111  10/19/17   1156  06/21/17   1154   12/20/16   1138   09/02/16   0736  06/19/14   0850   01/26/12 2015   NA   --    --    --    --   138   --    --    --    --   140   POTASSIUM   --    --    --    --   4.4   --    --    --    --   4.0   CHLORIDE   --    --    --    --   104   --    --    --    --   104   CO2   --    --    --    --   30   --    --    --    --   26   ANIONGAP   --    --    --    --   4   --    --    --    --   10   GLC   --    --    --    --   84   --   88  91   < >  90   BUN   --    --    --    --   13   --    --    --    --   10   CR  0.90  0.94  1.00   < >  1.05*   < >   --    --    --   0.79   GFRESTIMATED  71  67  63   < >  59*   < >   --    --    --   85   GFRESTBLACK  86  81  76   < >  72   < >   --    --    --   >90   TATI   --    --    --    --   8.7   --    -- "    --    --   8.9   BILITOTAL  0.6  0.2  0.3   < >   --    < >   --    --    --   0.4   ALBUMIN  3.5  3.4  3.5   < >   --    < >   --    --    --   4.1   PROTTOTAL  6.9  6.8  6.9   < >   --    < >   --    --    --   7.5   ALKPHOS  46  46  45   < >   --    < >   --    --    --   56   AST  20  18  17   < >   --    < >   --    --    --   27   ALT  30  28  22   < >   --    < >   --    --    --   21    < > = values in this interval not displayed.     Calcium/VitaminD  Recent Labs   Lab Test  12/20/16   1138  09/20/16   1554  01/26/12 2015   TATI  8.7   --   8.9   VITDT   --   36   --      ESR/CRP  Recent Labs   Lab Test  03/04/18   1111  06/21/17   1154  03/22/17   1222   SED  8  5  5   CRP  9.7*  8.3*  4.0     Lipid Panel  Recent Labs   Lab Test  09/02/16   0736  06/19/14   0850  06/27/13   1023  06/08/11   0901   CHOL  157  159  221*  197   TRIG  70  113  154*  132   HDL  57  36*  49*  45*   LDL  86  101  141*  125   VLDL   --   23  31*  26   CHOLHDLRATIO   --   4.0  4.0  4.0   NHDL  100   --    --    --      Immunization History     Immunization History   Administered Date(s) Administered     HepB 07/22/1999     Historical DTP/aP 1980, 1980, 1980, 11/02/1982     Influenza (IIV3) PF 11/05/1996     MMR 09/07/1992     OPV, trivalent, live 1981, 1981, 1981, 11/02/1982     Pneumo Conj 13-V (2010&after) 09/20/2016     Pneumococcal 23 valent 12/20/2016     TD (ADULT, 7+) 07/14/1995, 01/17/2006     Tdap (Adacel,Boostrix) 12/10/2013          Chart documentation done in part with Dragon Voice recognition Software. Although reviewed after completion, some word and grammatical error may remain.    Andrea Benítez MD

## 2018-03-07 NOTE — NURSING NOTE
"Chief Complaint   Patient presents with     Arthritis     RA, patient she gets little aches and pains but nothing she can't handle       Initial /76 (BP Location: Left arm, Patient Position: Chair, Cuff Size: Adult Large)  Pulse 81  Temp 97.8  F (36.6  C) (Oral)  Ht 1.778 m (5' 10\")  Wt 108.8 kg (239 lb 12.8 oz)  SpO2 95%  BMI 34.41 kg/m2 Estimated body mass index is 34.41 kg/(m^2) as calculated from the following:    Height as of this encounter: 1.778 m (5' 10\").    Weight as of this encounter: 108.8 kg (239 lb 12.8 oz).  BP completed using cuff size: large         RAPID3 (0-30) Cumulative Score  4.0          RAPID3 Weighted Score (divide #4 by 3 and that is the weighted score)  1.33         "

## 2018-03-07 NOTE — MR AVS SNAPSHOT
After Visit Summary   3/7/2018    Vida Whitfield    MRN: [id number]           Patient Information     Date Of Birth          1980        Visit Information        Provider Department      3/7/2018 8:20 AM Andrea Benítez MD [facility] [place]        Today's Diagnoses     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 factor (H)           Follow-ups after your visit        Your next 10 appointments already scheduled     Heraclio 15, 2018  9:20 AM CDT   Return Visit with Andrea Benítez MD   [facility] [place] ([facility])    [address] [address]   [phone]              Future tests that were ordered for you today     Open Standing Orders        Priority Remaining Interval Expires Ordered    CBC with platelets differential Routine 2/2 Every 4 Weeks 9/3/2018 3/7/2018    Creatinine Routine 2/2 Every 4 Weeks 9/3/2018 3/7/2018    Hepatic panel Routine 2/2 Every 4 Weeks 9/3/2018 3/7/2018          Open Future Orders        Priority Expected Expires Ordered    CBC with platelets differential Routine 6/1/2018 6/20/2018 3/7/2018    Creatinine Routine 6/1/2018 6/20/2018 3/7/2018    Hepatic panel Routine 6/1/2018 6/20/2018 3/7/2018    CRP inflammation Routine 6/1/2018 6/20/2018 3/7/2018    Erythrocyte sedimentation rate auto Routine 6/1/2018 6/20/2018 3/7/2018            Who to contact     If you have questions or need follow up information about today's clinic visit or your schedule please contact [facility] [place] directly at [phone].  Normal or non-critical lab and imaging results will be communicated to you by MyChart, letter or phone within 4 business days after the clinic has received the results. If you do not hear from us within 7 days, please contact the clinic through MyChart or phone. If you have a critical or abnormal lab result, we will notify you by phone as soon as possible.  Submit refill requests through [email or web address] or  "call your pharmacy and they will forward the refill request to us. Please allow 3 business days for your refill to be completed.          Additional Information About Your Visit        MyChart Information     [employer] gives you secure access to your electronic health record. If you see a primary care provider, you can also send messages to your care team and make appointments. If you have questions, please call your primary care clinic.  If you do not have a primary care provider, please call [phone] and they will assist you.        Care EveryWhere ID     This is your Care EveryWhere ID. This could be used by other organizations to access your [facility] medical records  [id number]        Your Vitals Were     Pulse Temperature Height Pulse Oximetry BMI (Body Mass Index)       81 97.8  F (36.6  C) (Oral) 1.778 m (5' 10\") 95% 34.41 kg/m2        Blood Pressure from Last 3 Encounters:   03/07/18 126/76   10/04/17 122/75   09/20/17 121/72    Weight from Last 3 Encounters:   03/07/18 108.8 kg (239 lb 12.8 oz)   10/04/17 104.3 kg (230 lb)   08/30/17 104.3 kg (230 lb)                 Where to get your medicines      These medications were sent to [facility] [address] [place] [address]  [address] [place] [address]     Phone:  [phone]     folic acid 1 MG tablet    methotrexate sodium 2.5 MG Tabs    sulfaSALAzine  MG EC tablet          Primary Care Provider Office Phone # Fax #    Fco Medeiros [phone] [address] [facility] [address]        Equal Access to Services     ZEB MONROE AH: Hadii daryl Hamilton, marquis goncalves, qavikki maher. So [facility] [phone].    ATENCIÓN: Si habla español, tiene a gonzalez disposición servicios gratuitos de asistencia lingüística. Eli esteves [phone].    We comply with applicable federal civil rights laws and [place] laws. We do not discriminate on " the basis of race, color, national origin, age, disability, sex, sexual orientation, or gender identity.            Thank you!     Thank you for choosing [facility] [place]  for your care. Our goal is always to provide you with excellent care. Hearing back from our patients is one way we can continue to improve our services. Please take a few minutes to complete the written survey that you may receive in the mail after your visit with us. Thank you!             Your Updated Medication List - Protect others around you: Learn how to safely use, store and throw away your medicines at www.disposemymeds.org.          This list is accurate as of 3/7/18  8:37 AM.  Always use your most recent med list.                   Brand Name Dispense Instructions for use Diagnosis    ACIDOPHILUS PROBIOTIC FORMULA Tabs      Take 1 tablet by mouth daily        ALPRAZolam 0.5 MG tablet    XANAX    20 tablet    Take 1 tablet (0.5 mg) by mouth 3 times daily as needed for anxiety    Generalized anxiety disorder       buPROPion 300 MG 24 hr tablet    WELLBUTRIN XL    90 tablet    Take 1 tablet (300 mg) by mouth every morning    Moderate recurrent major depression (H)       busPIRone 15 MG tablet    BUSPAR    180 tablet    Take 1 tablet (15 mg) by mouth 2 times daily    Moderate recurrent major depression (H)       * clindamycin 1 % solution    CLEOCIN T    60 mL    Apply topically 2 times daily    Rosacea       * clindamycin 1 % lotion    CLINDAMAX    60 mL    Apply twice daily on face.    Acne rosacea       diclofenac 1 % Gel topical gel    VOLTAREN    100 g    Apply 2 grams to hands four times daily using enclosed dosing card.    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 factor (H)       fish oil-omega-3 fatty acids 1000 MG capsule     90 capsule    Take 2 capsules (2 g) by mouth daily        folic acid 1 MG tablet    FOLVITE    100 tablet    Take 1 tablet (1 mg) by mouth daily    Rheumatoid arthritis of  multiple sites with negative rheumatoid factor (H)       levonorgestrel-ethinyl estradiol 0.15-0.03 MG per tablet    SEASONALE    90 tablet    Take 1 tablet by mouth daily    Routine history and physical examination of adult       levothyroxine 175 MCG tablet    SYNTHROID/LEVOTHROID    90 tablet    Take 1 tablet (175 mcg) by mouth daily    Hypothyroidism, unspecified type       methotrexate sodium 2.5 MG Tabs     72 tablet    Take 15 mg by mouth once a week . Take all 6 tablets on the same day of each week.    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 factor (H)       multivitamin per tablet     100 tablet    Take 1 tablet by mouth daily.        order for DME      Equipment being ordered: CPAP 6-10 cm        sulfaSALAzine  MG EC tablet    AZULFIDINE EN    540 tablet    Take 2 tablets (1,000 mg) by mouth 2 times daily    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 factor (H)       traZODone 50 MG tablet    DESYREL    180 tablet    Take 1-2 tablets by mouth at bedtime.    Moderate recurrent major depression (H)       ZYRTEC ALLERGY 10 MG tablet   Generic drug:  cetirizine      Take 20 mg by mouth daily.        * Notice:  This list has 2 medication(s) that are the same as other medications prescribed for you. Read the directions carefully, and ask your doctor or other care provider to review them with you.

## 2018-05-14 DIAGNOSIS — M06.09 RHEUMATOID ARTHRITIS OF MULTIPLE SITES WITH NEGATIVE RHEUMATOID FACTOR (H): ICD-10-CM

## 2018-05-14 LAB
BASOPHILS # BLD AUTO: 0 10E9/L (ref 0–0.2)
BASOPHILS NFR BLD AUTO: 0.6 %
DIFFERENTIAL METHOD BLD: NORMAL
EOSINOPHIL # BLD AUTO: 0.2 10E9/L (ref 0–0.7)
EOSINOPHIL NFR BLD AUTO: 2.3 %
ERYTHROCYTE [DISTWIDTH] IN BLOOD BY AUTOMATED COUNT: 13.3 % (ref 10–15)
HCT VFR BLD AUTO: 37.6 % (ref 35–47)
HGB BLD-MCNC: 13 G/DL (ref 11.7–15.7)
LYMPHOCYTES # BLD AUTO: 2.5 10E9/L (ref 0.8–5.3)
LYMPHOCYTES NFR BLD AUTO: 37.3 %
MCH RBC QN AUTO: 32.2 PG (ref 26.5–33)
MCHC RBC AUTO-ENTMCNC: 34.6 G/DL (ref 31.5–36.5)
MCV RBC AUTO: 93 FL (ref 78–100)
MONOCYTES # BLD AUTO: 0.7 10E9/L (ref 0–1.3)
MONOCYTES NFR BLD AUTO: 9.8 %
NEUTROPHILS # BLD AUTO: 3.3 10E9/L (ref 1.6–8.3)
NEUTROPHILS NFR BLD AUTO: 50 %
PLATELET # BLD AUTO: 169 10E9/L (ref 150–450)
RBC # BLD AUTO: 4.04 10E12/L (ref 3.8–5.2)
WBC # BLD AUTO: 6.7 10E9/L (ref 4–11)

## 2018-05-14 PROCEDURE — 85025 COMPLETE CBC W/AUTO DIFF WBC: CPT | Performed by: INTERNAL MEDICINE

## 2018-05-14 PROCEDURE — 36415 COLL VENOUS BLD VENIPUNCTURE: CPT | Performed by: INTERNAL MEDICINE

## 2018-05-14 PROCEDURE — 82565 ASSAY OF CREATININE: CPT | Performed by: INTERNAL MEDICINE

## 2018-05-14 PROCEDURE — 80076 HEPATIC FUNCTION PANEL: CPT | Performed by: INTERNAL MEDICINE

## 2018-05-15 LAB
ALBUMIN SERPL-MCNC: 3.6 G/DL (ref 3.4–5)
ALP SERPL-CCNC: 47 U/L (ref 40–150)
ALT SERPL W P-5'-P-CCNC: 26 U/L (ref 0–50)
AST SERPL W P-5'-P-CCNC: 18 U/L (ref 0–45)
BILIRUB DIRECT SERPL-MCNC: 0.1 MG/DL (ref 0–0.2)
BILIRUB SERPL-MCNC: 0.5 MG/DL (ref 0.2–1.3)
CREAT SERPL-MCNC: 0.84 MG/DL (ref 0.52–1.04)
GFR SERPL CREATININE-BSD FRML MDRD: 76 ML/MIN/1.7M2
PROT SERPL-MCNC: 6.9 G/DL (ref 6.8–8.8)

## 2018-06-09 DIAGNOSIS — M06.09 RHEUMATOID ARTHRITIS OF MULTIPLE SITES WITH NEGATIVE RHEUMATOID FACTOR (H): ICD-10-CM

## 2018-06-09 LAB
ALBUMIN SERPL-MCNC: 3.6 G/DL (ref 3.4–5)
ALP SERPL-CCNC: 47 U/L (ref 40–150)
ALT SERPL W P-5'-P-CCNC: 34 U/L (ref 0–50)
AST SERPL W P-5'-P-CCNC: 23 U/L (ref 0–45)
BASOPHILS # BLD AUTO: 0.1 10E9/L (ref 0–0.2)
BASOPHILS NFR BLD AUTO: 1.1 %
BILIRUB DIRECT SERPL-MCNC: 0.1 MG/DL (ref 0–0.2)
BILIRUB SERPL-MCNC: 0.6 MG/DL (ref 0.2–1.3)
CREAT SERPL-MCNC: 0.94 MG/DL (ref 0.52–1.04)
CRP SERPL-MCNC: 6.1 MG/L (ref 0–8)
DIFFERENTIAL METHOD BLD: NORMAL
EOSINOPHIL # BLD AUTO: 0.2 10E9/L (ref 0–0.7)
EOSINOPHIL NFR BLD AUTO: 3 %
ERYTHROCYTE [DISTWIDTH] IN BLOOD BY AUTOMATED COUNT: 12.9 % (ref 10–15)
ERYTHROCYTE [SEDIMENTATION RATE] IN BLOOD BY WESTERGREN METHOD: 6 MM/H (ref 0–20)
GFR SERPL CREATININE-BSD FRML MDRD: 67 ML/MIN/1.7M2
HCT VFR BLD AUTO: 39.9 % (ref 35–47)
HGB BLD-MCNC: 13.6 G/DL (ref 11.7–15.7)
IMM GRANULOCYTES # BLD: 0 10E9/L (ref 0–0.4)
IMM GRANULOCYTES NFR BLD: 0 %
LYMPHOCYTES # BLD AUTO: 2.4 10E9/L (ref 0.8–5.3)
LYMPHOCYTES NFR BLD AUTO: 45.5 %
MCH RBC QN AUTO: 32 PG (ref 26.5–33)
MCHC RBC AUTO-ENTMCNC: 34.1 G/DL (ref 31.5–36.5)
MCV RBC AUTO: 94 FL (ref 78–100)
MONOCYTES # BLD AUTO: 0.5 10E9/L (ref 0–1.3)
MONOCYTES NFR BLD AUTO: 9.3 %
NEUTROPHILS # BLD AUTO: 2.2 10E9/L (ref 1.6–8.3)
NEUTROPHILS NFR BLD AUTO: 41.1 %
NRBC # BLD AUTO: 0 10*3/UL
NRBC BLD AUTO-RTO: 0 /100
PLATELET # BLD AUTO: 188 10E9/L (ref 150–450)
PROT SERPL-MCNC: 6.8 G/DL (ref 6.8–8.8)
RBC # BLD AUTO: 4.25 10E12/L (ref 3.8–5.2)
WBC # BLD AUTO: 5.3 10E9/L (ref 4–11)

## 2018-06-09 PROCEDURE — 85025 COMPLETE CBC W/AUTO DIFF WBC: CPT | Performed by: INTERNAL MEDICINE

## 2018-06-09 PROCEDURE — 36415 COLL VENOUS BLD VENIPUNCTURE: CPT | Performed by: INTERNAL MEDICINE

## 2018-06-09 PROCEDURE — 80076 HEPATIC FUNCTION PANEL: CPT | Performed by: INTERNAL MEDICINE

## 2018-06-09 PROCEDURE — 85652 RBC SED RATE AUTOMATED: CPT | Performed by: INTERNAL MEDICINE

## 2018-06-09 PROCEDURE — 86140 C-REACTIVE PROTEIN: CPT | Performed by: INTERNAL MEDICINE

## 2018-06-09 PROCEDURE — 82565 ASSAY OF CREATININE: CPT | Performed by: INTERNAL MEDICINE

## 2018-06-15 ENCOUNTER — OFFICE VISIT (OUTPATIENT)
Dept: RHEUMATOLOGY | Facility: CLINIC | Age: 38
End: 2018-06-15
Payer: COMMERCIAL

## 2018-06-15 VITALS
OXYGEN SATURATION: 97 % | HEIGHT: 70 IN | SYSTOLIC BLOOD PRESSURE: 121 MMHG | HEART RATE: 84 BPM | DIASTOLIC BLOOD PRESSURE: 75 MMHG | RESPIRATION RATE: 14 BRPM | BODY MASS INDEX: 34.73 KG/M2 | TEMPERATURE: 97.9 F | WEIGHT: 242.6 LBS

## 2018-06-15 DIAGNOSIS — M22.2X1 BILATERAL PATELLOFEMORAL SYNDROME: ICD-10-CM

## 2018-06-15 DIAGNOSIS — M22.2X2 BILATERAL PATELLOFEMORAL SYNDROME: ICD-10-CM

## 2018-06-15 DIAGNOSIS — M06.09 RHEUMATOID ARTHRITIS OF MULTIPLE SITES WITH NEGATIVE RHEUMATOID FACTOR (H): Primary | ICD-10-CM

## 2018-06-15 DIAGNOSIS — Z79.899 HIGH RISK MEDICATION USE: ICD-10-CM

## 2018-06-15 PROCEDURE — 99213 OFFICE O/P EST LOW 20 MIN: CPT | Mod: 25 | Performed by: INTERNAL MEDICINE

## 2018-06-15 PROCEDURE — 20610 DRAIN/INJ JOINT/BURSA W/O US: CPT | Mod: 50 | Performed by: INTERNAL MEDICINE

## 2018-06-15 RX ORDER — TRIAMCINOLONE ACETONIDE 40 MG/ML
40 INJECTION, SUSPENSION INTRA-ARTICULAR; INTRAMUSCULAR ONCE
Qty: 1 ML | Refills: 0 | OUTPATIENT
Start: 2018-06-15 | End: 2018-06-15

## 2018-06-15 RX ORDER — SULFASALAZINE 500 MG/1
1500 TABLET, DELAYED RELEASE ORAL 2 TIMES DAILY
Qty: 540 TABLET | Refills: 2 | Status: SHIPPED | OUTPATIENT
Start: 2018-06-15 | End: 2018-12-14

## 2018-06-15 RX ORDER — METHOTREXATE 2.5 MG/1
15 TABLET ORAL WEEKLY
Qty: 72 TABLET | Refills: 2 | Status: SHIPPED | OUTPATIENT
Start: 2018-06-15 | End: 2018-12-14

## 2018-06-15 NOTE — MR AVS SNAPSHOT
After Visit Summary   6/15/2018    Vida Whitfield    MRN: [id number]           Patient Information     Date Of Birth          1980        Visit Information        Provider Department      6/15/2018 9:20 AM Andrea Benítez MD [facility]        Today's Diagnoses     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 factor (H)    -  1    High risk medication use        Bilateral patellofemoral syndrome          Care Instructions    Rheumatology    Dr. Andrea [place] [facility]   (Monday)  [address]  [place] [address]       [facility]   (Tuesday)  [address] [place] [address]    [facility]   (Wed., Thurs., and Friday)  [address]  [place] [address]    Phone number: [phone]  Thank you for choosing [facility].  Nicolette Sarmiento CMA            Follow-ups after your visit        Your next 10 appointments already scheduled     Dec 14, 2018 11:00 AM CST   Return Visit with Andrea Benítez MD   [facility]    [address] [facility] [id number] [phone]              Who to contact     If you have questions or need follow up information about today's clinic visit or your schedule please contact [facility] directly at [phone].  Normal or non-critical lab and imaging results will be communicated to you by MyChart, letter or phone within 4 business days after the clinic has received the results. If you do not hear from us within 7 days, please contact the clinic through [employer] or phone. If you have a critical or abnormal lab result, we will notify you by phone as soon as possible.  Submit refill requests through [employer] or call your pharmacy and they will forward the refill request to us. Please allow 3 business days for your refill to be completed.          Additional Information About Your Visit        MyChart Information     [facility] gives you  "secure access to your electronic health record. If you see a primary care provider, you can also send messages to your care team and make appointments. If you have questions, please call your primary care clinic.  If you do not have a primary care provider, please call [phone] and they will assist you.        Care EveryWhere ID     This is your Care EveryWhere ID. This could be used by other organizations to access your [facility] medical records  [id number]        Your Vitals Were     Pulse Temperature Respirations Height Pulse Oximetry BMI (Body Mass Index)    84 97.9  F (36.6  C) (Oral) 14 1.778 m (5' 10\") 97% 34.81 kg/m2       Blood Pressure from Last 3 Encounters:   06/15/18 121/75   03/07/18 126/76   10/04/17 122/75    Weight from Last 3 Encounters:   06/15/18 110 kg (242 lb 9.6 oz)   03/07/18 108.8 kg (239 lb 12.8 oz)   10/04/17 104.3 kg (230 lb)              Today, you had the following     No orders found for display       Primary Care Provider Office Phone # Fax #    Fco Medeiros [phone] [address] [address]        Equal Access to Services     ZEB MONROE : Hadii aad ku hadasho Soomaali, waaxda luqadaha, qaybta kaalmada adeegyada, waxay idiin hayelviran madalyn spivey lacoby . So [facility] [phone].    ATENCIÓN: Si habla español, tiene a gonzalez disposición servicios gratuitos de asistencia lingüística. Llame al [phone].    We comply with applicable federal civil rights laws and [place] laws. We do not discriminate on the basis of race, color, national origin, age, disability, sex, sexual orientation, or gender identity.            Thank you!     Thank you for choosing [facility] [place]  for your care. Our goal is always to provide you with excellent care. Hearing back from our patients is one way we can continue to improve our services. Please take a few minutes to complete the written survey that you may receive in the mail after your visit " with us. Thank you!             Your Updated Medication List - Protect others around you: Learn how to safely use, store and throw away your medicines at www.disposemymeds.org.          This list is accurate as of 6/15/18  9:38 AM.  Always use your most recent med list.                   Brand Name Dispense Instructions for use Diagnosis    ACIDOPHILUS PROBIOTIC FORMULA Tabs      Take 1 tablet by mouth daily        ALPRAZolam 0.5 MG tablet    XANAX    20 tablet    Take 1 tablet (0.5 mg) by mouth 3 times daily as needed for anxiety    Generalized anxiety disorder       buPROPion 300 MG 24 hr tablet    WELLBUTRIN XL    90 tablet    Take 1 tablet (300 mg) by mouth every morning    Moderate recurrent major depression (H)       busPIRone 15 MG tablet    BUSPAR    180 tablet    Take 1 tablet (15 mg) by mouth 2 times daily    Moderate recurrent major depression (H)       * clindamycin 1 % solution    CLEOCIN T    60 mL    Apply topically 2 times daily    Rosacea       * clindamycin 1 % lotion    CLINDAMAX    60 mL    Apply twice daily on face.    Acne rosacea       diclofenac 1 % Gel topical gel    VOLTAREN    100 g    Apply 2 grams to hands four times daily using enclosed dosing card.    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 factor (H)       fish oil-omega-3 fatty acids 1000 MG capsule     90 capsule    Take 2 capsules (2 g) by mouth daily        folic acid 1 MG tablet    FOLVITE    100 tablet    Take 1 tablet (1 mg) by mouth daily    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 factor (H)       levonorgestrel-ethinyl estradiol 0.15-0.03 MG per tablet    SEASONALE    90 tablet    Take 1 tablet by mouth daily    Routine history and physical examination of adult       levothyroxine 175 MCG tablet    SYNTHROID/LEVOTHROID    90 tablet    Take 1 tablet (175 mcg) by mouth daily    Hypothyroidism, unspecified type       methotrexate sodium 2.5 MG Tabs     72 tablet    Take 15 mg by  mouth once a week . Take all 6 tablets on the same day of each week.    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 factor (H)       multivitamin per tablet     100 tablet    Take 1 tablet by mouth daily.        order for DME      Equipment being ordered: CPAP 6-10 cm        sulfaSALAzine  MG EC tablet    AZULFIDINE EN    540 tablet    Take 3 tablets (1,500 mg) by mouth 2 times daily    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 factor (H)       traZODone 50 MG tablet    DESYREL    180 tablet    Take 1-2 tablets by mouth at bedtime.    Moderate recurrent major depression (H)       ZYRTEC ALLERGY 10 MG tablet   Generic drug:  cetirizine      Take 20 mg by mouth daily.        * Notice:  This list has 2 medication(s) that are the same as other medications prescribed for you. Read the directions carefully, and ask your doctor or other care provider to review them with you.

## 2018-06-15 NOTE — PATIENT INSTRUCTIONS
Rheumatology    Dr. Andrea Benítez         [facility]   (Monday)  [address]  [place] [address]       [facility]   (Tuesday)  [address]  [place] [address]    [facility]   (Wed., Thurs., and Friday)  [address] [place] [address]    Phone number: [phone]  Thank you for choosing [facility].  Nicolette Sarmiento CMA

## 2018-06-15 NOTE — PROGRESS NOTES
Rheumatology Clinic Visit      Vida Whitfield MRN# [id number]   YOB: 1980 Age: 38 year old      Date of visit: 6/15/18   PCP: Dr. Fco Medeiros    Chief Complaint   Patient presents with:  Arthritis: RA, patient states her knees are bothering her, would like injections today (7/14/2017 last injections).       Assessment and Plan     1. Seronegative nonerosive rheumatoid arthritis: Currently on methotrexate 15 mg once weekly (Cr elevation possibly secondary to MTX so MTX was reduced with improvement of Cr), folic acid 1 mg daily, and SSZ 1500mg BID.  Doing well today and will maintain on his current medication regimen.    - Continue methotrexate 15mg once weekly  - Continue folic acid 1 mg daily  - Continue sulfasalazine to 1500mg BID   - Avoid oral NSAIDs because they are historically triggers for urticaria; tolerates voltaren gel  - Labs in 3 months: CBC, Creatinine, Hepatic Panel  - Labs in 6 months: CBC, Creatinine, Hepatic Panel, ESR, CRP      # On birth control per patient    2. Bilateral patellofemoral syndrome: Previous steroid injections have been effective; administered on 9/20/2016 and 7/14/2017. Symptomatic again today so will repeat injections as documented in the procedure section.     3. Urticaria: Historically, oral NSAIDs are triggers    4. Bone health: 9/20/2016 Vitamin D level normal.     Ms. Whitfield verbalized agreement with and understanding of the rational for the diagnosis and treatment plan.  All questions were answered to best of my ability and the patient's satisfaction. Ms. Whitfield was advised to contact the clinic with any questions that may arise after the clinic visit.      Thank you for involving me in the care of the patient    Return to clinic: 6 months; sooner if needed      HPI   Vida Whitfield is a 38 year old female with a medical history significant for anxiety, depression, chronic idiopathic urticaria, hyperlipidemia, hypothyroidism, rosacea, obstructive sleep apnea,  obesity, and inflammatory arthritis who presents for follow-up of rheumatoid arthritis.    Historical records in this paragraph: She was previously evaluated by Dr. Blaine Anthony at [facility].  2014 labs show negative: hepatitis C ab, HBV surface ag, HBV core ab, HLA-B27, Sm, RNP, SSA, SSB, Scl-70, DNA, cardiolipin, CCP, PR3, MPO. NIKKIE 1:160 homogeneous.  MPO also positive (one negative and one positive value). Per a 3/11/2016 clinic note, she has seronegative rheumatoid arthritis and chronic urticaria. She developed joint swelling and stiffness in the bilateral MCPs, MTPs, ankles, and knees that started in November 2013 shortly after tapering off prednisone that was used to manage chronic urticaria. No history of psoriasis or inflammatory bowel disease. Negative SI joint x-rays. Negative MRI of the SI joint. Flaring of her urticaria with NSAIDs. Steroid responsive in regard to her joints. Near resolution of joint stiffness and pain with methotrexate. NSAIDs are avoided because they cause flares of her urticaria.    Today, Ms. Whitfield reports that she is doing much better with higher dose of sulfasalazine.  Morning stiffness for no more than 30 minutes.  Positive gelling phenomenon that resolves within seconds.  No joint swelling.  Bilateral knee pain starting to worsen again, worse with more activity, especially with going up and down stairs, and standing up from the floor; the symptoms previously resolved with intra-articular steroid injections and she would like to have these repeated today.      Denies fevers, chills, nausea, vomiting, constipation, diarrhea. No abdominal pain. No chest pain/pressure, palpitations, or shortness of breath.  No neck pain. No oral or nasal sores.    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  CV: No chest pain, pressure, palpitations, or dyspnea on exertion.  PULM: No SOB, wheeze, cough.  GI: No nausea, vomiting, constipation, diarrhea. No blood in stool. No abdominal pain.  : No blood in urine.  MSK: See HPI.  NEURO: Negative  PSYCH: Negative    Active Problem List     Patient Active Problem List   Diagnosis     Contraceptive management     Hyperhidrosis of axilla     CARDIOVASCULAR SCREENING; LDL GOAL LESS THAN 160     Generalized anxiety disorder     Moderate recurrent major depression (H)     Chronic idiopathic urticaria     Hyperlipidemia with target LDL less than 130     Hypothyroidism, unspecified hypothyroidism type     Rosacea     Non morbid obesity due to excess calories     BRIDGETTE (obstructive sleep apnea)     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 factor (H)     High risk medication use     Past Medical History     Past Medical History:   Diagnosis Date     Peripheral autonomic neuropathy in disorders classified elsewhere(337.1)      Pure hypercholesterolemia      Past Surgical History     Past Surgical History:   Procedure Laterality Date     C APPENDECTOMY  2002     CHOLECYSTECTOMY, LAPOROSCOPIC  2006    Cholecystectomy, Laparoscopic     SURGICAL HISTORY OF -   17 years old    Thyroid ablation     Allergy     Allergies   Allergen Reactions     Macrobid [Nitrofuran Derivatives]      Paxil [Paroxetine]      Anxiety         Prednisone      Vomiting      Zoloft      Mood changes     Current Medication List     Current Outpatient Prescriptions   Medication Sig     ALPRAZolam (XANAX) 0.5 MG tablet Take 1 tablet (0.5 mg) by mouth 3 times daily as needed for anxiety     buPROPion (WELLBUTRIN XL) 300 MG 24 hr tablet Take 1 tablet (300 mg) by mouth every morning     busPIRone (BUSPAR) 15 MG tablet Take 1 tablet (15 mg) by mouth 2 times daily     cetirizine (ZYRTEC ALLERGY) 10 MG tablet Take 20  mg by mouth daily.     clindamycin (CLEOCIN T) 1 % external solution Apply topically 2 times daily     clindamycin (CLINDAMAX) 1 % lotion Apply twice daily on face.     diclofenac (VOLTAREN) 1 % GEL topical gel Apply 2 grams to hands four times daily using enclosed dosing card.     fish oil-omega-3 fatty acids (OMEGA 3) 1000 MG capsule Take 2 capsules (2 g) by mouth daily     folic acid (FOLVITE) 1 MG tablet Take 1 tablet (1 mg) by mouth daily     Lactobacillus (ACIDOPHILUS PROBIOTIC FORMULA) TABS Take 1 tablet by mouth daily     levonorgestrel-ethinyl estradiol (SEASONALE) 0.15-0.03 MG per tablet Take 1 tablet by mouth daily     levothyroxine (SYNTHROID/LEVOTHROID) 175 MCG tablet Take 1 tablet (175 mcg) by mouth daily     methotrexate sodium 2.5 MG TABS Take 15 mg by mouth once a week . Take all 6 tablets on the same day of each week.     Multiple Vitamin (MULTIVITAMIN) per tablet Take 1 tablet by mouth daily.     sulfaSALAzine ER (AZULFIDINE EN) 500 MG EC tablet Take 3 tablets (1,500 mg) by mouth 2 times daily     traZODone (DESYREL) 50 MG tablet Take 1-2 tablets by mouth at bedtime.     order for DME Equipment being ordered: CPAP 6-10 cm     No current facility-administered medications for this visit.          Social History   See HPI    Family History     Family History   Problem Relation Age of Onset     DIABETES Mother      Hypertension Mother      Lipids Mother      Hypertension Father      Lipids Father      Thyroid Disease Father      CANCER Father      DIABETES Maternal Grandmother      Melanoma No family hx of        Physical Exam     Temp Readings from Last 3 Encounters:   06/15/18 97.9  F (36.6  C) (Oral)   03/07/18 97.8  F (36.6  C) (Oral)   08/30/17 98.5  F (36.9  C) (Tympanic)     BP Readings from Last 5 Encounters:   06/15/18 121/75   03/07/18 126/76   10/04/17 122/75   09/20/17 121/72   08/30/17 125/69     Pulse Readings from Last 1 Encounters:   06/15/18 84     Resp Readings from Last 1  "Encounters:   06/15/18 14     Estimated body mass index is 34.81 kg/(m^2) as calculated from the following:    Height as of this encounter: 1.778 m (5' 10\").    Weight as of this encounter: 110 kg (242 lb 9.6 oz).    GEN: NAD, overweight  HEENT: MMM.  Anicteric, noninjected sclera  CV: S1, S2. RRR. No m/r/g.  PULM: CTA bilaterally. No w/c.  MSK:  MCPs, PIPs, DIPs, wrists, elbows, shoulders, ankles, and feet without swelling or tenderness to palpation. Negative MCP squeeze. Negative MTP squeeze. Hips non tender to direct palpation.  Knees with mild crepitation but no effusion or increased warmth.  SKIN: No rash  EXT: No LE edema  PSYCH: Alert. Appropriate.    Labs / Imaging (select studies)     CBC  Recent Labs   Lab Test  06/09/18   1025  05/14/18   1807  03/04/18   1111   WBC  5.3  6.7  5.8   RBC  4.25  4.04  4.22   HGB  13.6  13.0  13.4   HCT  39.9  37.6  38.8   MCV  94  93  92   RDW  12.9  13.3  13.1   PLT  188  169  194   MCH  32.0  32.2  31.8   MCHC  34.1  34.6  34.5   NEUTROPHIL  41.1  50.0  50.5   LYMPH  45.5  37.3  37.3   MONOCYTE  9.3  9.8  8.8   EOSINOPHIL  3.0  2.3  2.7   BASOPHIL  1.1  0.6  0.7   ANEU  2.2  3.3  2.9   ALYM  2.4  2.5  2.2   FREDA  0.5  0.7  0.5   AEOS  0.2  0.2  0.2   ABAS  0.1  0.0  0.0     CMP  Recent Labs   Lab Test  06/09/18   1025  05/14/18   1807  03/04/18   1111   12/20/16   1138   09/02/16   0736  06/19/14   0850   01/26/12 2015   NA   --    --    --    --   138   --    --    --    --   140   POTASSIUM   --    --    --    --   4.4   --    --    --    --   4.0   CHLORIDE   --    --    --    --   104   --    --    --    --   104   CO2   --    --    --    --   30   --    --    --    --   26   ANIONGAP   --    --    --    --   4   --    --    --    --   10   GLC   --    --    --    --   84   --   88  91   < >  90   BUN   --    --    --    --   13   --    --    --    --   10   CR  0.94  0.84  0.90   < >  1.05*   < >   --    --    --   0.79   GFRESTIMATED  67  76  71   < >  59*   < >   " --    --    --   85   GFRESTBLACK  81  >90  86   < >  72   < >   --    --    --   >90   TATI   --    --    --    --   8.7   --    --    --    --   8.9   BILITOTAL  0.6  0.5  0.6   < >   --    < >   --    --    --   0.4   ALBUMIN  3.6  3.6  3.5   < >   --    < >   --    --    --   4.1   PROTTOTAL  6.8  6.9  6.9   < >   --    < >   --    --    --   7.5   ALKPHOS  47  47  46   < >   --    < >   --    --    --   56   AST  23  18  20   < >   --    < >   --    --    --   27   ALT  34  26  30   < >   --    < >   --    --    --   21    < > = values in this interval not displayed.     Calcium/VitaminD  Recent Labs   Lab Test  12/20/16   1138  09/20/16   1554  01/26/12 2015   TATI  8.7   --   8.9   VITDT   --   36   --      ESR/CRP  Recent Labs   Lab Test  06/09/18   1025  03/04/18   1111  06/21/17   1154   SED  6  8  5   CRP  6.1  9.7*  8.3*     Immunization History     Immunization History   Administered Date(s) Administered     HepB 07/22/1999     Historical DTP/aP 1980, 1980, 1981, 11/02/1982     Influenza (IIV3) PF 11/05/1996     MMR 09/07/1992     OPV, trivalent, live 1981, 1981, 1981, 11/02/1982     Pneumo Conj 13-V (2010&after) 09/20/2016     Pneumococcal 23 valent 12/20/2016     TD (ADULT, 7+) 07/14/1995, 01/17/2006     Tdap (Adacel,Boostrix) 12/10/2013       Procedure     Procedure: Steroid injection of the bilateral knees  Indication: Pain, Patellofemoral Syndrome Bilaterally    The procedure was explained in detail. Risks including infection, pain, structural damage such as cartilage damage and tendon rupture, fat atrophy, skin hyper-/hypo-pigmentation, and medication reaction was explained. The need for rest of the affected joint for one week after the procedure was explained.  The option of not doing the procedure was also provided. All questions were answered and the patient consented to the procedure.     A time-out was performed and the correct patient, procedure, and  laterality were verified.    The right knee was examined and location for injection was identified - anterior medial. The area was cleaned with chlorhexidine, twice.  Ethyl chloride was then used for topical anaesthetic.  Then a mixture of lidocaine 1% 2 mL and Kenalog 40mg was injected into the intra-articular space.     The left knee was examined and location for injection was identified - anterior medial. The area was cleaned with chlorhexidine, twice.  Ethyl chloride was then used for topical anaesthetic.  Then a mixture of lidocaine 1% 2 mL and Kenalog 40mg was injected into the intra-articular space.     The patient tolerated the procedure well. No complications.    MEDICATION: Triamcinolone 40mg/ml  LOT #: [id number]  : [employer]  EXPIRATION DATE: 01/01/2020  NDC#: [address]-6     MEDICATION: Triamcinolone 40mg/ml  LOT #: [id number]  : [employer]iker  EXPIRATION DATE: 01/01/2020  NDC#: [address]-8     1% Lidocaine  : Hospira  Lot #: -[id number]  EXPIRATION DATE: 10/10/2019  NDC: 7127-3247-15         Chart documentation done in part with Dragon Voice recognition Software. Although reviewed after completion, some word and grammatical error may remain.    Andrea Benítez MD

## 2018-06-15 NOTE — NURSING NOTE
The following medication was given:     MEDICATION: Triamcinolone 40mg/ml  SITE: Right knee  DOSE: 1 ml  LOT #: [id number]  :  Xtellus  EXPIRATION DATE:  01/01/2020  NDC#: [address]-2     MEDICATION: Triamcinolone 40mg/ml  SITE: Left knee  DOSE: 1 ml  LOT #: [id number]  :  Xtellus  EXPIRATION DATE:  01/01/2020  NDC#: [address]-4     1% Lidocaine  : Hospira  Lot #: -[id number]  EXPIRATION DATE: 10/10/2019  NDC: 8466-8249-02

## 2018-09-07 ENCOUNTER — MYC REFILL (OUTPATIENT)
Dept: FAMILY MEDICINE | Facility: CLINIC | Age: 38
End: 2018-09-07

## 2018-09-07 DIAGNOSIS — Z00.00 ROUTINE HISTORY AND PHYSICAL EXAMINATION OF ADULT: ICD-10-CM

## 2018-09-10 RX ORDER — LEVONORGESTREL AND ETHINYL ESTRADIOL 0.15-0.03
1 KIT ORAL DAILY
Qty: 90 TABLET | Refills: 0 | Status: SHIPPED | OUTPATIENT
Start: 2018-09-10 | End: 2018-10-05

## 2018-09-10 NOTE — TELEPHONE ENCOUNTER
"Requested Prescriptions   Pending Prescriptions Disp Refills     levonorgestrel-ethinyl estradiol (SEASONALE) 0.15-0.03 MG per tablet 90 tablet 3     Sig: Take 1 tablet by mouth daily    Contraceptives Protocol Failed    9/10/2018  8:22 AM       Failed - Recent (12 mo) or future (30 days) visit within the authorizing provider's specialty    Patient had office visit in the last 12 months or has a visit in the next 30 days with authorizing provider or within the authorizing provider's specialty.  See \"Patient Info\" tab in inbasket, or \"Choose Columns\" in Meds & Orders section of the refill encounter.           Passed - Patient is not a current smoker if age is 35 or older       Passed - No active pregnancy on record       Passed - No positive pregnancy test in past 12 months        Medication is being filled for 1 time refill only due to:  Patient needs to be seen because it has been more than one year since last visit.   Sent patient mychart.     "

## 2018-09-10 NOTE — TELEPHONE ENCOUNTER
Message from [employer]:  Original authorizing provider: Fco Medeiros MD    Vida Whitfield would like a refill of the following medications:  levonorgestrel-ethinyl estradiol (SEASONALE) 0.15-0.03 MG per tablet [Fco Medeiros MD]    Preferred pharmacy: [facility], [place] - [address]    Comment:  I didn't realize that I was already due for my physical. Could I get 1 more refill so I have pills to last me till I can get in for my physical. Thank you.

## 2018-09-13 ENCOUNTER — MYC REFILL (OUTPATIENT)
Dept: FAMILY MEDICINE | Facility: CLINIC | Age: 38
End: 2018-09-13

## 2018-09-13 DIAGNOSIS — E03.9 HYPOTHYROIDISM, UNSPECIFIED TYPE: ICD-10-CM

## 2018-09-14 RX ORDER — LEVOTHYROXINE SODIUM 175 UG/1
175 TABLET ORAL DAILY
Qty: 30 TABLET | Refills: 0 | Status: SHIPPED | OUTPATIENT
Start: 2018-09-14 | End: 2018-10-05

## 2018-09-14 NOTE — TELEPHONE ENCOUNTER
Medication is being filled for 1 time refill only due to:  Patient needs to be seen because it has been more than one year since last visit.   [employer] message sent to patient.   Neena VARGHESE RN

## 2018-09-14 NOTE — TELEPHONE ENCOUNTER
"Requested Prescriptions   Pending Prescriptions Disp Refills     levothyroxine (SYNTHROID/LEVOTHROID) 175 MCG tablet  Last Written Prescription Date:  8/30/2017  Last Fill Quantity: 90,  # refills: 3   Last office visit: 8/30/2017 with prescribing provider:  Mala    Future Office Visit:     90 tablet 3     Sig: Take 1 tablet (175 mcg) by mouth daily    Thyroid Protocol Failed    9/14/2018  7:32 AM       Failed - Recent (12 mo) or future (30 days) visit within the authorizing provider's specialty    Patient had office visit in the last 12 months or has a visit in the next 30 days with authorizing provider or within the authorizing provider's specialty.  See \"Patient Info\" tab in inbasket, or \"Choose Columns\" in Meds & Orders section of the refill encounter.           Passed - Patient is 12 years or older       Passed - Normal TSH on file in past 12 months    Recent Labs   Lab Test  10/19/17   1157   TSH  0.83             Passed - No active pregnancy on record    If patient is pregnant or has had a positive pregnancy test, please check TSH.         Passed - No positive pregnancy test in past 12 months    If patient is pregnant or has had a positive pregnancy test, please check TSH.            "

## 2018-09-14 NOTE — TELEPHONE ENCOUNTER
Message from [employer]:  Original authorizing provider: MD Vida Araujo would like a refill of the following medications:  levothyroxine (SYNTHROID/LEVOTHROID) 175 MCG tablet [Fco Medeiros MD]    Preferred pharmacy: [facility] [place] [address]    Comment:

## 2018-09-17 ENCOUNTER — MYC MEDICAL ADVICE (OUTPATIENT)
Dept: FAMILY MEDICINE | Facility: CLINIC | Age: 38
End: 2018-09-17

## 2018-09-17 DIAGNOSIS — Z13.220 ENCOUNTER FOR LIPID SCREENING FOR CARDIOVASCULAR DISEASE: ICD-10-CM

## 2018-09-17 DIAGNOSIS — E03.9 HYPOTHYROIDISM, UNSPECIFIED TYPE: Primary | ICD-10-CM

## 2018-09-17 DIAGNOSIS — Z13.1 SCREENING FOR DIABETES MELLITUS: ICD-10-CM

## 2018-09-17 DIAGNOSIS — Z13.6 ENCOUNTER FOR LIPID SCREENING FOR CARDIOVASCULAR DISEASE: ICD-10-CM

## 2018-09-17 NOTE — TELEPHONE ENCOUNTER
TSH   Date Value Ref Range Status   10/19/2017 0.83 0.40 - 4.00 mU/L Final     Lab Results   Component Value Date    CR 0.94 06/09/2018     Recent Labs   Lab Test  09/02/16   0736  06/19/14   0850  06/27/13   1023   CHOL  157  159  221*   HDL  57  36*  49*   LDL  86  101  141*   TRIG  70  113  154*   CHOLHDLRATIO   --   4.0  4.0

## 2018-09-23 DIAGNOSIS — M06.09 RHEUMATOID ARTHRITIS OF MULTIPLE SITES WITH NEGATIVE RHEUMATOID FACTOR (H): ICD-10-CM

## 2018-09-23 DIAGNOSIS — E03.9 HYPOTHYROIDISM, UNSPECIFIED TYPE: ICD-10-CM

## 2018-09-23 DIAGNOSIS — Z79.899 HIGH RISK MEDICATION USE: ICD-10-CM

## 2018-09-23 DIAGNOSIS — Z13.1 SCREENING FOR DIABETES MELLITUS: ICD-10-CM

## 2018-09-23 DIAGNOSIS — Z13.6 ENCOUNTER FOR LIPID SCREENING FOR CARDIOVASCULAR DISEASE: ICD-10-CM

## 2018-09-23 DIAGNOSIS — Z13.220 ENCOUNTER FOR LIPID SCREENING FOR CARDIOVASCULAR DISEASE: ICD-10-CM

## 2018-09-23 LAB
ALBUMIN SERPL-MCNC: 3.7 G/DL (ref 3.4–5)
ALP SERPL-CCNC: 48 U/L (ref 40–150)
ALT SERPL W P-5'-P-CCNC: 26 U/L (ref 0–50)
AST SERPL W P-5'-P-CCNC: 17 U/L (ref 0–45)
BASOPHILS # BLD AUTO: 0.1 10E9/L (ref 0–0.2)
BASOPHILS NFR BLD AUTO: 0.8 %
BILIRUB DIRECT SERPL-MCNC: 0.2 MG/DL (ref 0–0.2)
BILIRUB SERPL-MCNC: 0.5 MG/DL (ref 0.2–1.3)
CHOLEST SERPL-MCNC: 166 MG/DL
CREAT SERPL-MCNC: 1.12 MG/DL (ref 0.52–1.04)
DIFFERENTIAL METHOD BLD: NORMAL
EOSINOPHIL # BLD AUTO: 0.1 10E9/L (ref 0–0.7)
EOSINOPHIL NFR BLD AUTO: 0.8 %
ERYTHROCYTE [DISTWIDTH] IN BLOOD BY AUTOMATED COUNT: 13.3 % (ref 10–15)
GFR SERPL CREATININE-BSD FRML MDRD: 55 ML/MIN/1.7M2
GLUCOSE SERPL-MCNC: 79 MG/DL (ref 70–99)
HCT VFR BLD AUTO: 40.8 % (ref 35–47)
HDLC SERPL-MCNC: 54 MG/DL
HGB BLD-MCNC: 14 G/DL (ref 11.7–15.7)
LDLC SERPL CALC-MCNC: 94 MG/DL
LYMPHOCYTES # BLD AUTO: 2.1 10E9/L (ref 0.8–5.3)
LYMPHOCYTES NFR BLD AUTO: 34 %
MCH RBC QN AUTO: 32.5 PG (ref 26.5–33)
MCHC RBC AUTO-ENTMCNC: 34.3 G/DL (ref 31.5–36.5)
MCV RBC AUTO: 95 FL (ref 78–100)
MONOCYTES # BLD AUTO: 0.8 10E9/L (ref 0–1.3)
MONOCYTES NFR BLD AUTO: 13.5 %
NEUTROPHILS # BLD AUTO: 3.1 10E9/L (ref 1.6–8.3)
NEUTROPHILS NFR BLD AUTO: 50.9 %
NONHDLC SERPL-MCNC: 112 MG/DL
PLATELET # BLD AUTO: 184 10E9/L (ref 150–450)
PROT SERPL-MCNC: 7.4 G/DL (ref 6.8–8.8)
RBC # BLD AUTO: 4.31 10E12/L (ref 3.8–5.2)
TRIGL SERPL-MCNC: 91 MG/DL
TSH SERPL DL<=0.005 MIU/L-ACNC: 2.81 MU/L (ref 0.4–4)
WBC # BLD AUTO: 6.1 10E9/L (ref 4–11)

## 2018-09-23 PROCEDURE — 80076 HEPATIC FUNCTION PANEL: CPT | Performed by: INTERNAL MEDICINE

## 2018-09-23 PROCEDURE — 82947 ASSAY GLUCOSE BLOOD QUANT: CPT | Performed by: FAMILY MEDICINE

## 2018-09-23 PROCEDURE — 80061 LIPID PANEL: CPT | Performed by: FAMILY MEDICINE

## 2018-09-23 PROCEDURE — 84443 ASSAY THYROID STIM HORMONE: CPT | Performed by: FAMILY MEDICINE

## 2018-09-23 PROCEDURE — 82565 ASSAY OF CREATININE: CPT | Performed by: INTERNAL MEDICINE

## 2018-09-23 PROCEDURE — 36415 COLL VENOUS BLD VENIPUNCTURE: CPT | Performed by: INTERNAL MEDICINE

## 2018-09-23 PROCEDURE — 85025 COMPLETE CBC W/AUTO DIFF WBC: CPT | Performed by: INTERNAL MEDICINE

## 2018-10-01 ENCOUNTER — MYC MEDICAL ADVICE (OUTPATIENT)
Dept: FAMILY MEDICINE | Facility: CLINIC | Age: 38
End: 2018-10-01

## 2018-10-01 ENCOUNTER — MYC REFILL (OUTPATIENT)
Dept: FAMILY MEDICINE | Facility: CLINIC | Age: 38
End: 2018-10-01

## 2018-10-01 DIAGNOSIS — F33.1 MODERATE RECURRENT MAJOR DEPRESSION (H): ICD-10-CM

## 2018-10-01 RX ORDER — BUSPIRONE HYDROCHLORIDE 15 MG/1
15 TABLET ORAL 2 TIMES DAILY
Qty: 60 TABLET | Refills: 0 | Status: SHIPPED | OUTPATIENT
Start: 2018-10-01 | End: 2018-10-05

## 2018-10-01 ASSESSMENT — ANXIETY QUESTIONNAIRES
1. FEELING NERVOUS, ANXIOUS, OR ON EDGE: NOT AT ALL
GAD7 TOTAL SCORE: 0
2. NOT BEING ABLE TO STOP OR CONTROL WORRYING: NOT AT ALL
4. TROUBLE RELAXING: NOT AT ALL
GAD7 TOTAL SCORE: 0
7. FEELING AFRAID AS IF SOMETHING AWFUL MIGHT HAPPEN: NOT AT ALL
GAD7 TOTAL SCORE: 0
3. WORRYING TOO MUCH ABOUT DIFFERENT THINGS: NOT AT ALL
6. BECOMING EASILY ANNOYED OR IRRITABLE: NOT AT ALL
5. BEING SO RESTLESS THAT IT IS HARD TO SIT STILL: NOT AT ALL
7. FEELING AFRAID AS IF SOMETHING AWFUL MIGHT HAPPEN: NOT AT ALL

## 2018-10-01 ASSESSMENT — PATIENT HEALTH QUESTIONNAIRE - PHQ9
SUM OF ALL RESPONSES TO PHQ QUESTIONS 1-9: 1
10. IF YOU CHECKED OFF ANY PROBLEMS, HOW DIFFICULT HAVE THESE PROBLEMS MADE IT FOR YOU TO DO YOUR WORK, TAKE CARE OF THINGS AT HOME, OR GET ALONG WITH OTHER PEOPLE: NOT DIFFICULT AT ALL
SUM OF ALL RESPONSES TO PHQ QUESTIONS 1-9: 1

## 2018-10-01 NOTE — TELEPHONE ENCOUNTER
Sent her PHQ 9 and JANET 7 via [employer].   Medication is being filled for 1 time refill only due to:  needs appt / has one scheduled for later this week.       Corinna Larson RNC

## 2018-10-01 NOTE — TELEPHONE ENCOUNTER
Message from [employer]:  Original authorizing provider: Fco Medeiros MD    Vida Whitfield would like a refill of the following medications:  busPIRone (BUSPAR) 15 MG tablet [Fco Medeiros MD]    [place] [facility] [place] [address]    Comment:  I was supposed to come in this morning for my physical so I can get my meds refilled. 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 There are no other appointments available till Friday. So I have rescheduled my appointment till Friday. Is there any way I can get a refill on my Buspirone till then? I am out now. Thank you.

## 2018-10-01 NOTE — TELEPHONE ENCOUNTER
"Requested Prescriptions   Pending Prescriptions Disp Refills     busPIRone (BUSPAR) 15 MG tablet 180 tablet 3     Sig: Take 1 tablet (15 mg) by mouth 2 times daily    Atypical Antidepressants Protocol Failed    10/1/2018  8:12 AM       Failed - Patient has PHQ-9 score less than 5 in past 6 months.    Please review last PHQ-9 score.          Passed - Patient is age 18 or older       Passed - No active pregnancy on record       Passed - No positive pregnancy test in past 12 mos       Passed - Recent (6 mo) or future (30 days) visit within the authorizing provider's specialty    Patient had office visit in the last 6 months or has a visit in the next 30 days with authorizing provider or within the authorizing provider's specialty.  See \"Patient Info\" tab in inbasket, or \"Choose Columns\" in Meds & Orders section of the refill encounter.              "

## 2018-10-01 NOTE — PROGRESS NOTES
"[employer] message sent:  \"Ms. Whitfield,    Renal function was slightly reduced on your most recent labs.  Often this is due to hydration status.  Please ensure that you are well hydrated for your next labs.    Sincerely,  Andrea Benítez MD  10/1/2018 8:02 AM\""

## 2018-10-02 ASSESSMENT — PATIENT HEALTH QUESTIONNAIRE - PHQ9: SUM OF ALL RESPONSES TO PHQ QUESTIONS 1-9: 1

## 2018-10-02 ASSESSMENT — ANXIETY QUESTIONNAIRES: GAD7 TOTAL SCORE: 0

## 2018-10-05 ENCOUNTER — OFFICE VISIT (OUTPATIENT)
Dept: FAMILY MEDICINE | Facility: CLINIC | Age: 38
End: 2018-10-05
Payer: COMMERCIAL

## 2018-10-05 VITALS
HEART RATE: 78 BPM | OXYGEN SATURATION: 98 % | BODY MASS INDEX: 35.73 KG/M2 | SYSTOLIC BLOOD PRESSURE: 122 MMHG | RESPIRATION RATE: 12 BRPM | DIASTOLIC BLOOD PRESSURE: 76 MMHG | TEMPERATURE: 97.3 F | WEIGHT: 249 LBS

## 2018-10-05 DIAGNOSIS — Z23 NEED FOR PROPHYLACTIC VACCINATION AND INOCULATION AGAINST INFLUENZA: ICD-10-CM

## 2018-10-05 DIAGNOSIS — F33.1 MODERATE RECURRENT MAJOR DEPRESSION (H): ICD-10-CM

## 2018-10-05 DIAGNOSIS — F41.1 GENERALIZED ANXIETY DISORDER: Chronic | ICD-10-CM

## 2018-10-05 DIAGNOSIS — E03.9 HYPOTHYROIDISM, UNSPECIFIED TYPE: ICD-10-CM

## 2018-10-05 DIAGNOSIS — Z00.00 ROUTINE HISTORY AND PHYSICAL EXAMINATION OF ADULT: ICD-10-CM

## 2018-10-05 DIAGNOSIS — Z11.4 SCREENING FOR HIV (HUMAN IMMUNODEFICIENCY VIRUS): ICD-10-CM

## 2018-10-05 PROCEDURE — 99395 PREV VISIT EST AGE 18-39: CPT | Performed by: FAMILY MEDICINE

## 2018-10-05 RX ORDER — LEVOTHYROXINE SODIUM 175 UG/1
175 TABLET ORAL DAILY
Qty: 90 TABLET | Refills: 3 | Status: SHIPPED | OUTPATIENT
Start: 2018-10-05 | End: 2019-10-14

## 2018-10-05 RX ORDER — LEVONORGESTREL AND ETHINYL ESTRADIOL 0.15-0.03
1 KIT ORAL DAILY
Qty: 90 TABLET | Refills: 3 | Status: SHIPPED | OUTPATIENT
Start: 2018-10-05 | End: 2019-11-07

## 2018-10-05 RX ORDER — ALPRAZOLAM 0.5 MG
0.5 TABLET ORAL 3 TIMES DAILY PRN
Qty: 20 TABLET | Refills: 0 | Status: SHIPPED | OUTPATIENT
Start: 2018-10-05 | End: 2019-11-07

## 2018-10-05 RX ORDER — BUSPIRONE HYDROCHLORIDE 15 MG/1
15 TABLET ORAL 2 TIMES DAILY
Qty: 180 TABLET | Refills: 3 | Status: SHIPPED | OUTPATIENT
Start: 2018-10-05 | End: 2019-11-07

## 2018-10-05 RX ORDER — TRAZODONE HYDROCHLORIDE 50 MG/1
TABLET, FILM COATED ORAL
Qty: 180 TABLET | Refills: 3 | Status: SHIPPED | OUTPATIENT
Start: 2018-10-05 | End: 2019-11-07

## 2018-10-05 RX ORDER — BUPROPION HYDROCHLORIDE 150 MG/1
TABLET ORAL
Qty: 90 TABLET | Refills: 11 | Status: SHIPPED | OUTPATIENT
Start: 2018-10-05 | End: 2019-10-22

## 2018-10-05 NOTE — MR AVS SNAPSHOT
After Visit Summary   10/5/2018    Vida Whitfield    MRN: [id number]           Patient Information     Date Of Birth          1980        Visit Information        Provider Department      10/5/2018 7:20 AM Fco Medeiros MD [facility]        Today's Diagnoses     Screening for HIV (human immunodeficiency virus)        Need for prophylactic vaccination and inoculation against influenza        Moderate recurrent major depression (H)        Routine history and physical examination of adult        Hypothyroidism, unspecified type        Generalized anxiety disorder          Care Instructions      Preventive Health Recommendations  Female Ages 26 - 39  Yearly exam:   See your health care provider every year in order to    Review health changes.     Discuss preventive care.      Review your medicines if you your doctor has prescribed any.    Until age 30: Get a Pap test every three years (more often if you have had an abnormal result).    After age 30: Talk to your doctor about whether you should have a Pap test every 3 years or have a Pap test with HPV screening every 5 years.   You do not need a Pap test if your uterus was removed (hysterectomy) and you have not had cancer.  You should be tested each year for STDs (sexually transmitted diseases), if you're at risk.   Talk to your provider about how often to have your cholesterol checked.  If you are at risk for diabetes, you should have a diabetes test (fasting glucose).  Shots: Get a flu shot each year. Get a tetanus shot every 10 years.   Nutrition:     Eat at least 5 servings of fruits and vegetables each day.    Eat whole-grain bread, whole-wheat pasta and brown rice instead of white grains and rice.    Get adequate Calcium and Vitamin D.     Lifestyle    Exercise at least 150 minutes a week (30 minutes a day, 5 days of the week). This will help you control your weight and prevent disease.    Limit alcohol to one drink per  "day.    No smoking.     Wear sunscreen to prevent skin cancer.    See your dentist every six months for an exam and cleaning.        ASSESSMENT/PLAN:       Routine history and physical examination of adult  COUNSELING:   Reviewed preventive health counseling, as reflected in patient instructions       Regular exercise       Healthy diet/nutrition       Vision screening       Hearing screening    BP Readings from Last 1 Encounters:   06/15/18 121/75     Estimated body mass index is 34.81 kg/(m^2) as calculated from the following:    Height as of 6/15/18: 5' 10\" (1.778 m).    Weight as of 6/15/18: 242 lb 9.6 oz (110 kg).     reports that she quit smoking about 17 years ago. Her smoking use included Cigarettes. She started smoking about 20 years ago. She has never used smokeless tobacco.  Counseling Resources:  ATP IV Guidelines  Pooled Cohorts Equation Calculator  Breast Cancer Risk Calculator  FRAX Risk Assessment  ICSI Preventive Guidelines  Dietary Guidelines for Americans, 2010  [employer]'s MyPlate  ASA Prophylaxis  Lung CA Screening  - levonorgestrel-ethinyl estradiol (SEASONALE) 0.15-0.03 MG per tablet; Take 1 tablet by mouth daily  Dispense: 90 tablet; Refill: 3     Moderate recurrent major depression (H)  We discussed the med and will increase this from 300 mg to 450 mg daily now. Use the non drug therapies.   You are doing counseling and call or return if not better. If doing well for many weeks, consider reducing the dose back to 300 mg daily.   If you do this then just call our clinic RN.   - buPROPion (WELLBUTRIN XL) 150 MG 24 hr tablet; Take 3 pills, or 450 mg, daily.  Dispense: 90 tablet; Refill: 11  - busPIRone (BUSPAR) 15 MG tablet; Take 1 tablet (15 mg) by mouth 2 times daily  Dispense: 180 tablet; Refill: 3  - traZODone (DESYREL) 50 MG tablet; Take 1-2 tablets by mouth at bedtime.  Dispense: 180 tablet; Refill: 3    Hypothyroidism, unspecified type  Stay on the med and monitor for the symptoms of high and " low thyroid. The labs were normal and are done annually.   - levothyroxine (SYNTHROID/LEVOTHROID) 175 MCG tablet; Take 1 tablet (175 mcg) by mouth daily  Dispense: 90 tablet; Refill: 3     Generalized anxiety disorder  Use as needed. The written refill is done today for #20.   - ALPRAZolam (XANAX) 0.5 MG tablet; Take 1 tablet (0.5 mg) by mouth 3 times daily as needed for anxiety  Dispense: 20 tablet; Refill: 0            Follow-ups after your visit        Your next 10 appointments already scheduled     Dec 14, 2018 11:00 AM CST   Return Visit with Andrea Benítez MD   [facility] ([place] [id number] [facility] [address] [phone]              Who to contact     If you have questions or need follow up information about today's clinic visit or your schedule please contact [facility] directly at [phone].  Normal or non-critical lab and imaging results will be communicated to you by BlueWarehart, letter or phone within 4 business days after the clinic has received the results. If you do not hear from us within 7 days, please contact the clinic through CEYX or phone. If you have a critical or abnormal lab result, we will notify you by phone as soon as possible.  Submit refill requests through CEYX or call your pharmacy and they will forward the refill request to us. Please allow 3 business days for your refill to be completed.          Additional Information About Your Visit        CEYX Information     CEYX gives you secure access to your electronic health record. If you see a primary care provider, you can also send messages to your care team and make appointments. If you have questions, please call your primary care clinic.  If you do not have a primary care provider, please call [phone] and they will assist you.        Care EveryWhere ID     This is your Care EveryWhere ID. This could be used by other organizations to access your  [facility] medical records  [id number]        Your Vitals Were     Pulse Temperature Respirations Last Period Pulse Oximetry Breastfeeding?    78 97.3  F (36.3  C) (Tympanic) 12 09/14/2018 98% No    BMI (Body Mass Index)                   35.73 kg/m2            Blood Pressure from Last 3 Encounters:   10/05/18 122/76   06/15/18 121/75   03/07/18 126/76    Weight from Last 3 Encounters:   10/05/18 249 lb (112.9 kg)   06/15/18 242 lb 9.6 oz (110 kg)   03/07/18 239 lb 12.8 oz (108.8 kg)              We Performed the Following     DEPRESSION ACTION PLAN (DAP)          Today's Medication Changes          These changes are accurate as of 10/5/18  8:15 AM.  If you have any questions, ask your nurse or doctor.               These medicines have changed or have updated prescriptions.        Dose/Directions    buPROPion 150 MG 24 hr tablet   Commonly known as:  WELLBUTRIN XL   This may have changed:    - medication strength  - how much to take  - how to take this  - when to take this  - additional instructions   Used for:  Moderate recurrent major depression (H)   Changed by:  Fco Medeiros MD        Take 3 pills, or 450 mg, daily.   Quantity:  90 tablet   Refills:  11            Where to get your medicines      These medications were sent to [facility] [address]  [address] [address] [facility] [address]     Phone:  [phone]     buPROPion 150 MG 24 hr tablet    busPIRone 15 MG tablet    levonorgestrel-ethinyl estradiol 0.15-0.03 MG per tablet    levothyroxine 175 MCG tablet    traZODone 50 MG tablet         Some of these will need a paper prescription and others can be bought over the counter.  Ask your nurse if you have questions.     Bring a paper prescription for each of these medications     ALPRAZolam 0.5 MG tablet                Primary Care Provider Office Phone # Fax #    Fco Medeiros [phone] [address] [address]         Equal Access to Services     [facility] : Hadii daryl arreaga naomiabe Loydaali, waasiada luqadaha, qaybta kashanevikki kaplan. So [facility] [phone].    ATENCIÓN: Si habla español, tiene a gonzalez disposición servicios gratuitos de asistencia lingüística. Eli al [phone].    We comply with applicable federal civil rights laws and [place] laws. We do not discriminate on the basis of race, color, national origin, age, disability, sex, sexual orientation, or gender identity.            Thank you!     Thank you for choosing [facility]  for your care. Our goal is always to provide you with excellent care. Hearing back from our patients is one way we can continue to improve our services. Please take a few minutes to complete the written survey that you may receive in the mail after your visit with us. Thank you!             Your Updated Medication List - Protect others around you: Learn how to safely use, store and throw away your medicines at www.disposemymeds.org.          This list is accurate as of 10/5/18  8:15 AM.  Always use your most recent med list.                   Brand Name Dispense Instructions for use Diagnosis    ACIDOPHILUS PROBIOTIC FORMULA Tabs      Take 1 tablet by mouth daily        ALPRAZolam 0.5 MG tablet    XANAX    20 tablet    Take 1 tablet (0.5 mg) by mouth 3 times daily as needed for anxiety    Generalized anxiety disorder       buPROPion 150 MG 24 hr tablet    WELLBUTRIN XL    90 tablet    Take 3 pills, or 450 mg, daily.    Moderate recurrent major depression (H)       busPIRone 15 MG tablet    BUSPAR    180 tablet    Take 1 tablet (15 mg) by mouth 2 times daily    Moderate recurrent major depression (H)       * clindamycin 1 % solution    CLEOCIN T    60 mL    Apply topically 2 times daily    Rosacea       * clindamycin 1 % lotion    CLINDAMAX    60 mL    Apply twice daily on face.    Acne rosacea       diclofenac 1 % Gel topical gel     VOLTAREN    100 g    Apply 2 grams to hands four times daily using enclosed dosing card.    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 factor (H)       fish oil-omega-3 fatty acids 1000 MG capsule     90 capsule    Take 2 capsules (2 g) by mouth daily        folic acid 1 MG tablet    FOLVITE    100 tablet    Take 1 tablet (1 mg) by mouth daily    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 factor (H)       levonorgestrel-ethinyl estradiol 0.15-0.03 MG per tablet    SEASONALE    90 tablet    Take 1 tablet by mouth daily    Routine history and physical examination of adult       levothyroxine 175 MCG tablet    SYNTHROID/LEVOTHROID    90 tablet    Take 1 tablet (175 mcg) by mouth daily    Hypothyroidism, unspecified type       methotrexate sodium 2.5 MG Tabs     72 tablet    Take 15 mg by mouth once a week . Take all 6 tablets on the same day of each week.    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 factor (H)       multivitamin per tablet     100 tablet    Take 1 tablet by mouth daily.        order for DME      Equipment being ordered: CPAP 6-10 cm        sulfaSALAzine  MG EC tablet    AZULFIDINE EN    540 tablet    Take 3 tablets (1,500 mg) by mouth 2 times daily    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 factor (H)       traZODone 50 MG tablet    DESYREL    180 tablet    Take 1-2 tablets by mouth at bedtime.    Moderate recurrent major depression (H)       ZYRTEC ALLERGY 10 MG tablet   Generic drug:  cetirizine      Take 20 mg by mouth daily.        * Notice:  This list has 2 medication(s) that are the same as other medications prescribed for you. Read the directions carefully, and ask your doctor or other care provider to review them with you.

## 2018-10-05 NOTE — PATIENT INSTRUCTIONS
"  Preventive Health Recommendations  Female Ages 26 - 39  Yearly exam:   See your health care provider every year in order to    Review health changes.     Discuss preventive care.      Review your medicines if you your doctor has prescribed any.    Until age 30: Get a Pap test every three years (more often if you have had an abnormal result).    After age 30: Talk to your doctor about whether you should have a Pap test every 3 years or have a Pap test with HPV screening every 5 years.   You do not need a Pap test if your uterus was removed (hysterectomy) and you have not had cancer.  You should be tested each year for STDs (sexually transmitted diseases), if you're at risk.   Talk to your provider about how often to have your cholesterol checked.  If you are at risk for diabetes, you should have a diabetes test (fasting glucose).  Shots: Get a flu shot each year. Get a tetanus shot every 10 years.   Nutrition:     Eat at least 5 servings of fruits and vegetables each day.    Eat whole-grain bread, whole-wheat pasta and brown rice instead of white grains and rice.    Get adequate Calcium and Vitamin D.     Lifestyle    Exercise at least 150 minutes a week (30 minutes a day, 5 days of the week). This will help you control your weight and prevent disease.    Limit alcohol to one drink per day.    No smoking.     Wear sunscreen to prevent skin cancer.    See your dentist every six months for an exam and cleaning.        ASSESSMENT/PLAN:       Routine history and physical examination of adult  COUNSELING:   Reviewed preventive health counseling, as reflected in patient instructions       Regular exercise       Healthy diet/nutrition       Vision screening       Hearing screening    BP Readings from Last 1 Encounters:   06/15/18 121/75     Estimated body mass index is 34.81 kg/(m^2) as calculated from the following:    Height as of 6/15/18: 5' 10\" (1.778 m).    Weight as of 6/15/18: 242 lb 9.6 oz (110 kg).     reports " that she quit smoking about 17 years ago. Her smoking use included Cigarettes. She started smoking about 20 years ago. She has never used smokeless tobacco.  Counseling Resources:  ATP IV Guidelines  Pooled Cohorts Equation Calculator  Breast Cancer Risk Calculator  FRAX Risk Assessment  ICSI Preventive Guidelines  Dietary Guidelines for Americans, 2010  USDA's MyPlate  ASA Prophylaxis  Lung CA Screening  - levonorgestrel-ethinyl estradiol (SEASONALE) 0.15-0.03 MG per tablet; Take 1 tablet by mouth daily  Dispense: 90 tablet; Refill: 3     Moderate recurrent major depression (H)  We discussed the med and will increase this from 300 mg to 450 mg daily now. Use the non drug therapies.   You are doing counseling and call or return if not better. If doing well for many weeks, consider reducing the dose back to 300 mg daily.   If you do this then just call our clinic RN.   - buPROPion (WELLBUTRIN XL) 150 MG 24 hr tablet; Take 3 pills, or 450 mg, daily.  Dispense: 90 tablet; Refill: 11  - busPIRone (BUSPAR) 15 MG tablet; Take 1 tablet (15 mg) by mouth 2 times daily  Dispense: 180 tablet; Refill: 3  - traZODone (DESYREL) 50 MG tablet; Take 1-2 tablets by mouth at bedtime.  Dispense: 180 tablet; Refill: 3    Hypothyroidism, unspecified type  Stay on the med and monitor for the symptoms of high and low thyroid. The labs were normal and are done annually.   - levothyroxine (SYNTHROID/LEVOTHROID) 175 MCG tablet; Take 1 tablet (175 mcg) by mouth daily  Dispense: 90 tablet; Refill: 3     Generalized anxiety disorder  Use as needed. The written refill is done today for #20.   - ALPRAZolam (XANAX) 0.5 MG tablet; Take 1 tablet (0.5 mg) by mouth 3 times daily as needed for anxiety  Dispense: 20 tablet; Refill: 0

## 2018-10-05 NOTE — PROGRESS NOTES
SUBJECTIVE:   CC: Vida Whitfield is an 38 year old woman who presents for preventive health visit.     Healthy Habits:    Do you get at least three servings of calcium containing foods daily (dairy, green leafy vegetables, etc.)? yes    Amount of exercise or daily activities, outside of work: 2 day(s) per week    Problems taking medications regularly No    Medication side effects: No    Have you had an eye exam in the past two years? yes    Do you see a dentist twice per year? yes    Do you have sleep apnea, excessive snoring or daytime drowsiness?no      Current Outpatient Prescriptions:      ALPRAZolam (XANAX) 0.5 MG tablet, Take 1 tablet (0.5 mg) by mouth 3 times daily as needed for anxiety, Disp: 20 tablet, Rfl: 0     buPROPion (WELLBUTRIN XL) 300 MG 24 hr tablet, Take 1 tablet (300 mg) by mouth every morning, Disp: 90 tablet, Rfl: 3     busPIRone (BUSPAR) 15 MG tablet, Take 1 tablet (15 mg) by mouth 2 times daily, Disp: 60 tablet, Rfl: 0     cetirizine (ZYRTEC ALLERGY) 10 MG tablet, Take 20 mg by mouth daily., Disp: , Rfl:      clindamycin (CLEOCIN T) 1 % external solution, Apply topically 2 times daily, Disp: 60 mL, Rfl: 11     clindamycin (CLINDAMAX) 1 % lotion, Apply twice daily on face., Disp: 60 mL, Rfl: 11     diclofenac (VOLTAREN) 1 % GEL topical gel, Apply 2 grams to hands four times daily using enclosed dosing card., Disp: 100 g, Rfl: 3     fish oil-omega-3 fatty acids (OMEGA 3) 1000 MG capsule, Take 2 capsules (2 g) by mouth daily, Disp: 90 capsule, Rfl:      folic acid (FOLVITE) 1 MG tablet, Take 1 tablet (1 mg) by mouth daily, Disp: 100 tablet, Rfl: 3     Lactobacillus (ACIDOPHILUS PROBIOTIC FORMULA) TABS, Take 1 tablet by mouth daily, Disp: , Rfl:      levonorgestrel-ethinyl estradiol (SEASONALE) 0.15-0.03 MG per tablet, Take 1 tablet by mouth daily, Disp: 90 tablet, Rfl: 0     levothyroxine (SYNTHROID/LEVOTHROID) 175 MCG tablet, Take 1 tablet (175 mcg) by mouth daily, Disp: 30 tablet, Rfl:  0     methotrexate sodium 2.5 MG TABS, Take 15 mg by mouth once a week . Take all 6 tablets on the same day of each week., Disp: 72 tablet, Rfl: 2     Multiple Vitamin (MULTIVITAMIN) per tablet, Take 1 tablet by mouth daily., Disp: 100 tablet, Rfl: 12     order for DME, Equipment being ordered: CPAP 6-10 cm, Disp: , Rfl:      sulfaSALAzine ER (AZULFIDINE EN) 500 MG EC tablet, Take 3 tablets (1,500 mg) by mouth 2 times daily, Disp: 540 tablet, Rfl: 2     traZODone (DESYREL) 50 MG tablet, Take 1-2 tablets by mouth at bedtime., Disp: 180 tablet, Rfl: 3    Patient Active Problem List   Diagnosis     Contraceptive management     Hyperhidrosis of axilla     CARDIOVASCULAR SCREENING; LDL GOAL LESS THAN 160     Generalized anxiety disorder     Moderate recurrent major depression (H)     Chronic idiopathic urticaria     Hyperlipidemia with target LDL less than 130     Hypothyroidism, unspecified hypothyroidism type     Rosacea     Non morbid obesity due to excess calories     BRIDGETTE (obstructive sleep apnea)     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 factor (H)     High risk medication use       Today's PHQ-2 Score: 0  PHQ-2 ( 1999 [employer]) 9/24/2018 8/28/2017   Q1: Little interest or pleasure in doing things 1 2   Q2: Feeling down, depressed or hopeless 1 2   PHQ-2 Score 2 4   Q1: Little interest or pleasure in doing things Several days More than half the days   Q2: Feeling down, depressed or hopeless Several days More than half the days   PHQ-2 Score 2 4       Abuse: Current or Past(Physical, Sexual or Emotional)- No  Do you feel safe in your environment - Yes    Social History   Substance Use Topics     Smoking status: Former Smoker     Types: Cigarettes     Start date: 6/1/1998     Quit date: 6/1/2001     Smokeless tobacco: Never Used      Comment: not a smoker     Alcohol use Yes      Comment: rare     If you drink alcohol do you typically have >3 drinks per day or >7 drinks per week? No                      Reviewed orders with patient.  Reviewed health maintenance and updated orders accordingly -       Mammograms will start at 40.   Pap next is in 2020.     Pertinent mammograms are reviewed under the imaging tab.  History of abnormal Pap smear:   PAP / HPV Latest Ref Rng & Units 8/30/2017 6/19/2014 6/18/2012   PAP - NIL NIL NIL   HPV 16 DNA NEG:Negative Negative - -   HPV 18 DNA NEG:Negative Negative - -   OTHER HR HPV NEG:Negative Negative - -     Reviewed and updated as needed this visit by clinical staff  Tobacco  Allergies  Meds  Med Hx  Surg Hx  Fam Hx  Soc Hx        Reviewed and updated as needed this visit by Provider      ROS:  CONSTITUTIONAL: NEGATIVE for fever, chills, change in weight  INTEGUMENTARU/SKIN: NEGATIVE for worrisome rashes, moles or lesions  EYES: NEGATIVE for vision changes or irritation  ENT: NEGATIVE for ear, mouth and throat problems  RESP: NEGATIVE for significant cough or SOB  BREAST: NEGATIVE for masses, tenderness or discharge  CV: NEGATIVE for chest pain, palpitations or peripheral edema  GI: NEGATIVE for nausea, abdominal pain, heartburn, or change in bowel habits  : NEGATIVE for unusual urinary or vaginal symptoms. Periods are regular.  MUSCULOSKELETAL: NEGATIVE for significant arthralgias or myalgia  NEURO: NEGATIVE for weakness, dizziness or paresthesias  PSYCHIATRIC: NEGATIVE for changes in mood or affect    OBJECTIVE:   /76  Pulse 78  Temp 97.3  F (36.3  C) (Tympanic)  Resp 12  Wt 249 lb (112.9 kg)  LMP 09/14/2018  SpO2 98%  Breastfeeding? No  BMI 35.73 kg/m2  EXAM:  GENERAL APPEARANCE: healthy, alert and no distress  EYES: EOMI,  PERRL  HENT: ear canals and TM's normal and nose and mouth without ulcers or lesions  NECK: no adenopathy, no asymmetry, masses, or scars and thyroid normal to palpation  RESP: lungs clear to auscultation - no rales, rhonchi or wheezes  CV: regular rates and rhythm, normal S1 S2, no S3 or S4 and no murmur, click or rub  "-  ABDOMEN:  soft, nontender, no HSM or masses and bowel sounds normal  MS: extremities normal- no gross deformities noted, no evidence of inflammation in joints, FROM in all extremities.  SKIN: no suspicious lesions or rashes  NEURO: Normal strength and tone, sensory exam grossly normal, mentation intact and speech normal  PSYCH: mentation appears normal and affect normal/bright  LYMPHATICS: No axillary, cervical, inguinal, or supraclavicular nodes      ASSESSMENT/PLAN:       Routine history and physical examination of adult  COUNSELING:   Reviewed preventive health counseling, as reflected in patient instructions       Regular exercise       Healthy diet/nutrition       Vision screening       Hearing screening    BP Readings from Last 1 Encounters:   06/15/18 121/75     Estimated body mass index is 34.81 kg/(m^2) as calculated from the following:    Height as of 6/15/18: 5' 10\" (1.778 m).    Weight as of 6/15/18: 242 lb 9.6 oz (110 kg).     reports that she quit smoking about 17 years ago. Her smoking use included Cigarettes. She started smoking about 20 years ago. She has never used smokeless tobacco.  Counseling Resources:  ATP IV Guidelines  Pooled Cohorts Equation Calculator  Breast Cancer Risk Calculator  FRAX Risk Assessment  ICSI Preventive Guidelines  Dietary Guidelines for Americans, 2010  [employer]'s MyPlate  ASA Prophylaxis  Lung CA Screening  - levonorgestrel-ethinyl estradiol (SEASONALE) 0.15-0.03 MG per tablet; Take 1 tablet by mouth daily  Dispense: 90 tablet; Refill: 3     Moderate recurrent major depression (H)  We discussed the med and will increase this from 300 mg to 450 mg daily now. Use the non drug therapies.   You are doing counseling and call or return if not better. If doing well for many weeks, consider reducing the dose back to 300 mg daily.   If you do this then just call our clinic RN.   - buPROPion (WELLBUTRIN XL) 150 MG 24 hr tablet; Take 3 pills, or 450 mg, daily.  Dispense: 90 tablet; " Refill: 11  - busPIRone (BUSPAR) 15 MG tablet; Take 1 tablet (15 mg) by mouth 2 times daily  Dispense: 180 tablet; Refill: 3  - traZODone (DESYREL) 50 MG tablet; Take 1-2 tablets by mouth at bedtime.  Dispense: 180 tablet; Refill: 3    Hypothyroidism, unspecified type  Stay on the med and monitor for the symptoms of high and low thyroid. The labs were normal and are done annually.   - levothyroxine (SYNTHROID/LEVOTHROID) 175 MCG tablet; Take 1 tablet (175 mcg) by mouth daily  Dispense: 90 tablet; Refill: 3     Generalized anxiety disorder  Use as needed. The written refill is done today for #20.   - ALPRAZolam (XANAX) 0.5 MG tablet; Take 1 tablet (0.5 mg) by mouth 3 times daily as needed for anxiety  Dispense: 20 tablet; Refill: 0        Fco Medieros MD  [facility]

## 2018-12-10 DIAGNOSIS — M06.09 RHEUMATOID ARTHRITIS OF MULTIPLE SITES WITH NEGATIVE RHEUMATOID FACTOR (H): ICD-10-CM

## 2018-12-10 DIAGNOSIS — Z79.899 HIGH RISK MEDICATION USE: ICD-10-CM

## 2018-12-10 LAB
ALBUMIN SERPL-MCNC: 3.9 G/DL (ref 3.4–5)
ALP SERPL-CCNC: 49 U/L (ref 40–150)
ALT SERPL W P-5'-P-CCNC: 37 U/L (ref 0–50)
AST SERPL W P-5'-P-CCNC: 28 U/L (ref 0–45)
BASOPHILS # BLD AUTO: 0 10E9/L (ref 0–0.2)
BASOPHILS NFR BLD AUTO: 0.7 %
BILIRUB DIRECT SERPL-MCNC: 0.1 MG/DL (ref 0–0.2)
BILIRUB SERPL-MCNC: 0.3 MG/DL (ref 0.2–1.3)
CREAT SERPL-MCNC: 0.91 MG/DL (ref 0.52–1.04)
CRP SERPL-MCNC: 7.3 MG/L (ref 0–8)
DIFFERENTIAL METHOD BLD: NORMAL
EOSINOPHIL # BLD AUTO: 0.2 10E9/L (ref 0–0.7)
EOSINOPHIL NFR BLD AUTO: 3 %
ERYTHROCYTE [DISTWIDTH] IN BLOOD BY AUTOMATED COUNT: 13.3 % (ref 10–15)
ERYTHROCYTE [SEDIMENTATION RATE] IN BLOOD BY WESTERGREN METHOD: 6 MM/H (ref 0–20)
GFR SERPL CREATININE-BSD FRML MDRD: 69 ML/MIN/1.7M2
HCT VFR BLD AUTO: 40.6 % (ref 35–47)
HGB BLD-MCNC: 13.8 G/DL (ref 11.7–15.7)
LYMPHOCYTES # BLD AUTO: 2.3 10E9/L (ref 0.8–5.3)
LYMPHOCYTES NFR BLD AUTO: 38.7 %
MCH RBC QN AUTO: 32.3 PG (ref 26.5–33)
MCHC RBC AUTO-ENTMCNC: 34 G/DL (ref 31.5–36.5)
MCV RBC AUTO: 95 FL (ref 78–100)
MONOCYTES # BLD AUTO: 0.5 10E9/L (ref 0–1.3)
MONOCYTES NFR BLD AUTO: 9.1 %
NEUTROPHILS # BLD AUTO: 2.9 10E9/L (ref 1.6–8.3)
NEUTROPHILS NFR BLD AUTO: 48.5 %
PLATELET # BLD AUTO: 183 10E9/L (ref 150–450)
PROT SERPL-MCNC: 7.3 G/DL (ref 6.8–8.8)
RBC # BLD AUTO: 4.27 10E12/L (ref 3.8–5.2)
WBC # BLD AUTO: 6 10E9/L (ref 4–11)

## 2018-12-10 PROCEDURE — 85025 COMPLETE CBC W/AUTO DIFF WBC: CPT | Performed by: INTERNAL MEDICINE

## 2018-12-10 PROCEDURE — 80076 HEPATIC FUNCTION PANEL: CPT | Performed by: INTERNAL MEDICINE

## 2018-12-10 PROCEDURE — 86140 C-REACTIVE PROTEIN: CPT | Performed by: INTERNAL MEDICINE

## 2018-12-10 PROCEDURE — 36415 COLL VENOUS BLD VENIPUNCTURE: CPT | Performed by: INTERNAL MEDICINE

## 2018-12-10 PROCEDURE — 82565 ASSAY OF CREATININE: CPT | Performed by: INTERNAL MEDICINE

## 2018-12-10 PROCEDURE — 85652 RBC SED RATE AUTOMATED: CPT | Performed by: INTERNAL MEDICINE

## 2018-12-14 ENCOUNTER — TELEPHONE (OUTPATIENT)
Dept: RHEUMATOLOGY | Facility: CLINIC | Age: 38
End: 2018-12-14

## 2018-12-14 ENCOUNTER — OFFICE VISIT (OUTPATIENT)
Dept: RHEUMATOLOGY | Facility: CLINIC | Age: 38
End: 2018-12-14
Payer: COMMERCIAL

## 2018-12-14 VITALS
SYSTOLIC BLOOD PRESSURE: 127 MMHG | TEMPERATURE: 97.6 F | HEART RATE: 97 BPM | DIASTOLIC BLOOD PRESSURE: 82 MMHG | OXYGEN SATURATION: 96 % | WEIGHT: 245.8 LBS | BODY MASS INDEX: 35.27 KG/M2

## 2018-12-14 DIAGNOSIS — M22.2X2 PATELLOFEMORAL SYNDROME OF BOTH KNEES: Primary | ICD-10-CM

## 2018-12-14 DIAGNOSIS — M22.2X1 PATELLOFEMORAL SYNDROME OF BOTH KNEES: Primary | ICD-10-CM

## 2018-12-14 DIAGNOSIS — M06.09 RHEUMATOID ARTHRITIS OF MULTIPLE SITES WITH NEGATIVE RHEUMATOID FACTOR (H): ICD-10-CM

## 2018-12-14 PROCEDURE — 20610 DRAIN/INJ JOINT/BURSA W/O US: CPT | Mod: 50 | Performed by: INTERNAL MEDICINE

## 2018-12-14 PROCEDURE — 99213 OFFICE O/P EST LOW 20 MIN: CPT | Mod: 25 | Performed by: INTERNAL MEDICINE

## 2018-12-14 RX ORDER — TRIAMCINOLONE ACETONIDE 40 MG/ML
40 INJECTION, SUSPENSION INTRA-ARTICULAR; INTRAMUSCULAR ONCE
Status: DISCONTINUED | OUTPATIENT
Start: 2018-12-14 | End: 2019-11-07

## 2018-12-14 RX ORDER — METHOTREXATE 2.5 MG/1
15 TABLET ORAL WEEKLY
Qty: 72 TABLET | Refills: 2 | Status: SHIPPED | OUTPATIENT
Start: 2018-12-14 | End: 2019-06-14

## 2018-12-14 RX ORDER — FOLIC ACID 1 MG/1
1 TABLET ORAL DAILY
Qty: 100 TABLET | Refills: 3 | Status: SHIPPED | OUTPATIENT
Start: 2018-12-14 | End: 2019-06-14

## 2018-12-14 RX ORDER — SULFASALAZINE 500 MG/1
1500 TABLET, DELAYED RELEASE ORAL 2 TIMES DAILY
Qty: 540 TABLET | Refills: 2 | Status: SHIPPED | OUTPATIENT
Start: 2018-12-14 | End: 2019-06-14

## 2018-12-14 ASSESSMENT — PAIN SCALES - GENERAL: PAINLEVEL: MODERATE PAIN (4)

## 2018-12-14 NOTE — PROGRESS NOTES
Rheumatology Clinic Visit      Vida Whitfield MRN# [id number]   YOB: 1980 Age: 38 year old      Date of visit: 12/14/18   PCP: Dr. Fco Medeiros    Chief Complaint   Patient presents with:  Arthritis: 6 months f/u RA. bilateral knee pain 4 out of 10 pain scale. would like to get cortisone injections in both knees      Assessment and Plan     1. Seronegative nonerosive rheumatoid arthritis: Currently on methotrexate 15 mg once weekly (Cr elevation possibly secondary to MTX so MTX was reduced with improvement of Cr), folic acid 1 mg daily, and SSZ 1500mg BID.  Doing well today and will maintain on his current medication regimen.    - Continue methotrexate 15mg once weekly  - Continue folic acid 1 mg daily  - Continue sulfasalazine to 1500mg BID   - Avoid oral NSAIDs because they are historically triggers for urticaria; tolerates voltaren gel  - Labs in 3 months: CBC, Creatinine, Hepatic Panel  - Labs in 6 months: CBC, Creatinine, Hepatic Panel, ESR, CRP      # On birth control per patient    2. Bilateral patellofemoral syndrome: Previous steroid injections have been effective. Repeat today as documented in the procedure section.  Strongly encouraged daily PT exercises and weight loss.     3. Urticaria: Historically, oral NSAIDs are triggers    4. Bone health: 9/20/2016 Vitamin D level normal.     5.  Vaccinations: Vaccinations reviewed with Ms. Whitfield.  Risks and benefits of vaccinations were discussed.  CDC stance on shingrix when on moderate to high immunosuppression reviewed.  I explained that Shingrix is used off label when under 50 years old and that the safety and efficacy of the vaccine has not been tested in people younger than 50 years old.   - Influenza: up to date  - [id number]: up to date  - [id number]: up to date  - Shingrix: advised that she receive; advised that she check on insurance coverage    Ms. Whitfield verbalized agreement with and understanding of the rational for the diagnosis and  treatment plan.  All questions were answered to best of my ability and the patient's satisfaction. Ms. Whitfield was advised to contact the clinic with any questions that may arise after the clinic visit.      Thank you for involving me in the care of the patient    Return to clinic: 6 months; sooner if needed      HPI   Vida Whitfield is a 38 year old female with a medical history significant for anxiety, depression, chronic idiopathic urticaria, hyperlipidemia, hypothyroidism, rosacea, obstructive sleep apnea, obesity, and inflammatory arthritis who presents for follow-up of rheumatoid arthritis.    Historical records in this paragraph: She was previously evaluated by Dr. Blaine Anthony at [facility].  2014 labs show negative: hepatitis C ab, HBV surface ag, HBV core ab, HLA-B27, Sm, RNP, SSA, SSB, Scl-70, DNA, cardiolipin, CCP, PR3, MPO. NIKKIE 1:160 homogeneous.  MPO also positive (one negative and one positive value). Per a 3/11/2016 clinic note, she has seronegative rheumatoid arthritis and chronic urticaria. She developed joint swelling and stiffness in the bilateral MCPs, MTPs, ankles, and knees that started in November 2013 shortly after tapering off prednisone that was used to manage chronic urticaria. No history of psoriasis or inflammatory bowel disease. Negative SI joint x-rays. Negative MRI of the SI joint. Flaring of her urticaria with NSAIDs. Steroid responsive in regard to her joints. Near resolution of joint stiffness and pain with methotrexate. NSAIDs are avoided because they cause flares of her urticaria.    Today, Ms. Whitfield reports that she is doing well.  Arthritis is doing well.  Morning stiffness for no more than 30 minutes.  Mild positive gelling phenomenon.  No joint swelling.  Bilateral knees are hurting again and she believes that it is because of all the different wrapping and getting up and down from the floor.  She would like to have repeat steroid injections of her knees today.  She  realizes that she needs to lose weight.  She also says that she is not doing her daily exercises for her knees.       Denies fevers, chills, nausea, vomiting, constipation, diarrhea. No abdominal pain. No chest pain/pressure, palpitations, or shortness of breath.  No neck pain. No oral or nasal sores.    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  CV: No chest pain, pressure, palpitations, or dyspnea on exertion.  PULM: No SOB, wheeze, cough.  GI: No nausea, vomiting, constipation, diarrhea. No blood in stool. No abdominal pain.  : No blood in urine.  MSK: See HPI.  NEURO: Negative  PSYCH: Negative    Active Problem List     Patient Active Problem List   Diagnosis     Contraceptive management     Hyperhidrosis of axilla     CARDIOVASCULAR SCREENING; LDL GOAL LESS THAN 160     Generalized anxiety disorder     Moderate recurrent major depression (H)     Chronic idiopathic urticaria     Hyperlipidemia with target LDL less than 130     Hypothyroidism, unspecified hypothyroidism type     Rosacea     Non morbid obesity due to excess calories     BRIDGETTE (obstructive sleep apnea)     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 factor (H)     High risk medication use     Past Medical History     Past Medical History:   Diagnosis Date     Arthritis 01/01/2013     Depressive disorder 01/01/2003     Peripheral autonomic neuropathy in disorders classified elsewhere(337.1)      Pure hypercholesterolemia      Thyroid disease 01/01/1995     Past Surgical History     Past Surgical History:   Procedure Laterality Date     C APPENDECTOMY  2002     CHOLECYSTECTOMY, LAPOROSCOPIC  2006    Cholecystectomy, Laparoscopic     SURGICAL HISTORY OF -   17  years old    Thyroid ablation     Allergy     Allergies   Allergen Reactions     Macrobid [Nitrofuran Derivatives]      Paxil [Paroxetine]      Anxiety         Prednisone      Vomiting      Zoloft      Mood changes     Current Medication List     Current Outpatient Medications   Medication Sig     ALPRAZolam (XANAX) 0.5 MG tablet Take 1 tablet (0.5 mg) by mouth 3 times daily as needed for anxiety     buPROPion (WELLBUTRIN XL) 150 MG 24 hr tablet Take 3 pills, or 450 mg, daily.     busPIRone (BUSPAR) 15 MG tablet Take 1 tablet (15 mg) by mouth 2 times daily     cetirizine (ZYRTEC ALLERGY) 10 MG tablet Take 20 mg by mouth daily.     clindamycin (CLINDAMAX) 1 % lotion Apply twice daily on face.     diclofenac (VOLTAREN) 1 % GEL topical gel Apply 2 grams to hands four times daily using enclosed dosing card.     fish oil-omega-3 fatty acids (OMEGA 3) 1000 MG capsule Take 2 capsules (2 g) by mouth daily     folic acid (FOLVITE) 1 MG tablet Take 1 tablet (1 mg) by mouth daily     Lactobacillus (ACIDOPHILUS PROBIOTIC FORMULA) TABS Take 1 tablet by mouth daily     levonorgestrel-ethinyl estradiol (SEASONALE) 0.15-0.03 MG per tablet Take 1 tablet by mouth daily     levothyroxine (SYNTHROID/LEVOTHROID) 175 MCG tablet Take 1 tablet (175 mcg) by mouth daily     methotrexate sodium 2.5 MG TABS Take 15 mg by mouth once a week . Take all 6 tablets on the same day of each week.     Multiple Vitamin (MULTIVITAMIN) per tablet Take 1 tablet by mouth daily.     order for DME Equipment being ordered: CPAP 6-10 cm     sulfaSALAzine ER (AZULFIDINE EN) 500 MG EC tablet Take 3 tablets (1,500 mg) by mouth 2 times daily     traZODone (DESYREL) 50 MG tablet Take 1-2 tablets by mouth at bedtime.     clindamycin (CLEOCIN T) 1 % external solution Apply topically 2 times daily (Patient not taking: Reported on 12/14/2018)     No current facility-administered medications for this visit.          Social History   See HPI    Family History     Family  "History   Problem Relation Age of Onset     Diabetes Mother      Hypertension Mother      Lipids Mother      Hyperlipidemia Mother      Hypertension Father      Lipids Father      Thyroid Disease Father      Cancer Father      Hyperlipidemia Father      Other Cancer Father         Gastric & Bone Cancer     Diabetes Maternal Grandmother      Diabetes Paternal Grandmother      Melanoma No family hx of        Physical Exam     Temp Readings from Last 3 Encounters:   12/14/18 97.6  F (36.4  C) (Oral)   10/05/18 97.3  F (36.3  C) (Tympanic)   06/15/18 97.9  F (36.6  C) (Oral)     BP Readings from Last 5 Encounters:   12/14/18 127/82   10/05/18 122/76   06/15/18 121/75   03/07/18 126/76   10/04/17 122/75     Pulse Readings from Last 1 Encounters:   12/14/18 97     Resp Readings from Last 1 Encounters:   10/05/18 12     Estimated body mass index is 35.27 kg/m  as calculated from the following:    Height as of 6/15/18: 1.778 m (5' 10\").    Weight as of this encounter: 111.5 kg (245 lb 12.8 oz).    GEN: NAD  HEENT: MMM.  Anicteric, noninjected sclera  CV: S1, S2. RRR. No m/r/g.  PULM: CTA bilaterally. No w/c.  MSK:  MCPs, PIPs, DIPs, wrists, elbows, shoulders, ankles, and feet without swelling or tenderness to palpation. Negative MCP squeeze. Negative MTP squeeze. Hips non tender to direct palpation.  Knees with mild crepitation but no effusion or increased warmth.  SKIN: No rash  EXT: No LE edema  PSYCH: Alert. Appropriate.    Labs / Imaging (select studies)     CBC  Recent Labs   Lab Test 12/10/18  1900 09/23/18  1057 06/09/18  1025   WBC 6.0 6.1 5.3   RBC 4.27 4.31 4.25   HGB 13.8 14.0 13.6   HCT 40.6 40.8 39.9   MCV 95 95 94   RDW 13.3 13.3 12.9    184 188   MCH 32.3 32.5 32.0   MCHC 34.0 34.3 34.1   NEUTROPHIL 48.5 50.9 41.1   LYMPH 38.7 34.0 45.5   MONOCYTE 9.1 13.5 9.3   EOSINOPHIL 3.0 0.8 3.0   BASOPHIL 0.7 0.8 1.1   ANEU 2.9 3.1 2.2   ALYM 2.3 2.1 2.4   FREDA 0.5 0.8 0.5   AEOS 0.2 0.1 0.2   ABAS 0.0 0.1 0.1 "     CMP  Recent Labs   Lab Test 12/10/18  1900 09/23/18  1058 09/23/18  1057 06/09/18  1025  12/20/16  1138  09/02/16  0736  01/26/12 2015   NA  --   --   --   --   --  138  --   --   --  140   POTASSIUM  --   --   --   --   --  4.4  --   --   --  4.0   CHLORIDE  --   --   --   --   --  104  --   --   --  104   CO2  --   --   --   --   --  30  --   --   --  26   ANIONGAP  --   --   --   --   --  4  --   --   --  10   GLC  --  79  --   --   --  84  --  88   < > 90   BUN  --   --   --   --   --  13  --   --   --  10   CR 0.91  --  1.12* 0.94   < > 1.05*   < >  --   --  0.79   GFRESTIMATED 69  --  55* 67   < > 59*   < >  --   --  85   GFRESTBLACK 84  --  66 81   < > 72   < >  --   --  >90   TATI  --   --   --   --   --  8.7  --   --   --  8.9   BILITOTAL 0.3  --  0.5 0.6   < >  --    < >  --   --  0.4   ALBUMIN 3.9  --  3.7 3.6   < >  --    < >  --   --  4.1   PROTTOTAL 7.3  --  7.4 6.8   < >  --    < >  --   --  7.5   ALKPHOS 49  --  48 47   < >  --    < >  --   --  56   AST 28  --  17 23   < >  --    < >  --   --  27   ALT 37  --  26 34   < >  --    < >  --   --  21    < > = values in this interval not displayed.     Calcium/VitaminD  Recent Labs   Lab Test 12/20/16  1138 09/20/16  1554 01/26/12 2015   TATI 8.7  --  8.9   VITDT  --  36  --      ESR/CRP  Recent Labs   Lab Test 12/10/18  1900 06/09/18  1025 03/04/18  1111   SED 6 6 8   CRP 7.3 6.1 9.7*       Immunization History     Immunization History   Administered Date(s) Administered     HepB 07/22/1999     Historical DTP/aP 1980, 1980, 1981, 11/02/1982     Influenza (IIV3) PF 11/05/1996     MMR 09/07/1992     OPV, trivalent, live 1981, 1981, 1981, 11/02/1982     Pneumo Conj 13-V (2010&after) 09/20/2016     Pneumococcal 23 valent 12/20/2016     TD (ADULT, 7+) 07/14/1995, 01/17/2006     Tdap (Adacel,Boostrix) 12/10/2013       Procedure     Procedure: Steroid injection of the bilateral knees  Indication: Pain, Patellofemoral  Syndrome Bilaterally    The procedure was explained in detail. Risks including infection, pain, structural damage such as cartilage damage and tendon rupture, fat atrophy, skin hyper-/hypo-pigmentation, and medication reaction was explained. The need for rest of the affected joint for one week after the procedure was explained.  The option of not doing the procedure was also provided. All questions were answered and the patient consented to the procedure.     A time-out was performed and the correct patient, procedure, and laterality were verified.    The right knee was examined and location for injection was identified - anterior medial. The area was cleaned with chlorhexidine, twice.  Ethyl chloride was then used for topical anaesthetic.  Then a mixture of lidocaine 1% 2 mL and Kenalog 40mg was injected into the intra-articular space.     The left knee was examined and location for injection was identified - anterior medial. The area was cleaned with chlorhexidine, twice.  Ethyl chloride was then used for topical anaesthetic.  Then a mixture of lidocaine 1% 2 mL and Kenalog 40mg was injected into the intra-articular space.     The patient tolerated the procedure well. No complications.    MEDICATION: Triamcinolone 40 mg  LOT #: [id number]  :  [employer]  EXPIRATION DATE:  4/01/2020  NDC#: [address]-1     MEDICATION: Triamcinolone 40 mg  LOT #: [id number]  :  [employer]  EXPIRATION DATE:  4/01/2020  NDC#: [address]-1     1% Lidocaine  : [employer]  Lot #: [id number]  EXPIRATION DATE: 12/01/2019  NDC: 3676-1437-87         Chart documentation done in part with Dragon Voice recognition Software. Although reviewed after completion, some word and grammatical error may remain.    Andrea Benítez MD

## 2018-12-14 NOTE — NURSING NOTE
The following medication was given:     MEDICATION: Triamcinolone 40 mg  SITE: Left knee  DOSE: 1 ml  LOT #: [id number]  :  [employer]  EXPIRATION DATE:  4/01/2020  NDC#: [address]-6    MEDICATION: Triamcinolone 40 mg  SITE: Right knee  DOSE: 1 ml  LOT #: [id number]  :  [employer]  EXPIRATION DATE:  4/01/2020  NDC#: [address]-3      1% Lidocaine  : Hospira  Lot #: [id number]  EXPIRATION DATE: 12/01/2019  NDC: 2992-7004-14

## 2018-12-14 NOTE — TELEPHONE ENCOUNTER
Called [facility] and provided verbal order per Dr. Benítez for Shingrix vaccine (qty 2.)  Pharmacy states they do not administer vaccines there.  Patient can either get vaccine at our clinic the next time she comes in or a different  pharmacy.  Called patient and left  for her to return call to [phone].    Hemanth Washington RN....12/14/2018 11:55 AM

## 2019-03-12 DIAGNOSIS — M06.09 RHEUMATOID ARTHRITIS OF MULTIPLE SITES WITH NEGATIVE RHEUMATOID FACTOR (H): ICD-10-CM

## 2019-03-12 LAB
ALBUMIN SERPL-MCNC: 3.7 G/DL (ref 3.4–5)
ALP SERPL-CCNC: 61 U/L (ref 40–150)
ALT SERPL W P-5'-P-CCNC: 43 U/L (ref 0–50)
AST SERPL W P-5'-P-CCNC: 33 U/L (ref 0–45)
BASOPHILS # BLD AUTO: 0.1 10E9/L (ref 0–0.2)
BASOPHILS NFR BLD AUTO: 0.9 %
BILIRUB DIRECT SERPL-MCNC: 0.2 MG/DL (ref 0–0.2)
BILIRUB SERPL-MCNC: 0.4 MG/DL (ref 0.2–1.3)
CREAT SERPL-MCNC: 0.92 MG/DL (ref 0.52–1.04)
DIFFERENTIAL METHOD BLD: NORMAL
EOSINOPHIL # BLD AUTO: 0 10E9/L (ref 0–0.7)
EOSINOPHIL NFR BLD AUTO: 0.7 %
ERYTHROCYTE [DISTWIDTH] IN BLOOD BY AUTOMATED COUNT: 12.7 % (ref 10–15)
GFR SERPL CREATININE-BSD FRML MDRD: 79 ML/MIN/{1.73_M2}
HCT VFR BLD AUTO: 40.1 % (ref 35–47)
HGB BLD-MCNC: 13.7 G/DL (ref 11.7–15.7)
LYMPHOCYTES # BLD AUTO: 2.4 10E9/L (ref 0.8–5.3)
LYMPHOCYTES NFR BLD AUTO: 41.4 %
MCH RBC QN AUTO: 32.8 PG (ref 26.5–33)
MCHC RBC AUTO-ENTMCNC: 34.2 G/DL (ref 31.5–36.5)
MCV RBC AUTO: 96 FL (ref 78–100)
MONOCYTES # BLD AUTO: 0.6 10E9/L (ref 0–1.3)
MONOCYTES NFR BLD AUTO: 9.6 %
NEUTROPHILS # BLD AUTO: 2.7 10E9/L (ref 1.6–8.3)
NEUTROPHILS NFR BLD AUTO: 47.4 %
PLATELET # BLD AUTO: 200 10E9/L (ref 150–450)
PROT SERPL-MCNC: 7.1 G/DL (ref 6.8–8.8)
RBC # BLD AUTO: 4.18 10E12/L (ref 3.8–5.2)
WBC # BLD AUTO: 5.7 10E9/L (ref 4–11)

## 2019-03-12 PROCEDURE — 80076 HEPATIC FUNCTION PANEL: CPT | Performed by: INTERNAL MEDICINE

## 2019-03-12 PROCEDURE — 85025 COMPLETE CBC W/AUTO DIFF WBC: CPT | Performed by: INTERNAL MEDICINE

## 2019-03-12 PROCEDURE — 36415 COLL VENOUS BLD VENIPUNCTURE: CPT | Performed by: INTERNAL MEDICINE

## 2019-03-12 PROCEDURE — 82565 ASSAY OF CREATININE: CPT | Performed by: INTERNAL MEDICINE

## 2019-06-11 DIAGNOSIS — M06.09 RHEUMATOID ARTHRITIS OF MULTIPLE SITES WITH NEGATIVE RHEUMATOID FACTOR (H): ICD-10-CM

## 2019-06-11 LAB
ALBUMIN SERPL-MCNC: 3.5 G/DL (ref 3.4–5)
ALP SERPL-CCNC: 52 U/L (ref 40–150)
ALT SERPL W P-5'-P-CCNC: 27 U/L (ref 0–50)
AST SERPL W P-5'-P-CCNC: 23 U/L (ref 0–45)
BASOPHILS # BLD AUTO: 0.1 10E9/L (ref 0–0.2)
BASOPHILS NFR BLD AUTO: 0.7 %
BILIRUB DIRECT SERPL-MCNC: 0.2 MG/DL (ref 0–0.2)
BILIRUB SERPL-MCNC: 0.6 MG/DL (ref 0.2–1.3)
CREAT SERPL-MCNC: 1 MG/DL (ref 0.52–1.04)
CRP SERPL-MCNC: 8 MG/L (ref 0–8)
DIFFERENTIAL METHOD BLD: NORMAL
EOSINOPHIL # BLD AUTO: 0.1 10E9/L (ref 0–0.7)
EOSINOPHIL NFR BLD AUTO: 1.6 %
ERYTHROCYTE [DISTWIDTH] IN BLOOD BY AUTOMATED COUNT: 13.1 % (ref 10–15)
ERYTHROCYTE [SEDIMENTATION RATE] IN BLOOD BY WESTERGREN METHOD: 5 MM/H (ref 0–20)
GFR SERPL CREATININE-BSD FRML MDRD: 71 ML/MIN/{1.73_M2}
HCT VFR BLD AUTO: 38.5 % (ref 35–47)
HGB BLD-MCNC: 13.3 G/DL (ref 11.7–15.7)
LYMPHOCYTES # BLD AUTO: 2.9 10E9/L (ref 0.8–5.3)
LYMPHOCYTES NFR BLD AUTO: 41.9 %
MCH RBC QN AUTO: 32.7 PG (ref 26.5–33)
MCHC RBC AUTO-ENTMCNC: 34.5 G/DL (ref 31.5–36.5)
MCV RBC AUTO: 95 FL (ref 78–100)
MONOCYTES # BLD AUTO: 0.8 10E9/L (ref 0–1.3)
MONOCYTES NFR BLD AUTO: 10.9 %
NEUTROPHILS # BLD AUTO: 3.1 10E9/L (ref 1.6–8.3)
NEUTROPHILS NFR BLD AUTO: 44.9 %
PLATELET # BLD AUTO: 166 10E9/L (ref 150–450)
PROT SERPL-MCNC: 6.5 G/DL (ref 6.8–8.8)
RBC # BLD AUTO: 4.07 10E12/L (ref 3.8–5.2)
WBC # BLD AUTO: 6.9 10E9/L (ref 4–11)

## 2019-06-11 PROCEDURE — 85025 COMPLETE CBC W/AUTO DIFF WBC: CPT | Performed by: INTERNAL MEDICINE

## 2019-06-11 PROCEDURE — 85652 RBC SED RATE AUTOMATED: CPT | Performed by: INTERNAL MEDICINE

## 2019-06-11 PROCEDURE — 80076 HEPATIC FUNCTION PANEL: CPT | Performed by: INTERNAL MEDICINE

## 2019-06-11 PROCEDURE — 36415 COLL VENOUS BLD VENIPUNCTURE: CPT | Performed by: INTERNAL MEDICINE

## 2019-06-11 PROCEDURE — 82565 ASSAY OF CREATININE: CPT | Performed by: INTERNAL MEDICINE

## 2019-06-11 PROCEDURE — 86140 C-REACTIVE PROTEIN: CPT | Performed by: INTERNAL MEDICINE

## 2019-06-14 ENCOUNTER — OFFICE VISIT (OUTPATIENT)
Dept: RHEUMATOLOGY | Facility: CLINIC | Age: 39
End: 2019-06-14
Payer: COMMERCIAL

## 2019-06-14 VITALS
SYSTOLIC BLOOD PRESSURE: 131 MMHG | HEIGHT: 70 IN | OXYGEN SATURATION: 96 % | WEIGHT: 253 LBS | HEART RATE: 95 BPM | BODY MASS INDEX: 36.22 KG/M2 | DIASTOLIC BLOOD PRESSURE: 88 MMHG

## 2019-06-14 DIAGNOSIS — M22.2X1 BILATERAL PATELLOFEMORAL SYNDROME: ICD-10-CM

## 2019-06-14 DIAGNOSIS — Z79.899 HIGH RISK MEDICATIONS (NOT ANTICOAGULANTS) LONG-TERM USE: ICD-10-CM

## 2019-06-14 DIAGNOSIS — M22.2X2 BILATERAL PATELLOFEMORAL SYNDROME: ICD-10-CM

## 2019-06-14 DIAGNOSIS — M06.09 RHEUMATOID ARTHRITIS OF MULTIPLE SITES WITH NEGATIVE RHEUMATOID FACTOR (H): Primary | ICD-10-CM

## 2019-06-14 PROCEDURE — 99213 OFFICE O/P EST LOW 20 MIN: CPT | Mod: 25 | Performed by: INTERNAL MEDICINE

## 2019-06-14 PROCEDURE — 20610 DRAIN/INJ JOINT/BURSA W/O US: CPT | Mod: 50 | Performed by: INTERNAL MEDICINE

## 2019-06-14 RX ORDER — FOLIC ACID 1 MG/1
1 TABLET ORAL DAILY
Qty: 100 TABLET | Refills: 3 | Status: SHIPPED | OUTPATIENT
Start: 2019-06-14 | End: 2019-12-20

## 2019-06-14 RX ORDER — SULFASALAZINE 500 MG/1
1500 TABLET, DELAYED RELEASE ORAL 2 TIMES DAILY
Qty: 540 TABLET | Refills: 2 | Status: SHIPPED | OUTPATIENT
Start: 2019-06-14 | End: 2019-12-20

## 2019-06-14 RX ORDER — TRIAMCINOLONE ACETONIDE 40 MG/ML
40 INJECTION, SUSPENSION INTRA-ARTICULAR; INTRAMUSCULAR ONCE
Status: COMPLETED | OUTPATIENT
Start: 2019-06-14 | End: 2019-06-14

## 2019-06-14 RX ORDER — METHOTREXATE 2.5 MG/1
15 TABLET ORAL WEEKLY
Qty: 72 TABLET | Refills: 2 | Status: SHIPPED | OUTPATIENT
Start: 2019-06-14 | End: 2019-12-20

## 2019-06-14 RX ADMIN — TRIAMCINOLONE ACETONIDE 40 MG: 40 INJECTION, SUSPENSION INTRA-ARTICULAR; INTRAMUSCULAR at 11:10

## 2019-06-14 ASSESSMENT — MIFFLIN-ST. JEOR: SCORE: 1907.85

## 2019-06-14 NOTE — PROGRESS NOTES
Rheumatology Clinic Visit      Vida Whitfield MRN# [id number]   YOB: 1980 Age: 39 year old      Date of visit: 6/14/19   PCP: Dr. Fco Medeiros    Chief Complaint   Patient presents with:  Arthritis: RA, patient is having pain, stiffness and swelling in her knees.      Assessment and Plan     1. Seronegative nonerosive rheumatoid arthritis: Currently on methotrexate 15 mg once weekly (Cr elevation possibly secondary to MTX so MTX was reduced with improvement of Cr), folic acid 1 mg daily, and SSZ 1500mg BID.  Doing well today and will maintain on his current medication regimen.    - Continue methotrexate 15mg once weekly  - Continue folic acid 1 mg daily  - Continue sulfasalazine to 1500mg BID   - Avoid oral NSAIDs because they are historically triggers for urticaria; tolerates voltaren gel  - Labs in 3 months: CBC, Creatinine, Hepatic Panel  - Labs in 6 months: CBC, Creatinine, Hepatic Panel, ESR, CRP      # On birth control per patient    2. Bilateral patellofemoral syndrome: Previous steroid injections have been effective. Repeat today as documented in the procedure section.  Strongly encouraged daily PT exercises and weight loss.  We had a thorough discussion about the need for weight loss today, and to continue the PT exercises for her knees.     3. Urticaria: Historically, oral NSAIDs are triggers.  Documented here for clinical significance only.  Not managed in this clinic.    4. Bone health: 9/20/2016 Vitamin D level normal.     5.  Vaccinations: Vaccinations reviewed with Ms. Whitfield.  Risks and benefits of vaccinations were discussed.  CDC stance on shingrix when on moderate to high immunosuppression reviewed.  I explained that Shingrix is used off label when under 50 years old and that the safety and efficacy of the vaccine has not been tested in people younger than 50 years old.   - Influenza: to receive at least 8 weeks after [id number] is administered  - [id number]: up to date  -  [id number]: up to date  - Shingrix: advised that she receive; advised that she check on insurance coverage    Ms. Whitfield verbalized agreement with and understanding of the rational for the diagnosis and treatment plan.  All questions were answered to best of my ability and the patient's satisfaction. Ms. Whitfield was advised to contact the clinic with any questions that may arise after the clinic visit.      Thank you for involving me in the care of the patient    Return to clinic: 6 months; sooner if needed      HPI   Vida Whitfield is a 39 year old female with a medical history significant for anxiety, depression, chronic idiopathic urticaria, hyperlipidemia, hypothyroidism, rosacea, obstructive sleep apnea, obesity, and inflammatory arthritis who presents for follow-up of rheumatoid arthritis.    Historical records in this paragraph: She was previously evaluated by Dr. Blaine Anthony at [facility].  2014 labs show negative: hepatitis C ab, HBV surface ag, HBV core ab, HLA-B27, Sm, RNP, SSA, SSB, Scl-70, DNA, cardiolipin, CCP, PR3, MPO. NIKKIE 1:160 homogeneous.  MPO also positive (one negative and one positive value). Per a 3/11/2016 clinic note, she has seronegative rheumatoid arthritis and chronic urticaria. She developed joint swelling and stiffness in the bilateral MCPs, MTPs, ankles, and knees that started in November 2013 shortly after tapering off prednisone that was used to manage chronic urticaria. No history of psoriasis or inflammatory bowel disease. Negative SI joint x-rays. Negative MRI of the SI joint. Flaring of her urticaria with NSAIDs. Steroid responsive in regard to her joints. Near resolution of joint stiffness and pain with methotrexate. NSAIDs are avoided because they cause flares of her urticaria.    Today, Ms. Whitfield reports that she is doing well.  He has been gaining weight and has been trying to focus on diet and healthy lifestyle.  Morning stiffness for no more than 20-30 minutes.  All  joints are doing well except for her knees that are worse with increased activity, especially with going up and down stairs.  She would like to have repeat steroid injections of her knees today.  She says that she is not doing physical therapy exercises on a regular basis because she does not have time to do them.  She realizes she needs to focus more on weight loss.      Denies fevers, chills, nausea, vomiting, constipation, diarrhea. No abdominal pain. No chest pain/pressure, palpitations, or shortness of breath.  No neck pain. No oral or nasal sores.    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 Gaining weight.  SKIN: No itching, rashes, sores recently; hx of urticaria  HEENT: No oral or nasal ulcers.  CV: No chest pain, pressure, palpitations, or dyspnea on exertion.  PULM: No SOB, wheeze, cough.  GI: No nausea, vomiting, constipation, diarrhea. No blood in stool. No abdominal pain.  : No blood in urine.  MSK: See HPI.  NEURO: Negative  PSYCH: Negative    Active Problem List     Patient Active Problem List   Diagnosis     Contraceptive management     Hyperhidrosis of axilla     CARDIOVASCULAR SCREENING; LDL GOAL LESS THAN 160     Generalized anxiety disorder     Moderate recurrent major depression (H)     Chronic idiopathic urticaria     Hyperlipidemia with target LDL less than 130     Hypothyroidism, unspecified hypothyroidism type     Rosacea     Non morbid obesity due to excess calories     BRIDGETTE (obstructive sleep apnea)     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 factor (H)     High risk medication use     Past Medical History     Past Medical History:   Diagnosis Date     Arthritis 01/01/2013     Depressive disorder 01/01/2003     Peripheral autonomic neuropathy in disorders classified elsewhere(337.1)      Pure  hypercholesterolemia      Thyroid disease 01/01/1995     Past Surgical History     Past Surgical History:   Procedure Laterality Date     C APPENDECTOMY  2002     CHOLECYSTECTOMY, LAPOROSCOPIC  2006    Cholecystectomy, Laparoscopic     SURGICAL HISTORY OF -   17 years old    Thyroid ablation     Allergy     Allergies   Allergen Reactions     Macrobid [Nitrofuran Derivatives]      Paxil [Paroxetine]      Anxiety         Prednisone      Vomiting      Zoloft      Mood changes     Current Medication List     Current Outpatient Medications   Medication Sig     ALPRAZolam (XANAX) 0.5 MG tablet Take 1 tablet (0.5 mg) by mouth 3 times daily as needed for anxiety     buPROPion (WELLBUTRIN XL) 150 MG 24 hr tablet Take 3 pills, or 450 mg, daily.     busPIRone (BUSPAR) 15 MG tablet Take 1 tablet (15 mg) by mouth 2 times daily     cetirizine (ZYRTEC ALLERGY) 10 MG tablet Take 20 mg by mouth daily.     clindamycin (CLINDAMAX) 1 % lotion Apply twice daily on face.     diclofenac (VOLTAREN) 1 % GEL topical gel Apply 2 grams to hands four times daily using enclosed dosing card.     folic acid (FOLVITE) 1 MG tablet Take 1 tablet (1 mg) by mouth daily     levonorgestrel-ethinyl estradiol (SEASONALE) 0.15-0.03 MG per tablet Take 1 tablet by mouth daily     levothyroxine (SYNTHROID/LEVOTHROID) 175 MCG tablet Take 1 tablet (175 mcg) by mouth daily     methotrexate sodium 2.5 MG TABS Take 6 tablets (15 mg) by mouth once a week . Take all 6 tablets on the same day of each week.     Multiple Vitamin (MULTIVITAMIN) per tablet Take 1 tablet by mouth daily.     sulfaSALAzine ER (AZULFIDINE EN) 500 MG EC tablet Take 3 tablets (1,500 mg) by mouth 2 times daily     traZODone (DESYREL) 50 MG tablet Take 1-2 tablets by mouth at bedtime.     clindamycin (CLEOCIN T) 1 % external solution Apply topically 2 times daily (Patient not taking: Reported on 12/14/2018)     fish oil-omega-3 fatty acids (OMEGA 3) 1000 MG capsule Take 2 capsules (2 g) by mouth  "daily     Lactobacillus (ACIDOPHILUS PROBIOTIC FORMULA) TABS Take 1 tablet by mouth daily     order for DME Equipment being ordered: CPAP 6-10 cm     Current Facility-Administered Medications   Medication     triamcinolone (KENALOG-40) injection 40 mg     triamcinolone (KENALOG-40) injection 40 mg         Social History   See HPI    Family History     Family History   Problem Relation Age of Onset     Diabetes Mother      Hypertension Mother      Lipids Mother      Hyperlipidemia Mother      Hypertension Father      Lipids Father      Thyroid Disease Father      Cancer Father      Hyperlipidemia Father      Other Cancer Father         Gastric & Bone Cancer     Diabetes Maternal Grandmother      Diabetes Paternal Grandmother      Melanoma No family hx of        Physical Exam     Temp Readings from Last 3 Encounters:   12/14/18 97.6  F (36.4  C) (Oral)   10/05/18 97.3  F (36.3  C) (Tympanic)   06/15/18 97.9  F (36.6  C) (Oral)     BP Readings from Last 5 Encounters:   06/14/19 131/88   12/14/18 127/82   10/05/18 122/76   06/15/18 121/75   03/07/18 126/76     Pulse Readings from Last 1 Encounters:   06/14/19 95     Resp Readings from Last 1 Encounters:   10/05/18 12     Estimated body mass index is 36.3 kg/m  as calculated from the following:    Height as of this encounter: 1.778 m (5' 10\").    Weight as of this encounter: 114.8 kg (253 lb).    GEN: NAD  HEENT: MMM.  Anicteric, noninjected sclera  CV: S1, S2. RRR. No m/r/g.  PULM: CTA bilaterally. No w/c.  MSK:  MCPs, PIPs, DIPs, wrists, elbows, shoulders, ankles, and feet without swelling or tenderness to palpation. Negative MCP squeeze. Negative MTP squeeze. Hips non tender to direct palpation.  Knees with mild crepitation and mild medial joint line tenderness but no effusion or increased warmth.  SKIN: No rash  EXT: No LE edema  PSYCH: Alert. Appropriate.    Labs / Imaging (select studies)     CBC  Recent Labs   Lab Test 06/11/19  0724 03/12/19  1708 12/10/18  1900 "   WBC 6.9 5.7 6.0   RBC 4.07 4.18 4.27   HGB 13.3 13.7 13.8   HCT 38.5 40.1 40.6   MCV 95 96 95   RDW 13.1 12.7 13.3    200 183   MCH 32.7 32.8 32.3   MCHC 34.5 34.2 34.0   NEUTROPHIL 44.9 47.4 48.5   LYMPH 41.9 41.4 38.7   MONOCYTE 10.9 9.6 9.1   EOSINOPHIL 1.6 0.7 3.0   BASOPHIL 0.7 0.9 0.7   ANEU 3.1 2.7 2.9   ALYM 2.9 2.4 2.3   FREDA 0.8 0.6 0.5   AEOS 0.1 0.0 0.2   ABAS 0.1 0.1 0.0     CMP  Recent Labs   Lab Test 06/11/19  0724 03/12/19  1708 12/10/18  1900 09/23/18  1058  12/20/16  1138  09/02/16  0736  01/26/12 2015   NA  --   --   --   --   --  138  --   --   --  140   POTASSIUM  --   --   --   --   --  4.4  --   --   --  4.0   CHLORIDE  --   --   --   --   --  104  --   --   --  104   CO2  --   --   --   --   --  30  --   --   --  26   ANIONGAP  --   --   --   --   --  4  --   --   --  10   GLC  --   --   --  79  --  84  --  88   < > 90   BUN  --   --   --   --   --  13  --   --   --  10   CR 1.00 0.92 0.91  --    < > 1.05*   < >  --   --  0.79   GFRESTIMATED 71 79 69  --    < > 59*   < >  --   --  85   GFRESTBLACK 83 >90 84  --    < > 72   < >  --   --  >90   TATI  --   --   --   --   --  8.7  --   --   --  8.9   BILITOTAL 0.6 0.4 0.3  --    < >  --    < >  --   --  0.4   ALBUMIN 3.5 3.7 3.9  --    < >  --    < >  --   --  4.1   PROTTOTAL 6.5* 7.1 7.3  --    < >  --    < >  --   --  7.5   ALKPHOS 52 61 49  --    < >  --    < >  --   --  56   AST 23 33 28  --    < >  --    < >  --   --  27   ALT 27 43 37  --    < >  --    < >  --   --  21    < > = values in this interval not displayed.     ESR/CRP  Recent Labs   Lab Test 06/11/19  0724 12/10/18  1900 06/09/18  1025   SED 5 6 6   CRP 8.0 7.3 6.1     Immunization History     Immunization History   Administered Date(s) Administered     HepB 07/22/1999     Historical DTP/aP 1980, 1980, 1981, 11/02/1982     Influenza (IIV3) PF 11/05/1996     MMR 09/07/1992     OPV, trivalent, live 1981, 1981, 1981, 11/02/1982     Pneumo  Conj 13-V (2010&after) 09/20/2016     Pneumococcal 23 valent 12/20/2016     TD (ADULT, 7+) 07/14/1995, 01/17/2006     Tdap (Adacel,Boostrix) 12/10/2013       Procedure     Procedure: Steroid injection of the bilateral knees  Indication: Pain, Patellofemoral Syndrome of both knees    The procedure was explained in detail. Risks including infection, pain, structural damage such as cartilage damage and tendon rupture, fat atrophy, skin hyper-/hypo-pigmentation, and medication reaction was explained. The need for rest of the affected joint for one week after the procedure was explained.  The option of not doing the procedure was also provided. All questions were answered and the patient consented to the procedure.     A time-out was performed and the correct patient, procedure, and laterality were verified.    The right knee was examined and location for injection was identified - anterior medial. The area was cleaned with chlorhexidine, twice.  Ethyl chloride was then used for topical anaesthetic.  Then a mixture of lidocaine 1% 2 mL and triamcinolone 40mg was injected into the intra-articular space.     The left knee was examined and location for injection was identified - anterior medial. The area was cleaned with chlorhexidine, twice.  Ethyl chloride was then used for topical anaesthetic.  Then a mixture of lidocaine 1% 2 mL and triamcinolone 40mg was injected into the intra-articular space.     The patient tolerated the procedure well. No complications.    MEDICATION: Triamcinolone 40 mg  LOT #: [id number]  :  [email or web address]  EXPIRATION DATE:  01/2021  NDC#: [address]-7     MEDICATION: Triamcinolone 40 mg  LOT #: [id number]  :  [email or web address]  EXPIRATION DATE:  01/2021  NDC#: [address]-2     1% Lidocaine  :  [employer]  Lot #: [id number]  Expiration date: 08/2020  NDC: 3014-3218-06         Chart documentation done in part with Dragon Voice recognition Software.  Although reviewed after completion, some word and grammatical error may remain.    Andrea Benítez MD

## 2019-06-14 NOTE — NURSING NOTE
The following medication was given:     MEDICATION: Triamcinolone 40 mg  SITE: Right knee  DOSE: 1 ml  LOT #: [id number]  :  [employer]  EXPIRATION DATE:  01/2021  NDC#: [address]-4  Time consent was signed: 11:06AM    MEDICATION: Triamcinolone 40 mg  SITE: Left knee  DOSE: 1 ml  LOT #: [id number]  :  [employer]  EXPIRATION DATE:  01/2021  NDC#: [address]-2  Time consent was signed: 11:06AM    1% Lidocaine  :  [employer]  Lot #: [id number]  Expiration date: 08/2020  NDC: 3614-0543-82

## 2019-06-14 NOTE — PATIENT INSTRUCTIONS
Rheumatology    Dr. Andrea Benítez         [facility]   (Monday)  [address]  [place] [address]       [facility]   (Tuesday)  [address] [place] [address]    [facility]   (Wed., Thurs., and Friday)  [address] [place] [address]    Phone number: [phone]  Thank you for choosing [facility].  Minnie DAHL

## 2019-06-14 NOTE — NURSING NOTE
RAPID3 (0-30) Cumulative Score  10.3          RAPID3 Weighted Score (divide #4 by 3 and that is the weighted score)  3.4     Nicolette Sarmiento [facility]  6/14/2019 10:47 AM

## 2019-10-10 ENCOUNTER — DOCUMENTATION ONLY (OUTPATIENT)
Dept: LAB | Facility: CLINIC | Age: 39
End: 2019-10-10

## 2019-10-10 DIAGNOSIS — E78.5 HYPERLIPIDEMIA WITH TARGET LDL LESS THAN 130: Primary | Chronic | ICD-10-CM

## 2019-10-10 DIAGNOSIS — E03.9 HYPOTHYROIDISM: Chronic | ICD-10-CM

## 2019-10-10 NOTE — PROGRESS NOTES
After looking in patients chart she needs a Lipid too.  I have called and left message for patient to come fasting as she needs lipid too. Yashira GALLAGHER RN

## 2019-10-11 ENCOUNTER — MYC MEDICAL ADVICE (OUTPATIENT)
Dept: FAMILY MEDICINE | Facility: CLINIC | Age: 39
End: 2019-10-11

## 2019-10-11 DIAGNOSIS — E78.5 HYPERLIPIDEMIA WITH TARGET LDL LESS THAN 130: Primary | Chronic | ICD-10-CM

## 2019-10-11 NOTE — TELEPHONE ENCOUNTER
Patient has lab on 10/16/19 for lipid and tsh. Would like to check glucose due to family history of diabetes.     No results found for: A1C  Last glucose 9/23/18: 79    Order is pended for signature if appropriate.      TRACY EscalanteN, RN

## 2019-10-13 DIAGNOSIS — E03.9 HYPOTHYROIDISM: Chronic | ICD-10-CM

## 2019-10-13 DIAGNOSIS — M06.09 RHEUMATOID ARTHRITIS OF MULTIPLE SITES WITH NEGATIVE RHEUMATOID FACTOR (H): ICD-10-CM

## 2019-10-13 LAB
ALBUMIN SERPL-MCNC: 3.7 G/DL (ref 3.4–5)
ALP SERPL-CCNC: 43 U/L (ref 40–150)
ALT SERPL W P-5'-P-CCNC: 27 U/L (ref 0–50)
AST SERPL W P-5'-P-CCNC: 17 U/L (ref 0–45)
BASOPHILS # BLD AUTO: 0.1 10E9/L (ref 0–0.2)
BASOPHILS NFR BLD AUTO: 0.8 %
BILIRUB DIRECT SERPL-MCNC: 0.2 MG/DL (ref 0–0.2)
BILIRUB SERPL-MCNC: 0.5 MG/DL (ref 0.2–1.3)
CREAT SERPL-MCNC: 0.97 MG/DL (ref 0.52–1.04)
DIFFERENTIAL METHOD BLD: NORMAL
EOSINOPHIL # BLD AUTO: 0.1 10E9/L (ref 0–0.7)
EOSINOPHIL NFR BLD AUTO: 1.3 %
ERYTHROCYTE [DISTWIDTH] IN BLOOD BY AUTOMATED COUNT: 13.2 % (ref 10–15)
GFR SERPL CREATININE-BSD FRML MDRD: 74 ML/MIN/{1.73_M2}
HCT VFR BLD AUTO: 41.4 % (ref 35–47)
HGB BLD-MCNC: 14 G/DL (ref 11.7–15.7)
LYMPHOCYTES # BLD AUTO: 2.4 10E9/L (ref 0.8–5.3)
LYMPHOCYTES NFR BLD AUTO: 33.3 %
MCH RBC QN AUTO: 32.2 PG (ref 26.5–33)
MCHC RBC AUTO-ENTMCNC: 33.8 G/DL (ref 31.5–36.5)
MCV RBC AUTO: 95 FL (ref 78–100)
MONOCYTES # BLD AUTO: 0.6 10E9/L (ref 0–1.3)
MONOCYTES NFR BLD AUTO: 8.9 %
NEUTROPHILS # BLD AUTO: 3.9 10E9/L (ref 1.6–8.3)
NEUTROPHILS NFR BLD AUTO: 55.7 %
PLATELET # BLD AUTO: 189 10E9/L (ref 150–450)
PROT SERPL-MCNC: 7.2 G/DL (ref 6.8–8.8)
RBC # BLD AUTO: 4.35 10E12/L (ref 3.8–5.2)
TSH SERPL DL<=0.005 MIU/L-ACNC: 1.6 MU/L (ref 0.4–4)
WBC # BLD AUTO: 7.1 10E9/L (ref 4–11)

## 2019-10-13 PROCEDURE — 36415 COLL VENOUS BLD VENIPUNCTURE: CPT | Performed by: INTERNAL MEDICINE

## 2019-10-13 PROCEDURE — 80076 HEPATIC FUNCTION PANEL: CPT | Performed by: INTERNAL MEDICINE

## 2019-10-13 PROCEDURE — 82565 ASSAY OF CREATININE: CPT | Performed by: INTERNAL MEDICINE

## 2019-10-13 PROCEDURE — 84443 ASSAY THYROID STIM HORMONE: CPT | Performed by: INTERNAL MEDICINE

## 2019-10-13 PROCEDURE — 85025 COMPLETE CBC W/AUTO DIFF WBC: CPT | Performed by: INTERNAL MEDICINE

## 2019-10-14 ENCOUNTER — MYC REFILL (OUTPATIENT)
Dept: FAMILY MEDICINE | Facility: CLINIC | Age: 39
End: 2019-10-14

## 2019-10-14 DIAGNOSIS — E03.9 HYPOTHYROIDISM, UNSPECIFIED TYPE: ICD-10-CM

## 2019-10-15 NOTE — TELEPHONE ENCOUNTER
"Requested Prescriptions   Pending Prescriptions Disp Refills     levothyroxine (SYNTHROID/LEVOTHROID) 175 MCG tablet 90 tablet 3     Sig: Take 1 tablet (175 mcg) by mouth daily       Thyroid Protocol Passed - 10/15/2019  1:58 PM        Passed - Patient is 12 years or older        Passed - Recent (12 mo) or future (30 days) visit within the authorizing provider's specialty     Patient has had an office visit with the authorizing provider or a provider within the authorizing providers department within the previous 12 mos or has a future within next 30 days. See \"Patient Info\" tab in inbasket, or \"Choose Columns\" in Meds & Orders section of the refill encounter.              Passed - Medication is active on med list        Passed - Normal TSH on file in past 12 months     Recent Labs   Lab Test 10/13/19  1140   TSH 1.60              Passed - No active pregnancy on record     If patient is pregnant or has had a positive pregnancy test, please check TSH.          Passed - No positive pregnancy test in past 12 months     If patient is pregnant or has had a positive pregnancy test, please check TSH.          Last Written Prescription Date:  10/5/2018  Last Fill Quantity: 90,  # refills: 3   Last office visit: 10/5/2018 with prescribing provider:  Mala    Future Office Visit:   Next 5 appointments (look out 90 days)    Oct 25, 2019  7:20 AM CDT  MyChart Physical Adult with Fco Medeiros MD  [facility] ([facility])  Arrive at: Clinic A [address] [place] [address]  [phone]   Dec 20, 2019  9:00 AM CST  Return Visit with Andrea Benítez MD  [facility] [place] ([facility] [place]) [address]  [place] [address]  [phone]           "

## 2019-10-16 RX ORDER — LEVOTHYROXINE SODIUM 175 UG/1
175 TABLET ORAL DAILY
Qty: 30 TABLET | Refills: 0 | Status: SHIPPED | OUTPATIENT
Start: 2019-10-16 | End: 2019-11-07

## 2019-10-16 NOTE — TELEPHONE ENCOUNTER
Medication is being filled for 1 time refill only due to:  Patient needs to be seen because due for appt.   Has pending appt 10-25-19.  Siobhan SOUZA RN

## 2019-10-19 DIAGNOSIS — E78.5 HYPERLIPIDEMIA WITH TARGET LDL LESS THAN 130: Chronic | ICD-10-CM

## 2019-10-19 LAB
CHOLEST SERPL-MCNC: 187 MG/DL
GLUCOSE SERPL-MCNC: 81 MG/DL (ref 70–99)
HDLC SERPL-MCNC: 58 MG/DL
LDLC SERPL CALC-MCNC: 113 MG/DL
NONHDLC SERPL-MCNC: 129 MG/DL
TRIGL SERPL-MCNC: 80 MG/DL

## 2019-10-19 PROCEDURE — 82947 ASSAY GLUCOSE BLOOD QUANT: CPT | Performed by: FAMILY MEDICINE

## 2019-10-19 PROCEDURE — 36415 COLL VENOUS BLD VENIPUNCTURE: CPT | Performed by: FAMILY MEDICINE

## 2019-10-19 PROCEDURE — 80061 LIPID PANEL: CPT | Performed by: FAMILY MEDICINE

## 2019-10-22 ENCOUNTER — MYC REFILL (OUTPATIENT)
Dept: FAMILY MEDICINE | Facility: CLINIC | Age: 39
End: 2019-10-22

## 2019-10-22 DIAGNOSIS — F33.1 MODERATE RECURRENT MAJOR DEPRESSION (H): ICD-10-CM

## 2019-10-24 RX ORDER — BUPROPION HYDROCHLORIDE 150 MG/1
TABLET ORAL
Qty: 90 TABLET | Refills: 0 | Status: SHIPPED | OUTPATIENT
Start: 2019-10-24 | End: 2019-11-07

## 2019-11-03 ENCOUNTER — HEALTH MAINTENANCE LETTER (OUTPATIENT)
Age: 39
End: 2019-11-03

## 2019-11-04 ASSESSMENT — ENCOUNTER SYMPTOMS
BREAST MASS: 0
HEMATURIA: 0
ARTHRALGIAS: 1
FEVER: 0
NERVOUS/ANXIOUS: 0
DIZZINESS: 0
MYALGIAS: 0
PALPITATIONS: 0
COUGH: 0
DYSURIA: 0
HEADACHES: 0
CONSTIPATION: 0
NAUSEA: 0
EYE PAIN: 0
DIARRHEA: 0
WEAKNESS: 0
PARESTHESIAS: 0
JOINT SWELLING: 0
FREQUENCY: 0
ABDOMINAL PAIN: 0
CHILLS: 0
SORE THROAT: 0
HEARTBURN: 0
HEMATOCHEZIA: 0
SHORTNESS OF BREATH: 0

## 2019-11-04 ASSESSMENT — PATIENT HEALTH QUESTIONNAIRE - PHQ9
10. IF YOU CHECKED OFF ANY PROBLEMS, HOW DIFFICULT HAVE THESE PROBLEMS MADE IT FOR YOU TO DO YOUR WORK, TAKE CARE OF THINGS AT HOME, OR GET ALONG WITH OTHER PEOPLE: SOMEWHAT DIFFICULT
SUM OF ALL RESPONSES TO PHQ QUESTIONS 1-9: 11
SUM OF ALL RESPONSES TO PHQ QUESTIONS 1-9: 11

## 2019-11-05 ASSESSMENT — PATIENT HEALTH QUESTIONNAIRE - PHQ9: SUM OF ALL RESPONSES TO PHQ QUESTIONS 1-9: 11

## 2019-11-07 ENCOUNTER — OFFICE VISIT (OUTPATIENT)
Dept: FAMILY MEDICINE | Facility: CLINIC | Age: 39
End: 2019-11-07
Payer: COMMERCIAL

## 2019-11-07 VITALS
DIASTOLIC BLOOD PRESSURE: 82 MMHG | WEIGHT: 252.6 LBS | TEMPERATURE: 98.1 F | RESPIRATION RATE: 16 BRPM | OXYGEN SATURATION: 99 % | HEART RATE: 91 BPM | SYSTOLIC BLOOD PRESSURE: 110 MMHG | HEIGHT: 70 IN | BODY MASS INDEX: 36.16 KG/M2

## 2019-11-07 DIAGNOSIS — E03.9 HYPOTHYROIDISM, UNSPECIFIED TYPE: ICD-10-CM

## 2019-11-07 DIAGNOSIS — F33.1 MODERATE RECURRENT MAJOR DEPRESSION (H): ICD-10-CM

## 2019-11-07 DIAGNOSIS — M94.0 COSTOCHONDRITIS: ICD-10-CM

## 2019-11-07 DIAGNOSIS — Z00.00 ROUTINE GENERAL MEDICAL EXAMINATION AT A HEALTH CARE FACILITY: Primary | ICD-10-CM

## 2019-11-07 DIAGNOSIS — F41.1 GENERALIZED ANXIETY DISORDER: Chronic | ICD-10-CM

## 2019-11-07 PROCEDURE — 99214 OFFICE O/P EST MOD 30 MIN: CPT | Mod: 25 | Performed by: FAMILY MEDICINE

## 2019-11-07 PROCEDURE — 99395 PREV VISIT EST AGE 18-39: CPT | Performed by: FAMILY MEDICINE

## 2019-11-07 RX ORDER — LEVONORGESTREL AND ETHINYL ESTRADIOL 0.15-0.03
1 KIT ORAL DAILY
Qty: 90 TABLET | Refills: 3 | Status: SHIPPED | OUTPATIENT
Start: 2019-11-07 | End: 2020-11-16

## 2019-11-07 RX ORDER — ALPRAZOLAM 0.5 MG
0.5 TABLET ORAL 3 TIMES DAILY PRN
Qty: 20 TABLET | Refills: 0 | Status: SHIPPED | OUTPATIENT
Start: 2019-11-07 | End: 2020-11-16

## 2019-11-07 RX ORDER — LEVOTHYROXINE SODIUM 175 UG/1
175 TABLET ORAL DAILY
Qty: 90 TABLET | Refills: 3 | Status: SHIPPED | OUTPATIENT
Start: 2019-11-07 | End: 2020-11-16

## 2019-11-07 RX ORDER — TRAZODONE HYDROCHLORIDE 50 MG/1
TABLET, FILM COATED ORAL
Qty: 180 TABLET | Refills: 3 | Status: SHIPPED | OUTPATIENT
Start: 2019-11-07 | End: 2020-11-16

## 2019-11-07 RX ORDER — BUSPIRONE HYDROCHLORIDE 15 MG/1
15 TABLET ORAL 2 TIMES DAILY
Qty: 180 TABLET | Refills: 3 | Status: SHIPPED | OUTPATIENT
Start: 2019-11-07 | End: 2020-11-16

## 2019-11-07 RX ORDER — BUPROPION HYDROCHLORIDE 150 MG/1
TABLET ORAL
Qty: 90 TABLET | Refills: 3 | Status: SHIPPED | OUTPATIENT
Start: 2019-11-07 | End: 2020-05-12

## 2019-11-07 ASSESSMENT — MIFFLIN-ST. JEOR: SCORE: 1910.01

## 2019-11-07 NOTE — PROGRESS NOTES
SUBJECTIVE:   CC: Vida Whitfield is an 39 year old woman who presents for preventive health visit.   Chief Complaint   Patient presents with          Recheck Medication     Refills of Depression med and Thyroid med needed     Breast Problem     Right Breast Pain on and off     Depression and Anxiety Follow-Up    How are you doing with your depression since your last visit? No change    How are you doing with your anxiety since your last visit?  No change    Are you having other symptoms that might be associated with depression or anxiety? No    Have you had a significant life event? No     Do you have any concerns with your use of alcohol or other drugs? No      PHQ 8/28/2017 10/1/2018 11/4/2019   PHQ-9 Total Score 9 1 11   Q9: Thoughts of better off dead/self-harm past 2 weeks Not at all Not at all Not at all     JANET-7 SCORE 3/21/2017 5/5/2017 10/1/2018   Total Score - - -   Total Score 3 (minimal anxiety) - 0 (minimal anxiety)   Total Score - 12 0       Right Breast Pain    - Right Breast Pain that comes and goes x 3 weeks. No other symptoms in the breast.      Hypothyroidism Follow-up      Since last visit, patient describes the following symptoms: Weight stable, no hair loss, no skin changes, no constipation, no loose stools      Current Outpatient Medications:      ALPRAZolam (XANAX) 0.5 MG tablet, Take 1 tablet (0.5 mg) by mouth 3 times daily as needed for anxiety, Disp: 20 tablet, Rfl: 0     buPROPion (WELLBUTRIN XL) 150 MG 24 hr tablet, Take 3 pills, or 450 mg, daily., Disp: 90 tablet, Rfl: 0     busPIRone (BUSPAR) 15 MG tablet, Take 1 tablet (15 mg) by mouth 2 times daily, Disp: 180 tablet, Rfl: 3     cetirizine (ZYRTEC ALLERGY) 10 MG tablet, Take 20 mg by mouth daily., Disp: , Rfl:      clindamycin (CLINDAMAX) 1 % lotion, Apply twice daily on face., Disp: 60 mL, Rfl: 11     diclofenac (VOLTAREN) 1 % GEL topical gel, Apply 2 grams to hands four times daily using enclosed dosing card., Disp: 100 g, Rfl:  "3     folic acid (FOLVITE) 1 MG tablet, Take 1 tablet (1 mg) by mouth daily, Disp: 100 tablet, Rfl: 3     levonorgestrel-ethinyl estradiol (SEASONALE) 0.15-0.03 MG per tablet, Take 1 tablet by mouth daily, Disp: 90 tablet, Rfl: 3     levothyroxine (SYNTHROID/LEVOTHROID) 175 MCG tablet, Take 1 tablet (175 mcg) by mouth daily, Disp: 30 tablet, Rfl: 0     methotrexate sodium 2.5 MG TABS, Take 6 tablets (15 mg) by mouth once a week . Take all 6 tablets on the same day of each week., Disp: 72 tablet, Rfl: 2     Multiple Vitamin (MULTIVITAMIN) per tablet, Take 1 tablet by mouth daily., Disp: 100 tablet, Rfl: 12     order for DME, Equipment being ordered: CPAP 6-10 cm, Disp: , Rfl:      sulfaSALAzine ER (AZULFIDINE EN) 500 MG EC tablet, Take 3 tablets (1,500 mg) by mouth 2 times daily, Disp: 540 tablet, Rfl: 2     traZODone (DESYREL) 50 MG tablet, Take 1-2 tablets by mouth at bedtime., Disp: 180 tablet, Rfl: 3    Patient Active Problem List   Diagnosis     Contraceptive management     Hyperhidrosis of axilla     CARDIOVASCULAR SCREENING; LDL GOAL LESS THAN 160     Generalized anxiety disorder     Moderate recurrent major depression (H)     Chronic idiopathic urticaria     Hyperlipidemia with target LDL less than 130     Hypothyroidism, unspecified hypothyroidism type     Rosacea     Non morbid obesity due to excess calories     BRIDGETTE (obstructive sleep apnea)     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 factor (H)     High risk medication use       Blood pressure 110/82, pulse 91, temperature 98.1  F (36.7  C), temperature source Tympanic, resp. rate 16, height 1.784 m (5' 10.25\"), weight 114.6 kg (252 lb 9.6 oz), last menstrual period 09/30/2019, SpO2 99 %, not currently breastfeeding.    Exam:  GENERAL APPEARANCE: healthy, alert and no distress  EYES: EOMI,  PERRL  HENT: ear canals and TM's normal and nose and mouth without ulcers or lesions  NECK: no adenopathy, no asymmetry, masses, or scars and " thyroid normal to palpation  RESP: lungs clear to auscultation - no rales, rhonchi or wheezes  BREAST: normal on the right without masses, tenderness or nipple discharge and no palpable axillary masses or adenopathy  CV: regular rates and rhythm, normal S1 S2, no S3 or S4 and no murmur, click or rub -  ABDOMEN:  soft, nontender, no HSM or masses and bowel sounds normal  MS: tender to palpation on the right costochondral junctions.   SKIN: no suspicious lesions or rashes  PSYCH: mentation appears normal and affect normal/bright        (F41.1) Generalized anxiety disorder  Comment:   Plan: ALPRAZolam (XANAX) 0.5 MG tablet        We discussed the issues and avoid caffeine and other stimulants.   Exercise daily. Use the Xanax cautiously and the written Rx for #20 is done today.     (F33.1) Moderate recurrent major depression (H)  Comment:   Plan: buPROPion (WELLBUTRIN XL) 150 MG 24 hr tablet,         busPIRone (BUSPAR) 15 MG tablet, traZODone         (DESYREL) 50 MG tablet        You are doing well and stay on the meds and the non drug therapies.     (E03.9) Hypothyroidism, unspecified type  Comment:   Plan: levothyroxine (SYNTHROID/LEVOTHROID) 175 MCG         tablet, **TSH with free T4 reflex FUTURE         anytime        The lab is normal and the med is refilled for one year.   Monitor for the symptoms of high and low thyroid.   If doing well then recheck annually.     (M94.0) Costochondritis  Comment:   Plan: we discussed the inflammation. Modify activities and use the NSAIDs such as advil as needed.       Fco Medeiros MD  [facility]

## 2019-11-07 NOTE — PATIENT INSTRUCTIONS
Preventive Health Recommendations  Female Ages 26 - 39  Yearly exam:   See your health care provider every year in order to    Review health changes.     Discuss preventive care.      Review your medicines if you your doctor has prescribed any.    Until age 30: Get a Pap test every three years (more often if you have had an abnormal result).    After age 30: Talk to your doctor about whether you should have a Pap test every 3 years or have a Pap test with HPV screening every 5 years.   You do not need a Pap test if your uterus was removed (hysterectomy) and you have not had cancer.  You should be tested each year for STDs (sexually transmitted diseases), if you're at risk.   Talk to your provider about how often to have your cholesterol checked.  If you are at risk for diabetes, you should have a diabetes test (fasting glucose).  Shots: Get a flu shot each year. Get a tetanus shot every 10 years.   Nutrition:     Eat at least 5 servings of fruits and vegetables each day.    Eat whole-grain bread, whole-wheat pasta and brown rice instead of white grains and rice.    Get adequate Calcium and Vitamin D.     Lifestyle    Exercise at least 150 minutes a week (30 minutes a day, 5 days of the week). This will help you control your weight and prevent disease.    Limit alcohol to one drink per day.    No smoking.     Wear sunscreen to prevent skin cancer.    See your dentist every six months for an exam and cleaning.        Thank you for choosing [facility].  You may be receiving an email and/or telephone survey request from [facility] Customer Experience regarding your visit today.  Please take a few minutes to respond to the survey to let us know how we are doing.      If you have questions or concerns, please contact us via [employer] or you can contact your care team at [phone].    Our Clinic hours are:  Monday 6:40 am  to 7:00 pm  Tuesday -Friday 6:40 am to 5:00 pm    The [place] outpatient lab hours  are:  Monday - Friday 6:10 am to 4:45 pm  Saturdays 7:00 am to 11:00 am  Appointments are required, call [phone]    If you have clinical questions after hours or would like to schedule an appointment,  call the clinic at [phone].    (F41.1) Generalized anxiety disorder  Comment:   Plan: ALPRAZolam (XANAX) 0.5 MG tablet        We discussed the issues and avoid caffeine and other stimulants.   Exercise daily. Use the Xanax cautiously and the written Rx for #20 is done today.     (F33.1) Moderate recurrent major depression (H)  Comment:   Plan: buPROPion (WELLBUTRIN XL) 150 MG 24 hr tablet,         busPIRone (BUSPAR) 15 MG tablet, traZODone         (DESYREL) 50 MG tablet        You are doing well and stay on the meds and the non drug therapies.     (E03.9) Hypothyroidism, unspecified type  Comment:   Plan: levothyroxine (SYNTHROID/LEVOTHROID) 175 MCG         tablet, **TSH with free T4 reflex FUTURE         anytime        The lab is normal and the med is refilled for one year.   Monitor for the symptoms of high and low thyroid.   If doing well then recheck annually.     (M94.0) Costochondritis  Comment:   Plan: we discussed the inflammation. Modify activities and use the NSAIDs such as advil as needed.

## 2019-12-17 DIAGNOSIS — M06.09 RHEUMATOID ARTHRITIS OF MULTIPLE SITES WITH NEGATIVE RHEUMATOID FACTOR (H): ICD-10-CM

## 2019-12-17 LAB
ALBUMIN SERPL-MCNC: 3.6 G/DL (ref 3.4–5)
ALP SERPL-CCNC: 46 U/L (ref 40–150)
ALT SERPL W P-5'-P-CCNC: 39 U/L (ref 0–50)
AST SERPL W P-5'-P-CCNC: 24 U/L (ref 0–45)
BASOPHILS # BLD AUTO: 0 10E9/L (ref 0–0.2)
BASOPHILS NFR BLD AUTO: 0.6 %
BILIRUB DIRECT SERPL-MCNC: 0.1 MG/DL (ref 0–0.2)
BILIRUB SERPL-MCNC: 0.4 MG/DL (ref 0.2–1.3)
CREAT SERPL-MCNC: 0.89 MG/DL (ref 0.52–1.04)
CRP SERPL-MCNC: 7.3 MG/L (ref 0–8)
DIFFERENTIAL METHOD BLD: NORMAL
EOSINOPHIL # BLD AUTO: 0 10E9/L (ref 0–0.7)
EOSINOPHIL NFR BLD AUTO: 0.2 %
ERYTHROCYTE [DISTWIDTH] IN BLOOD BY AUTOMATED COUNT: 13 % (ref 10–15)
ERYTHROCYTE [SEDIMENTATION RATE] IN BLOOD BY WESTERGREN METHOD: 7 MM/H (ref 0–20)
GFR SERPL CREATININE-BSD FRML MDRD: 82 ML/MIN/{1.73_M2}
HCT VFR BLD AUTO: 39 % (ref 35–47)
HGB BLD-MCNC: 13.4 G/DL (ref 11.7–15.7)
LYMPHOCYTES # BLD AUTO: 2.7 10E9/L (ref 0.8–5.3)
LYMPHOCYTES NFR BLD AUTO: 42.9 %
MCH RBC QN AUTO: 32.2 PG (ref 26.5–33)
MCHC RBC AUTO-ENTMCNC: 34.4 G/DL (ref 31.5–36.5)
MCV RBC AUTO: 94 FL (ref 78–100)
MONOCYTES # BLD AUTO: 0.7 10E9/L (ref 0–1.3)
MONOCYTES NFR BLD AUTO: 11.3 %
NEUTROPHILS # BLD AUTO: 2.8 10E9/L (ref 1.6–8.3)
NEUTROPHILS NFR BLD AUTO: 45 %
PLATELET # BLD AUTO: 169 10E9/L (ref 150–450)
PROT SERPL-MCNC: 7.1 G/DL (ref 6.8–8.8)
RBC # BLD AUTO: 4.16 10E12/L (ref 3.8–5.2)
WBC # BLD AUTO: 6.3 10E9/L (ref 4–11)

## 2019-12-17 PROCEDURE — 82565 ASSAY OF CREATININE: CPT | Performed by: INTERNAL MEDICINE

## 2019-12-17 PROCEDURE — 85025 COMPLETE CBC W/AUTO DIFF WBC: CPT | Performed by: INTERNAL MEDICINE

## 2019-12-17 PROCEDURE — 86140 C-REACTIVE PROTEIN: CPT | Performed by: INTERNAL MEDICINE

## 2019-12-17 PROCEDURE — 36415 COLL VENOUS BLD VENIPUNCTURE: CPT | Performed by: INTERNAL MEDICINE

## 2019-12-17 PROCEDURE — 80076 HEPATIC FUNCTION PANEL: CPT | Performed by: INTERNAL MEDICINE

## 2019-12-17 PROCEDURE — 85652 RBC SED RATE AUTOMATED: CPT | Performed by: INTERNAL MEDICINE

## 2019-12-20 ENCOUNTER — OFFICE VISIT (OUTPATIENT)
Dept: RHEUMATOLOGY | Facility: CLINIC | Age: 39
End: 2019-12-20
Payer: COMMERCIAL

## 2019-12-20 VITALS
DIASTOLIC BLOOD PRESSURE: 82 MMHG | BODY MASS INDEX: 36.54 KG/M2 | WEIGHT: 255.2 LBS | HEIGHT: 70 IN | OXYGEN SATURATION: 98 % | HEART RATE: 88 BPM | SYSTOLIC BLOOD PRESSURE: 129 MMHG

## 2019-12-20 DIAGNOSIS — Z79.899 HIGH RISK MEDICATION USE: ICD-10-CM

## 2019-12-20 DIAGNOSIS — M22.2X1 BILATERAL PATELLOFEMORAL SYNDROME: ICD-10-CM

## 2019-12-20 DIAGNOSIS — M06.09 RHEUMATOID ARTHRITIS OF MULTIPLE SITES WITH NEGATIVE RHEUMATOID FACTOR (H): Primary | ICD-10-CM

## 2019-12-20 DIAGNOSIS — M22.2X2 BILATERAL PATELLOFEMORAL SYNDROME: ICD-10-CM

## 2019-12-20 PROBLEM — E66.01 MORBID OBESITY (H): Status: ACTIVE | Noted: 2019-12-20

## 2019-12-20 PROCEDURE — 99213 OFFICE O/P EST LOW 20 MIN: CPT | Performed by: INTERNAL MEDICINE

## 2019-12-20 RX ORDER — FOLIC ACID 1 MG/1
1 TABLET ORAL DAILY
Qty: 100 TABLET | Refills: 3 | Status: SHIPPED | OUTPATIENT
Start: 2019-12-20 | End: 2020-06-19

## 2019-12-20 RX ORDER — METHOTREXATE 2.5 MG/1
15 TABLET ORAL WEEKLY
Qty: 72 TABLET | Refills: 2 | Status: SHIPPED | OUTPATIENT
Start: 2019-12-20 | End: 2020-06-19

## 2019-12-20 RX ORDER — SULFASALAZINE 500 MG/1
1500 TABLET, DELAYED RELEASE ORAL 2 TIMES DAILY
Qty: 540 TABLET | Refills: 2 | Status: SHIPPED | OUTPATIENT
Start: 2019-12-20 | End: 2020-06-19

## 2019-12-20 ASSESSMENT — MIFFLIN-ST. JEOR: SCORE: 1921.8

## 2019-12-20 NOTE — NURSING NOTE
RAPID3 (0-30) Cumulative Score  7.0          RAPID3 Weighted Score (divide #4 by 3 and that is the weighted score)  2.3     Nicolette Sarmiento [facility]  12/20/2019 9:08 AM

## 2019-12-20 NOTE — PROGRESS NOTES
Rheumatology Clinic Visit      Vida Whitfield MRN# [id number]   YOB: 1980 Age: 39 year old      Date of visit: 12/20/19   PCP: Dr. Fco Medeiros    Chief Complaint   Patient presents with:  Arthritis: RA. Right knee is bothering her    Assessment and Plan     1. Seronegative nonerosive rheumatoid arthritis: Currently on methotrexate 15 mg once weekly (Cr elevation possibly secondary to MTX so MTX was reduced with improvement of Cr), folic acid 1 mg daily, and SSZ 1500mg BID.  Doing well today and will maintain on his current medication regimen.    - Continue methotrexate 15mg once weekly  - Continue folic acid 1 mg daily  - Continue sulfasalazine to 1500mg BID   - Avoid oral NSAIDs because they are historically triggers for urticaria; tolerates voltaren gel  - Labs in 3 months: CBC, Creatinine, Hepatic Panel  - Labs in 6 months: CBC, Creatinine, Hepatic Panel, ESR, CRP      # On birth control per patient    2. Bilateral patellofemoral syndrome: Previous steroid injections have been effective.  Only mild right knee symptoms today.  Encouraged physical therapy and weight loss.  I printed off exercises for her knees from the [employer] and re-referred her to physical therapy.  She is also going to try using [employer].    3. Urticaria: Historically, oral NSAIDs are triggers.  Documented here for clinical significance only.  Not managed in this clinic.    4. Bone health: 9/20/2016 Vitamin D level normal.     Ms. Whitfield verbalized agreement with and understanding of the rational for the diagnosis and treatment plan.  All questions were answered to best of my ability and the patient's satisfaction. Ms. Whitfield was advised to contact the clinic with any questions that may arise after the clinic visit.      Thank you for involving me in the care of the patient    Return to clinic: 6 months; sooner if needed    HPI   Vida Whitfield is a 39 year old female with a medical history  significant for anxiety, depression, chronic idiopathic urticaria, hyperlipidemia, hypothyroidism, rosacea, obstructive sleep apnea, obesity, and inflammatory arthritis who presents for follow-up of rheumatoid arthritis.    Historical records in this paragraph: She was previously evaluated by Dr. Blaine Antohny at [facility].  2014 labs show negative: hepatitis C ab, HBV surface ag, HBV core ab, HLA-B27, Sm, RNP, SSA, SSB, Scl-70, DNA, cardiolipin, CCP, PR3, MPO. NIKKIE 1:160 homogeneous.  MPO also positive (one negative and one positive value). Per a 3/11/2016 clinic note, she has seronegative rheumatoid arthritis and chronic urticaria. She developed joint swelling and stiffness in the bilateral MCPs, MTPs, ankles, and knees that started in November 2013 shortly after tapering off prednisone that was used to manage chronic urticaria. No history of psoriasis or inflammatory bowel disease. Negative SI joint x-rays. Negative MRI of the SI joint. Flaring of her urticaria with NSAIDs. Steroid responsive in regard to her joints. Near resolution of joint stiffness and pain with methotrexate. NSAIDs are avoided because they cause flares of her urticaria.    Today, Ms. Whitfield reports she is doing well.  Mild right knee symptoms that she does not think needs a steroid injection.  Morning stiffness for no more than 20 minutes.  Tolerating medications well.  Has not been doing exercises for her knees on a regular basis.  Gaining weight.     Denies fevers, chills, nausea, vomiting, constipation, diarrhea. No abdominal pain. No chest pain/pressure, palpitations, or shortness of breath.  No neck pain. No oral or nasal sores.    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 Gaining weight.  SKIN: No itching, rashes, sores recently; hx of urticaria  HEENT: No oral or nasal  ulcers.  CV: No chest pain, pressure, palpitations, or dyspnea on exertion.  PULM: No SOB, wheeze, cough.  GI: No nausea, vomiting, constipation, diarrhea. No blood in stool. No abdominal pain.  : No blood in urine.  MSK: See HPI.  NEURO: Negative  PSYCH: Negative    Active Problem List     Patient Active Problem List   Diagnosis     Contraceptive management     Hyperhidrosis of axilla     CARDIOVASCULAR SCREENING; LDL GOAL LESS THAN 160     Generalized anxiety disorder     Moderate recurrent major depression (H)     Chronic idiopathic urticaria     Hyperlipidemia with target LDL less than 130     Hypothyroidism, unspecified hypothyroidism type     Rosacea     Non morbid obesity due to excess calories     BRIDGETTE (obstructive sleep apnea)     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 factor (H)     High risk medication use     Past Medical History     Past Medical History:   Diagnosis Date     Arthritis 01/01/2013     Depressive disorder 01/01/2003     Peripheral autonomic neuropathy in disorders classified elsewhere(337.1)      Pure hypercholesterolemia      Thyroid disease 01/01/1995     Past Surgical History     Past Surgical History:   Procedure Laterality Date     C APPENDECTOMY  2002     CHOLECYSTECTOMY, LAPOROSCOPIC  2006    Cholecystectomy, Laparoscopic     SURGICAL HISTORY OF -   17 years old    Thyroid ablation     Allergy     Allergies   Allergen Reactions     Macrobid [Nitrofuran Derivatives]      Paxil [Paroxetine]      Anxiety         Prednisone      Vomiting      Zoloft      Mood changes     Current Medication List     Current Outpatient Medications   Medication Sig     ALPRAZolam (XANAX) 0.5 MG tablet Take 1 tablet (0.5 mg) by mouth 3 times daily as needed for anxiety     buPROPion (WELLBUTRIN XL) 150 MG 24 hr tablet Take 3 pills, or 450 mg, daily.     busPIRone (BUSPAR) 15 MG tablet Take 1 tablet (15 mg) by mouth 2 times daily     cetirizine (ZYRTEC ALLERGY) 10 MG tablet Take 20  mg by mouth daily.     clindamycin (CLINDAMAX) 1 % lotion Apply twice daily on face.     diclofenac (VOLTAREN) 1 % GEL topical gel Apply 2 grams to hands four times daily using enclosed dosing card.     folic acid (FOLVITE) 1 MG tablet Take 1 tablet (1 mg) by mouth daily     levonorgestrel-ethinyl estradiol (SEASONALE) 0.15-0.03 MG tablet Take 1 tablet by mouth daily     levothyroxine (SYNTHROID/LEVOTHROID) 175 MCG tablet Take 1 tablet (175 mcg) by mouth daily     methotrexate sodium 2.5 MG TABS Take 6 tablets (15 mg) by mouth once a week . Take all 6 tablets on the same day of each week.     Multiple Vitamin (MULTIVITAMIN) per tablet Take 1 tablet by mouth daily.     order for DME Equipment being ordered: CPAP 6-10 cm     sulfaSALAzine ER (AZULFIDINE EN) 500 MG EC tablet Take 3 tablets (1,500 mg) by mouth 2 times daily     traZODone (DESYREL) 50 MG tablet Take 1-2 tablets by mouth at bedtime.     No current facility-administered medications for this visit.          Social History   See HPI    Family History     Family History   Problem Relation Age of Onset     Diabetes Mother      Hypertension Mother      Lipids Mother      Hyperlipidemia Mother      Hypertension Father      Lipids Father      Thyroid Disease Father      Cancer Father      Hyperlipidemia Father      Other Cancer Father         Gastric & Bone Cancer     Diabetes Maternal Grandmother      Diabetes Paternal Grandmother      Melanoma No family hx of        Physical Exam     Temp Readings from Last 3 Encounters:   11/07/19 98.1  F (36.7  C) (Tympanic)   12/14/18 97.6  F (36.4  C) (Oral)   10/05/18 97.3  F (36.3  C) (Tympanic)     BP Readings from Last 5 Encounters:   12/20/19 129/82   11/07/19 110/82   06/14/19 131/88   12/14/18 127/82   10/05/18 122/76     Pulse Readings from Last 1 Encounters:   12/20/19 88     Resp Readings from Last 1 Encounters:   11/07/19 16     Estimated body mass index is 36.36 kg/m  as calculated from the following:    Height  "as of this encounter: 1.784 m (5' 10.25\").    Weight as of this encounter: 115.8 kg (255 lb 3.2 oz).    GEN: NAD  HEENT: MMM.  Anicteric, noninjected sclera  CV: S1, S2. RRR. No m/r/g.  PULM: CTA bilaterally. No w/c.  MSK:  MCPs, PIPs, DIPs, wrists, elbows, shoulders, ankles, and feet without swelling or tenderness to palpation. Negative MCP squeeze. Negative MTP squeeze. Hips non tender to direct palpation.  Knees with mild crepitation and mild medial joint line tenderness but no effusion or increased warmth.  SKIN: No rash  EXT: No LE edema  PSYCH: Alert. Appropriate.    Labs / Imaging (select studies)   RF/CCP  No results for input(s): CCPABY, CCPIGG, CYCLICCITPEP, RHF in the last 11254 hours.  CBC  Recent Labs   Lab Test 12/17/19  1640 10/13/19  1140 06/11/19  0724   WBC 6.3 7.1 6.9   RBC 4.16 4.35 4.07   HGB 13.4 14.0 13.3   HCT 39.0 41.4 38.5   MCV 94 95 95   RDW 13.0 13.2 13.1    189 166   MCH 32.2 32.2 32.7   MCHC 34.4 33.8 34.5   NEUTROPHIL 45.0 55.7 44.9   LYMPH 42.9 33.3 41.9   MONOCYTE 11.3 8.9 10.9   EOSINOPHIL 0.2 1.3 1.6   BASOPHIL 0.6 0.8 0.7   ANEU 2.8 3.9 3.1   ALYM 2.7 2.4 2.9   FREDA 0.7 0.6 0.8   AEOS 0.0 0.1 0.1   ABAS 0.0 0.1 0.1     CMP  Recent Labs   Lab Test 12/17/19  1640 10/19/19  1041 10/13/19  1140 06/11/19  0724  09/23/18  1058  12/20/16  1138  01/26/12 2015   NA  --   --   --   --   --   --   --  138  --  140   POTASSIUM  --   --   --   --   --   --   --  4.4  --  4.0   CHLORIDE  --   --   --   --   --   --   --  104  --  104   CO2  --   --   --   --   --   --   --  30  --  26   ANIONGAP  --   --   --   --   --   --   --  4  --  10   GLC  --  81  --   --   --  79  --  84   < > 90   BUN  --   --   --   --   --   --   --  13  --  10   CR 0.89  --  0.97 1.00   < >  --    < > 1.05*   < > 0.79   GFRESTIMATED 82  --  74 71   < >  --    < > 59*   < > 85   GFRESTBLACK >90  --  85 83   < >  --    < > 72   < > >90   TATI  --   --   --   --   --   --   --  8.7  --  8.9   BILITOTAL 0.4  --  " 0.5 0.6   < >  --    < >  --    < > 0.4   ALBUMIN 3.6  --  3.7 3.5   < >  --    < >  --    < > 4.1   PROTTOTAL 7.1  --  7.2 6.5*   < >  --    < >  --    < > 7.5   ALKPHOS 46  --  43 52   < >  --    < >  --    < > 56   AST 24  --  17 23   < >  --    < >  --    < > 27   ALT 39  --  27 27   < >  --    < >  --    < > 21    < > = values in this interval not displayed.     Calcium/VitaminD  Recent Labs   Lab Test 12/20/16  1138 09/20/16  1554 01/26/12 2015   TATI 8.7  --  8.9   VITDT  --  36  --      ESR/CRP  Recent Labs   Lab Test 12/17/19  1640 06/11/19  0724 12/10/18  1900   SED 7 5 6   CRP 7.3 8.0 7.3       From care everywhere:      Immunization History     Immunization History   Administered Date(s) Administered     HepB 07/22/1999     Historical DTP/aP 1980, 1980, 1980, 11/02/1982     Influenza (IIV3) PF 11/05/1996, 10/31/2019     MMR 09/07/1992     OPV, trivalent, live 1981, 1981, 1981, 11/02/1982     Pneumo Conj 13-V (2010&after) 09/20/2016     Pneumococcal 23 valent 12/20/2016     TD (ADULT, 7+) 07/14/1995, 01/17/2006     Tdap (Adacel,Boostrix) 12/10/2013            Chart documentation done in part with Dragon Voice recognition Software. Although reviewed after completion, some word and grammatical error may remain.    Andrea Benítez MD

## 2019-12-20 NOTE — PATIENT INSTRUCTIONS
Rheumatology    Dr. Andrea Benítez       [facility] [place]   (Monday)  [address]  [place] [address]       M [facility] [place]   (Tuesday)  [address] [place] [address]    [facility] [place]   (Wed., Thurs., and Friday)  [address] [place] [address]    Phone number: [phone]  Thank you for choosing [facility].  Nicolette Sarmiento CMA

## 2020-02-10 ENCOUNTER — HEALTH MAINTENANCE LETTER (OUTPATIENT)
Age: 40
End: 2020-02-10

## 2020-03-20 DIAGNOSIS — M06.09 RHEUMATOID ARTHRITIS OF MULTIPLE SITES WITH NEGATIVE RHEUMATOID FACTOR (H): ICD-10-CM

## 2020-03-20 RX ORDER — METHOTREXATE 2.5 MG/1
15 TABLET ORAL WEEKLY
Qty: 72 TABLET | Refills: 2 | Status: CANCELLED | OUTPATIENT
Start: 2020-03-20

## 2020-03-20 NOTE — TELEPHONE ENCOUNTER
Based on last prescription quantity, patient should still have a couple refills .  Sent patient a [employer] message advising her to contact her pharmacy for a refill of methotrexate.    Hemanth Washington RN....3/20/2020 1:31 PM

## 2020-06-16 DIAGNOSIS — M06.09 RHEUMATOID ARTHRITIS OF MULTIPLE SITES WITH NEGATIVE RHEUMATOID FACTOR (H): ICD-10-CM

## 2020-06-16 DIAGNOSIS — Z79.899 HIGH RISK MEDICATION USE: ICD-10-CM

## 2020-06-16 LAB
ALBUMIN SERPL-MCNC: 3.4 G/DL (ref 3.4–5)
ALP SERPL-CCNC: 52 U/L (ref 40–150)
ALT SERPL W P-5'-P-CCNC: 35 U/L (ref 0–50)
AST SERPL W P-5'-P-CCNC: 23 U/L (ref 0–45)
BASOPHILS # BLD AUTO: 0.1 10E9/L (ref 0–0.2)
BASOPHILS NFR BLD AUTO: 1 %
BILIRUB DIRECT SERPL-MCNC: 0.1 MG/DL (ref 0–0.2)
BILIRUB SERPL-MCNC: 0.3 MG/DL (ref 0.2–1.3)
CREAT SERPL-MCNC: 0.9 MG/DL (ref 0.52–1.04)
CRP SERPL-MCNC: 11 MG/L (ref 0–8)
DIFFERENTIAL METHOD BLD: NORMAL
EOSINOPHIL # BLD AUTO: 0 10E9/L (ref 0–0.7)
EOSINOPHIL NFR BLD AUTO: 0.7 %
ERYTHROCYTE [DISTWIDTH] IN BLOOD BY AUTOMATED COUNT: 14.2 % (ref 10–15)
ERYTHROCYTE [SEDIMENTATION RATE] IN BLOOD BY WESTERGREN METHOD: 7 MM/H (ref 0–20)
GFR SERPL CREATININE-BSD FRML MDRD: 81 ML/MIN/{1.73_M2}
HCT VFR BLD AUTO: 37.9 % (ref 35–47)
HGB BLD-MCNC: 13.1 G/DL (ref 11.7–15.7)
LYMPHOCYTES # BLD AUTO: 2.9 10E9/L (ref 0.8–5.3)
LYMPHOCYTES NFR BLD AUTO: 48 %
MCH RBC QN AUTO: 31.6 PG (ref 26.5–33)
MCHC RBC AUTO-ENTMCNC: 34.6 G/DL (ref 31.5–36.5)
MCV RBC AUTO: 91 FL (ref 78–100)
MONOCYTES # BLD AUTO: 0.8 10E9/L (ref 0–1.3)
MONOCYTES NFR BLD AUTO: 12.3 %
NEUTROPHILS # BLD AUTO: 2.3 10E9/L (ref 1.6–8.3)
NEUTROPHILS NFR BLD AUTO: 38 %
PLATELET # BLD AUTO: 173 10E9/L (ref 150–450)
PROT SERPL-MCNC: 7 G/DL (ref 6.8–8.8)
RBC # BLD AUTO: 4.15 10E12/L (ref 3.8–5.2)
WBC # BLD AUTO: 6.1 10E9/L (ref 4–11)

## 2020-06-16 PROCEDURE — 80076 HEPATIC FUNCTION PANEL: CPT | Performed by: INTERNAL MEDICINE

## 2020-06-16 PROCEDURE — 85025 COMPLETE CBC W/AUTO DIFF WBC: CPT | Performed by: INTERNAL MEDICINE

## 2020-06-16 PROCEDURE — 82565 ASSAY OF CREATININE: CPT | Performed by: INTERNAL MEDICINE

## 2020-06-16 PROCEDURE — 85652 RBC SED RATE AUTOMATED: CPT | Performed by: INTERNAL MEDICINE

## 2020-06-16 PROCEDURE — 36415 COLL VENOUS BLD VENIPUNCTURE: CPT | Performed by: INTERNAL MEDICINE

## 2020-06-16 PROCEDURE — 86140 C-REACTIVE PROTEIN: CPT | Performed by: INTERNAL MEDICINE

## 2020-06-19 ENCOUNTER — VIRTUAL VISIT (OUTPATIENT)
Dept: RHEUMATOLOGY | Facility: CLINIC | Age: 40
End: 2020-06-19

## 2020-06-19 DIAGNOSIS — Z79.899 HIGH RISK MEDICATION USE: ICD-10-CM

## 2020-06-19 DIAGNOSIS — M06.09 RHEUMATOID ARTHRITIS OF MULTIPLE SITES WITH NEGATIVE RHEUMATOID FACTOR (H): Primary | ICD-10-CM

## 2020-06-19 PROCEDURE — 99213 OFFICE O/P EST LOW 20 MIN: CPT | Mod: GT | Performed by: INTERNAL MEDICINE

## 2020-06-19 RX ORDER — SULFASALAZINE 500 MG/1
1500 TABLET, DELAYED RELEASE ORAL 2 TIMES DAILY
Qty: 540 TABLET | Refills: 2 | Status: SHIPPED | OUTPATIENT
Start: 2020-06-19 | End: 2020-12-18

## 2020-06-19 RX ORDER — FOLIC ACID 1 MG/1
1 TABLET ORAL DAILY
Qty: 100 TABLET | Refills: 2 | Status: SHIPPED | OUTPATIENT
Start: 2020-06-19 | End: 2020-12-18

## 2020-06-19 RX ORDER — METHOTREXATE 2.5 MG/1
15 TABLET ORAL WEEKLY
Qty: 72 TABLET | Refills: 2 | Status: SHIPPED | OUTPATIENT
Start: 2020-06-19 | End: 2020-12-18

## 2020-06-19 NOTE — PROGRESS NOTES
"Vida Whitfield is a 40 year old female who is being evaluated via a billable video visit.      The patient has been notified of following:     \"This video visit will be conducted via a call between you and your physician/provider. We have found that certain health care needs can be provided without the need for an in-person physical exam.  This service lets us provide the care you need with a video conversation.  If a prescription is necessary we can send it directly to your pharmacy.  If lab work is needed we can place an order for that and you can then stop by our lab to have the test done at a later time.    Video visits are billed at different rates depending on your insurance coverage.  Please reach out to your insurance provider with any questions.    If during the course of the call the physician/provider feels a video visit is not appropriate, you will not be charged for this service.\"    Patient has given verbal consent for Video visit? Yes    Will anyone else be joining your video visit? No    Rheumatology Video Visit      Vida Whitfield MRN# [id number]   YOB: 1980 Age: 40 year old      Date of visit: 6/19/20   PCP: Dr. Fco Medeiros    Chief Complaint   Patient presents with:  RECHECK: RA    Assessment and Plan     1. Seronegative nonerosive rheumatoid arthritis: Currently on methotrexate 15 mg once weekly (Cr elevation possibly secondary to MTX so MTX was reduced with improvement of Cr), folic acid 1 mg daily, and SSZ 1500mg BID.  Doing well today and will maintain on his current medication regimen.    - Continue methotrexate 15mg once weekly  - Continue folic acid 1 mg daily  - Continue sulfasalazine to 1500mg BID   - Avoid oral NSAIDs because they are historically triggers for urticaria; tolerates voltaren gel  - Labs in 3 months: CBC, Creatinine, Hepatic Panel  - Labs in 6 months: CBC, Creatinine, Hepatic Panel, ESR, CRP      # On birth control per patient    2. Bilateral " patellofemoral syndrome: Previous steroid injections have been effective.  Doing exercises and trying to lose weight; doing well today.     3. Urticaria: Historically, oral NSAIDs are triggers.  Documented here for clinical significance only.  Not managed in this clinic.    4. Bone health: 9/20/2016 Vitamin D level normal.     # Relevant labs and imaging were reviewed with the patient    # High risk medication toxicity monitoring: discussion and labs reviewed; appropriate labs ordered. See above.  Instructed that if confirmed to have COVID-19 or exposure to someone with confirmed COVID-19 to call this clinic for directions on DMARD management.    # Note that this is a virtual visit to reduce the risk of COVID-19 exposure during this current pandemic.      # Considered to be at high risk of complications from the COVID-19 virus.  It is recommended to limit contact with other people and if possible to work remotely or provide a leave of absence to reduce the risk for COVID-19.      Ms. Whitfield verbalized agreement with and understanding of the rational for the diagnosis and treatment plan.  All questions were answered to best of my ability and the patient's satisfaction. Ms. Whitfield was advised to contact the clinic with any questions that may arise after the clinic visit.      Thank you for involving me in the care of the patient    Return to clinic: 6 months; sooner if needed    HPI   Vida Whitfield is a 40 year old female with a medical history significant for anxiety, depression, chronic idiopathic urticaria, hyperlipidemia, hypothyroidism, rosacea, obstructive sleep apnea, obesity, and inflammatory arthritis who presents for follow-up of rheumatoid arthritis.    Historical records in this paragraph: She was previously evaluated by Dr. Blaine Anthony at [facility].  2014 labs show negative: hepatitis C ab, HBV surface ag, HBV core ab, HLA-B27, Sm, RNP, SSA, SSB, Scl-70, DNA, cardiolipin, CCP, PR3, MPO. NIKKIE 1:160  homogeneous.  MPO also positive (one negative and one positive value). Per a 3/11/2016 clinic note, she has seronegative rheumatoid arthritis and chronic urticaria. She developed joint swelling and stiffness in the bilateral MCPs, MTPs, ankles, and knees that started in November 2013 shortly after tapering off prednisone that was used to manage chronic urticaria. No history of psoriasis or inflammatory bowel disease. Negative SI joint x-rays. Negative MRI of the SI joint. Flaring of her urticaria with NSAIDs. Steroid responsive in regard to her joints. Near resolution of joint stiffness and pain with methotrexate. NSAIDs are avoided because they cause flares of her urticaria.    Today, Ms. Whitfield reports she is doing well.  No knee pain; doing home PT exercises for her knees and trying to lose weight.  Morning stiffness for <20 min. No gelling. Tolerating medications well.   Notes that she ran out of SSZ because of a backorder at the pharmacy and felt worse during the month she was off SSZ; restarted SSZ and is doing well again.     Denies fevers, chills, nausea, vomiting, constipation, diarrhea. No abdominal pain. No chest pain/pressure, palpitations, or shortness of breath.  No neck pain. No oral or nasal sores.    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 Trying to lose weight.   SKIN: No itching, rashes, sores recently; hx of urticaria  HEENT: No oral or nasal ulcers.  CV: No chest pain, pressure, palpitations, or dyspnea on exertion.  PULM: No SOB, wheeze, cough.  GI: No nausea, vomiting, constipation, diarrhea. No blood in stool. No abdominal pain.  : No blood in urine.  MSK: See HPI.  NEURO: Negative  PSYCH: Negative    Active Problem List     Patient Active Problem List   Diagnosis     Contraceptive management     Hyperhidrosis of axilla      CARDIOVASCULAR SCREENING; LDL GOAL LESS THAN 160     Generalized anxiety disorder     Moderate recurrent major depression (H)     Chronic idiopathic urticaria     Hyperlipidemia with target LDL less than 130     Hypothyroidism, unspecified hypothyroidism type     Rosacea     Non morbid obesity due to excess calories     BRIDGETTE (obstructive sleep apnea)     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 factor (H)     High risk medication use     Obesity (BMI 35.0-39.9) with comorbidity (H)     Past Medical History     Past Medical History:   Diagnosis Date     Arthritis 01/01/2013     Depressive disorder 01/01/2003     Peripheral autonomic neuropathy in disorders classified elsewhere(337.1)      Pure hypercholesterolemia      Thyroid disease 01/01/1995     Past Surgical History     Past Surgical History:   Procedure Laterality Date     C APPENDECTOMY  2002     CHOLECYSTECTOMY, LAPOROSCOPIC  2006    Cholecystectomy, Laparoscopic     SURGICAL HISTORY OF -   17 years old    Thyroid ablation     Allergy     Allergies   Allergen Reactions     Macrobid [Nitrofuran Derivatives]      Paxil [Paroxetine]      Anxiety         Prednisone      Vomiting      Zoloft      Mood changes     Current Medication List     Current Outpatient Medications   Medication Sig     ALPRAZolam (XANAX) 0.5 MG tablet Take 1 tablet (0.5 mg) by mouth 3 times daily as needed for anxiety     buPROPion (WELLBUTRIN XL) 150 MG 24 hr tablet Take 3 tablets by mouth once daily     busPIRone (BUSPAR) 15 MG tablet Take 1 tablet (15 mg) by mouth 2 times daily     cetirizine (ZYRTEC ALLERGY) 10 MG tablet Take 20 mg by mouth daily.     clindamycin (CLINDAMAX) 1 % lotion Apply twice daily on face.     diclofenac (VOLTAREN) 1 % GEL topical gel Apply 2 grams to hands four times daily using enclosed dosing card.     folic acid (FOLVITE) 1 MG tablet Take 1 tablet (1 mg) by mouth daily     levonorgestrel-ethinyl estradiol (SEASONALE) 0.15-0.03 MG tablet  "Take 1 tablet by mouth daily     levothyroxine (SYNTHROID/LEVOTHROID) 175 MCG tablet Take 1 tablet (175 mcg) by mouth daily     methotrexate sodium 2.5 MG TABS Take 6 tablets (15 mg) by mouth once a week . Take all 6 tablets on the same day of each week.     Multiple Vitamin (MULTIVITAMIN) per tablet Take 1 tablet by mouth daily.     order for DME Equipment being ordered: CPAP 6-10 cm     sulfaSALAzine ER (AZULFIDINE EN) 500 MG EC tablet Take 3 tablets (1,500 mg) by mouth 2 times daily     traZODone (DESYREL) 50 MG tablet Take 1-2 tablets by mouth at bedtime.     No current facility-administered medications for this visit.          Social History   See HPI    Family History     Family History   Problem Relation Age of Onset     Diabetes Mother      Hypertension Mother      Lipids Mother      Hyperlipidemia Mother      Hypertension Father      Lipids Father      Thyroid Disease Father      Cancer Father      Hyperlipidemia Father      Other Cancer Father         Gastric & Bone Cancer     Diabetes Maternal Grandmother      Diabetes Paternal Grandmother      Melanoma No family hx of        Physical Exam     Temp Readings from Last 3 Encounters:   11/07/19 98.1  F (36.7  C) (Tympanic)   12/14/18 97.6  F (36.4  C) (Oral)   10/05/18 97.3  F (36.3  C) (Tympanic)     BP Readings from Last 5 Encounters:   12/20/19 129/82   11/07/19 110/82   06/14/19 131/88   12/14/18 127/82   10/05/18 122/76     Pulse Readings from Last 1 Encounters:   12/20/19 88     Resp Readings from Last 1 Encounters:   11/07/19 16     Estimated body mass index is 36.36 kg/m  as calculated from the following:    Height as of 12/20/19: 1.784 m (5' 10.25\").    Weight as of 12/20/19: 115.8 kg (255 lb 3.2 oz).      GEN: NAD  HEENT: MMM.  Anicteric, noninjected sclera  PULM: No increased work of breathing  MSK:  Hands and wrists without swelling.   PSYCH: Alert. Appropriate.        Labs / Imaging (select studies)     CBC  Recent Labs   Lab Test 06/16/20  1824 " 12/17/19  1640 10/13/19  1140   WBC 6.1 6.3 7.1   RBC 4.15 4.16 4.35   HGB 13.1 13.4 14.0   HCT 37.9 39.0 41.4   MCV 91 94 95   RDW 14.2 13.0 13.2    169 189   MCH 31.6 32.2 32.2   MCHC 34.6 34.4 33.8   NEUTROPHIL 38.0 45.0 55.7   LYMPH 48.0 42.9 33.3   MONOCYTE 12.3 11.3 8.9   EOSINOPHIL 0.7 0.2 1.3   BASOPHIL 1.0 0.6 0.8   ANEU 2.3 2.8 3.9   ALYM 2.9 2.7 2.4   FREDA 0.8 0.7 0.6   AEOS 0.0 0.0 0.1   ABAS 0.1 0.0 0.1     CMP  Recent Labs   Lab Test 06/16/20  1824 12/17/19  1640 10/19/19  1041 10/13/19  1140  09/23/18  1058  12/20/16  1138   NA  --   --   --   --   --   --   --  138   POTASSIUM  --   --   --   --   --   --   --  4.4   CHLORIDE  --   --   --   --   --   --   --  104   CO2  --   --   --   --   --   --   --  30   ANIONGAP  --   --   --   --   --   --   --  4   GLC  --   --  81  --   --  79  --  84   BUN  --   --   --   --   --   --   --  13   CR 0.90 0.89  --  0.97   < >  --    < > 1.05*   GFRESTIMATED 81 82  --  74   < >  --    < > 59*   GFRESTBLACK >90 >90  --  85   < >  --    < > 72   TATI  --   --   --   --   --   --   --  8.7   BILITOTAL 0.3 0.4  --  0.5   < >  --    < >  --    ALBUMIN 3.4 3.6  --  3.7   < >  --    < >  --    PROTTOTAL 7.0 7.1  --  7.2   < >  --    < >  --    ALKPHOS 52 46  --  43   < >  --    < >  --    AST 23 24  --  17   < >  --    < >  --    ALT 35 39  --  27   < >  --    < >  --     < > = values in this interval not displayed.     Calcium/VitaminD  Recent Labs   Lab Test 12/20/16  1138 09/20/16  1554   TATI 8.7  --    VITDT  --  36     ESR/CRP  Recent Labs   Lab Test 06/16/20  1824 12/17/19  1640 06/11/19  0724   SED 7 7 5   CRP 11.0* 7.3 8.0     Lipid Panel  Recent Labs   Lab Test 10/19/19  1041 09/23/18  1058 09/02/16  0736 06/19/14  0850 06/27/13  1023   CHOL 187 166 157 159 221*   TRIG 80 91 70 113 154*   HDL 58 54 57 36* 49*   * 94 86 101 141*   VLDL  --   --   --  23 31*   CHOLHDLRATIO  --   --   --  4.0 4.0   NHDL 129 112 100  --   --        From care  everywhere:      Immunization History     Immunization History   Administered Date(s) Administered     HepB 07/22/1999     Historical DTP/aP 1980, 1980, 1980, 11/02/1982     Influenza (IIV3) PF 11/05/1996, 10/31/2019     MMR 09/07/1992     OPV, trivalent, live 1981, 1981, 1981, 11/02/1982     Pneumo Conj 13-V (2010&after) 09/20/2016     Pneumococcal 23 valent 12/20/2016     TD (ADULT, 7+) 07/14/1995, 01/17/2006     Tdap (Adacel,Boostrix) 12/10/2013            Chart documentation done in part with Dragon Voice recognition Software. Although reviewed after completion, some word and grammatical error may remain.    Video-Visit Details    Type of service:  Video Visit    Video Start Time: 10:02 AM  Video End Time: 10:08 AM    Originating Location (pt. Location): Home    Distant Location (provider location):  Home    Platform used for Video Visit: Fabiano Benítez MD

## 2020-08-10 DIAGNOSIS — F33.1 MODERATE RECURRENT MAJOR DEPRESSION (H): ICD-10-CM

## 2020-08-10 NOTE — TELEPHONE ENCOUNTER
"Requested Prescriptions   Pending Prescriptions Disp Refills     buPROPion (WELLBUTRIN XL) 150 MG 24 hr tablet 90 tablet 1     Sig: Take 3 tablets by mouth once daily       SSRIs Protocol Failed - 8/10/2020  4:25 PM        Failed - PHQ-9 score less than 5 in past 6 months     Please review last PHQ-9 score.           Failed - Recent (6 mo) or future (30 days) visit within the authorizing provider's specialty     Patient had office visit in the last 6 months or has a visit in the next 30 days with authorizing provider or within the authorizing provider's specialty.  See \"Patient Info\" tab in inbasket, or \"Choose Columns\" in Meds & Orders section of the refill encounter.            Passed - Medication is Bupropion     If the medication is Bupropion (Wellbutrin), and the patient is taking for smoking cessation; OK to refill.          Passed - Medication is active on med list        Passed - Patient is age 18 or older        Passed - No active pregnancy on record        Passed - No positive pregnancy test in last 12 months             "

## 2020-08-11 NOTE — TELEPHONE ENCOUNTER
Routing refill request to provider for review/approval because:  Last refilled by float provider  Due for PAP & Physical       PHQ 8/28/2017 10/1/2018 11/4/2019   PHQ-9 Total Score 9 1 11   Q9: Thoughts of better off dead/self-harm past 2 weeks Not at all Not at all Not at all     Lois MCFADDEN RN, BSN

## 2020-08-12 RX ORDER — BUPROPION HYDROCHLORIDE 150 MG/1
TABLET ORAL
Qty: 90 TABLET | Refills: 1 | Status: SHIPPED | OUTPATIENT
Start: 2020-08-12 | End: 2020-11-16

## 2020-10-15 DIAGNOSIS — E03.9 HYPOTHYROIDISM, UNSPECIFIED TYPE: ICD-10-CM

## 2020-10-15 DIAGNOSIS — M06.09 RHEUMATOID ARTHRITIS OF MULTIPLE SITES WITH NEGATIVE RHEUMATOID FACTOR (H): ICD-10-CM

## 2020-10-15 DIAGNOSIS — Z79.899 HIGH RISK MEDICATION USE: ICD-10-CM

## 2020-10-15 LAB
ALBUMIN SERPL-MCNC: 3.5 G/DL (ref 3.4–5)
ALP SERPL-CCNC: 45 U/L (ref 40–150)
ALT SERPL W P-5'-P-CCNC: 22 U/L (ref 0–50)
AST SERPL W P-5'-P-CCNC: 19 U/L (ref 0–45)
BASOPHILS # BLD AUTO: 0 10E9/L (ref 0–0.2)
BASOPHILS NFR BLD AUTO: 0.4 %
BILIRUB DIRECT SERPL-MCNC: 0.2 MG/DL (ref 0–0.2)
BILIRUB SERPL-MCNC: 0.4 MG/DL (ref 0.2–1.3)
CREAT SERPL-MCNC: 0.97 MG/DL (ref 0.52–1.04)
DIFFERENTIAL METHOD BLD: NORMAL
EOSINOPHIL # BLD AUTO: 0 10E9/L (ref 0–0.7)
EOSINOPHIL NFR BLD AUTO: 0.1 %
ERYTHROCYTE [DISTWIDTH] IN BLOOD BY AUTOMATED COUNT: 12.3 % (ref 10–15)
GFR SERPL CREATININE-BSD FRML MDRD: 73 ML/MIN/{1.73_M2}
HCT VFR BLD AUTO: 35.8 % (ref 35–47)
HGB BLD-MCNC: 12.2 G/DL (ref 11.7–15.7)
LYMPHOCYTES # BLD AUTO: 3.1 10E9/L (ref 0.8–5.3)
LYMPHOCYTES NFR BLD AUTO: 44.8 %
MCH RBC QN AUTO: 31.8 PG (ref 26.5–33)
MCHC RBC AUTO-ENTMCNC: 34.1 G/DL (ref 31.5–36.5)
MCV RBC AUTO: 93 FL (ref 78–100)
MONOCYTES # BLD AUTO: 0.7 10E9/L (ref 0–1.3)
MONOCYTES NFR BLD AUTO: 10 %
NEUTROPHILS # BLD AUTO: 3.1 10E9/L (ref 1.6–8.3)
NEUTROPHILS NFR BLD AUTO: 44.7 %
PLATELET # BLD AUTO: 170 10E9/L (ref 150–450)
PROT SERPL-MCNC: 7.2 G/DL (ref 6.8–8.8)
RBC # BLD AUTO: 3.84 10E12/L (ref 3.8–5.2)
T4 FREE SERPL-MCNC: 1.39 NG/DL (ref 0.76–1.46)
TSH SERPL DL<=0.005 MIU/L-ACNC: 0.37 MU/L (ref 0.4–4)
WBC # BLD AUTO: 7 10E9/L (ref 4–11)

## 2020-10-15 PROCEDURE — 80076 HEPATIC FUNCTION PANEL: CPT | Performed by: INTERNAL MEDICINE

## 2020-10-15 PROCEDURE — 36415 COLL VENOUS BLD VENIPUNCTURE: CPT | Performed by: INTERNAL MEDICINE

## 2020-10-15 PROCEDURE — 85025 COMPLETE CBC W/AUTO DIFF WBC: CPT | Performed by: INTERNAL MEDICINE

## 2020-10-15 PROCEDURE — 82565 ASSAY OF CREATININE: CPT | Performed by: INTERNAL MEDICINE

## 2020-10-15 PROCEDURE — 84443 ASSAY THYROID STIM HORMONE: CPT | Performed by: FAMILY MEDICINE

## 2020-10-15 PROCEDURE — 84439 ASSAY OF FREE THYROXINE: CPT | Performed by: FAMILY MEDICINE

## 2020-11-02 ENCOUNTER — MYC MEDICAL ADVICE (OUTPATIENT)
Dept: RHEUMATOLOGY | Facility: CLINIC | Age: 40
End: 2020-11-02

## 2020-11-16 ENCOUNTER — HEALTH MAINTENANCE LETTER (OUTPATIENT)
Age: 40
End: 2020-11-16

## 2020-11-16 ENCOUNTER — OFFICE VISIT (OUTPATIENT)
Dept: FAMILY MEDICINE | Facility: CLINIC | Age: 40
End: 2020-11-16
Payer: COMMERCIAL

## 2020-11-16 VITALS
DIASTOLIC BLOOD PRESSURE: 92 MMHG | SYSTOLIC BLOOD PRESSURE: 120 MMHG | HEART RATE: 93 BPM | TEMPERATURE: 98.3 F | WEIGHT: 254.6 LBS | RESPIRATION RATE: 16 BRPM | BODY MASS INDEX: 36.45 KG/M2 | OXYGEN SATURATION: 98 % | HEIGHT: 70 IN

## 2020-11-16 DIAGNOSIS — Z00.00 ROUTINE GENERAL MEDICAL EXAMINATION AT A HEALTH CARE FACILITY: Primary | ICD-10-CM

## 2020-11-16 DIAGNOSIS — E03.9 HYPOTHYROIDISM, UNSPECIFIED TYPE: ICD-10-CM

## 2020-11-16 DIAGNOSIS — F41.1 GENERALIZED ANXIETY DISORDER: Chronic | ICD-10-CM

## 2020-11-16 DIAGNOSIS — R23.3 BRUISING TENDENCY: ICD-10-CM

## 2020-11-16 DIAGNOSIS — F33.1 MODERATE RECURRENT MAJOR DEPRESSION (H): ICD-10-CM

## 2020-11-16 LAB
APTT PPP: 26 SEC (ref 22–37)
ERYTHROCYTE [DISTWIDTH] IN BLOOD BY AUTOMATED COUNT: 12.5 % (ref 10–15)
HCT VFR BLD AUTO: 38.1 % (ref 35–47)
HGB BLD-MCNC: 13.2 G/DL (ref 11.7–15.7)
INR PPP: 1.06 (ref 0.86–1.14)
MCH RBC QN AUTO: 32.4 PG (ref 26.5–33)
MCHC RBC AUTO-ENTMCNC: 34.6 G/DL (ref 31.5–36.5)
MCV RBC AUTO: 93 FL (ref 78–100)
PLATELET # BLD AUTO: 174 10E9/L (ref 150–450)
RBC # BLD AUTO: 4.08 10E12/L (ref 3.8–5.2)
WBC # BLD AUTO: 6.1 10E9/L (ref 4–11)

## 2020-11-16 PROCEDURE — 85730 THROMBOPLASTIN TIME PARTIAL: CPT | Performed by: FAMILY MEDICINE

## 2020-11-16 PROCEDURE — G0145 SCR C/V CYTO,THINLAYER,RESCR: HCPCS | Performed by: FAMILY MEDICINE

## 2020-11-16 PROCEDURE — 85027 COMPLETE CBC AUTOMATED: CPT | Performed by: FAMILY MEDICINE

## 2020-11-16 PROCEDURE — 36415 COLL VENOUS BLD VENIPUNCTURE: CPT | Performed by: FAMILY MEDICINE

## 2020-11-16 PROCEDURE — 85610 PROTHROMBIN TIME: CPT | Performed by: FAMILY MEDICINE

## 2020-11-16 PROCEDURE — 99213 OFFICE O/P EST LOW 20 MIN: CPT | Mod: 25 | Performed by: FAMILY MEDICINE

## 2020-11-16 PROCEDURE — 99395 PREV VISIT EST AGE 18-39: CPT | Performed by: FAMILY MEDICINE

## 2020-11-16 RX ORDER — ALPRAZOLAM 0.5 MG
0.5 TABLET ORAL 3 TIMES DAILY PRN
Qty: 20 TABLET | Refills: 0 | Status: SHIPPED | OUTPATIENT
Start: 2020-11-16

## 2020-11-16 RX ORDER — LEVONORGESTREL AND ETHINYL ESTRADIOL 0.15-0.03
1 KIT ORAL DAILY
Qty: 90 TABLET | Refills: 3 | Status: SHIPPED | OUTPATIENT
Start: 2020-11-16 | End: 2021-12-03

## 2020-11-16 RX ORDER — LEVOTHYROXINE SODIUM 175 UG/1
175 TABLET ORAL DAILY
Qty: 90 TABLET | Refills: 3 | Status: SHIPPED | OUTPATIENT
Start: 2020-11-16 | End: 2021-11-04

## 2020-11-16 RX ORDER — BUPROPION HYDROCHLORIDE 150 MG/1
TABLET ORAL
Qty: 270 TABLET | Refills: 3 | Status: SHIPPED | OUTPATIENT
Start: 2020-11-16 | End: 2021-11-15

## 2020-11-16 RX ORDER — BUSPIRONE HYDROCHLORIDE 15 MG/1
15 TABLET ORAL 2 TIMES DAILY
Qty: 180 TABLET | Refills: 3 | Status: SHIPPED | OUTPATIENT
Start: 2020-11-16 | End: 2021-11-10

## 2020-11-16 RX ORDER — TRAZODONE HYDROCHLORIDE 50 MG/1
TABLET, FILM COATED ORAL
Qty: 180 TABLET | Refills: 3 | Status: SHIPPED | OUTPATIENT
Start: 2020-11-16 | End: 2022-01-12

## 2020-11-16 ASSESSMENT — ANXIETY QUESTIONNAIRES
6. BECOMING EASILY ANNOYED OR IRRITABLE: SEVERAL DAYS
5. BEING SO RESTLESS THAT IT IS HARD TO SIT STILL: NOT AT ALL
1. FEELING NERVOUS, ANXIOUS, OR ON EDGE: MORE THAN HALF THE DAYS
IF YOU CHECKED OFF ANY PROBLEMS ON THIS QUESTIONNAIRE, HOW DIFFICULT HAVE THESE PROBLEMS MADE IT FOR YOU TO DO YOUR WORK, TAKE CARE OF THINGS AT HOME, OR GET ALONG WITH OTHER PEOPLE: NOT DIFFICULT AT ALL
7. FEELING AFRAID AS IF SOMETHING AWFUL MIGHT HAPPEN: NOT AT ALL
3. WORRYING TOO MUCH ABOUT DIFFERENT THINGS: NOT AT ALL
GAD7 TOTAL SCORE: 4
2. NOT BEING ABLE TO STOP OR CONTROL WORRYING: NOT AT ALL

## 2020-11-16 ASSESSMENT — MIFFLIN-ST. JEOR: SCORE: 1906.14

## 2020-11-16 ASSESSMENT — PATIENT HEALTH QUESTIONNAIRE - PHQ9
SUM OF ALL RESPONSES TO PHQ QUESTIONS 1-9: 7
5. POOR APPETITE OR OVEREATING: SEVERAL DAYS

## 2020-11-16 NOTE — PROGRESS NOTES
SUBJECTIVE:   CC: Vida Whitfield is an 40 year old woman who presents for preventive health visit.       Patient has been advised of split billing requirements and indicates understanding: Yes  Healthy Habits:    Do you get at least three servings of calcium containing foods daily (dairy, green leafy vegetables, etc.)? yes    Amount of exercise or daily activities, outside of work: a couple hours per week    Problems taking medications regularly No    Medication side effects: No    Have you had an eye exam in the past two years? yes    Do you see a dentist twice per year? yes    Do you have sleep apnea, excessive snoring or daytime drowsiness?yes, uses cpap; this works well.     Patient states she has been bruising a lot lately.        Today's PHQ-2 Score:   PHQ-2 (  [employer]) 2019 10/5/2018   Q1: Little interest or pleasure in doing things 2 0   Q2: Feeling down, depressed or hopeless 1 0   PHQ-2 Score 3 0   Q1: Little interest or pleasure in doing things More than half the days -   Q2: Feeling down, depressed or hopeless Several days -   PHQ-2 Score 3 -       Abuse: Current or Past(Physical, Sexual or Emotional)- No  Do you feel safe in your environment? Yes        Social History     Tobacco Use     Smoking status: Former Smoker     Types: Cigarettes     Start date: 1998     Quit date: 2001     Years since quittin.4     Smokeless tobacco: Never Used     Tobacco comment: not a smoker   Substance Use Topics     Alcohol use: Yes     Comment: rare     If you drink alcohol do you typically have >3 drinks per day or >7 drinks per week? No                     Reviewed orders with patient.  Reviewed health maintenance and updated orders accordingly - Yes    Mammograms start at age 40.     Pertinent mammograms are reviewed under the imaging tab.  History of abnormal Pap smear: NO - age 30- 65 PAP every 3 years recommended  PAP / HPV Latest Ref Rng & Units 2017   PAP - NIL NIL  "NIL   HPV 16 DNA NEG:Negative Negative - -   HPV 18 DNA NEG:Negative Negative - -   OTHER HR HPV NEG:Negative Negative - -     Reviewed and updated as needed this visit by clinical staff  Tobacco  Allergies  Meds   Med Hx  Surg Hx  Fam Hx  Soc Hx        Reviewed and updated as needed this visit by Provider                ROS:  CONSTITUTIONAL: NEGATIVE for fever, chills, change in weight  INTEGUMENTARU/SKIN: NEGATIVE for worrisome rashes, moles or lesions  EYES: NEGATIVE for vision changes or irritation  ENT: NEGATIVE for ear, mouth and throat problems  RESP: NEGATIVE for significant cough or SOB  BREAST: NEGATIVE for masses, tenderness or discharge  CV: NEGATIVE for chest pain, palpitations or peripheral edema  GI: NEGATIVE for nausea, abdominal pain, heartburn, or change in bowel habits  : NEGATIVE for unusual urinary or vaginal symptoms. Periods are regular.  MUSCULOSKELETAL: NEGATIVE for significant arthralgias or myalgia  NEURO: NEGATIVE for weakness, dizziness or paresthesias  PSYCHIATRIC: NEGATIVE for changes in mood or affect  She is bruising more easily, for the last few months.     OBJECTIVE:   BP (!) 120/92 (BP Location: Left arm, Patient Position: Chair, Cuff Size: Adult Large)   Pulse 93   Temp 98.3  F (36.8  C) (Tympanic)   Resp 16   Ht 1.772 m (5' 9.75\")   Wt 115.5 kg (254 lb 9.6 oz)   LMP 08/15/2020 (Approximate)   SpO2 98%   BMI 36.79 kg/m    EXAM:  GENERAL APPEARANCE: healthy, alert and no distress; over weight  EYES: EOMI,  PERRL  HENT: ear canals and TM's normal and nose and mouth without ulcers or lesions  NECK: no adenopathy, no asymmetry, masses, or scars and thyroid normal to palpation  RESP: lungs clear to auscultation - no rales, rhonchi or wheezes  CV: regular rates and rhythm, normal S1 S2, no S3 or S4 and no murmur, click or rub -  ABDOMEN:  soft, nontender, no HSM or masses and bowel sounds normal  : normal cervix with some friability, adnexae, and uterus without masses " "or discharge and rectal exam normal without masses-guaiac negative stool  MS: extremities normal- no gross deformities noted, no evidence of inflammation in joints, FROM in all extremities.  SKIN: no suspicious lesions or rashes; there are some bruises on the extremities.   NEURO: Normal strength and tone, sensory exam grossly normal, mentation intact and speech normal  PSYCH: mentation appears normal and affect normal/bright  LYMPHATICS: No axillary, cervical, inguinal, or supraclavicular nodes    ASSESSMENT/PLAN:   1. Routine general medical examination at a health care facility  Patient has been advised of split billing requirements and indicates understanding: Yes  COUNSELING:   Reviewed preventive health counseling, as reflected in patient instructions       Regular exercise       Healthy diet/nutrition       Vision screening       Hearing screening  Estimated body mass index is 36.79 kg/m  as calculated from the following:    Height as of this encounter: 1.772 m (5' 9.75\").    Weight as of this encounter: 115.5 kg (254 lb 9.6 oz).  She reports that she quit smoking about 19 years ago. Her smoking use included cigarettes. She started smoking about 22 years ago. She has never used smokeless tobacco.  Counseling Resources:  ATP IV Guidelines  Pooled Cohorts Equation Calculator  Breast Cancer Risk Calculator  BRCA-Related Cancer Risk Assessment: FHS-7 Tool  FRAX Risk Assessment  ICSI Preventive Guidelines  Dietary Guidelines for Americans, 2010  USDA's MyPlate  ASA Prophylaxis  Lung CA Screening  - Pap imaged thin layer screen reflex to HPV if ASCUS - recommend age 25 - 29    2. Bruising tendency (H)  For the bruising on the skin, do the above labs and also the Hematology referral is done.   Call for that appt.   - CBC with platelets  - INR  - Partial thromboplastin time  - Oncology/Hematology Adult Referral; Future    3. Generalized anxiety disorder  Use the meds as discussed. Refills are done. Avoid stimulants. "     4. Moderate recurrent major depression (H)  Use the Wellbutrin as discussed and the non drug therapies.   Refills are done for one year if stable.     5. Hypothyroidism, unspecified type  The thyroid labs are normal and refills are done for one year.   Monitor for the symptoms of high and low thyroid.       Fco Medeiros MD  [facility]

## 2020-11-16 NOTE — PATIENT INSTRUCTIONS
"  Preventive Health Recommendations  Female Ages 26 - 39  Yearly exam:   See your health care provider every year in order to    Review health changes.     Discuss preventive care.      Review your medicines if you your doctor has prescribed any.    Until age 30: Get a Pap test every three years (more often if you have had an abnormal result).    After age 30: Talk to your doctor about whether you should have a Pap test every 3 years or have a Pap test with HPV screening every 5 years.   You do not need a Pap test if your uterus was removed (hysterectomy) and you have not had cancer.  You should be tested each year for STDs (sexually transmitted diseases), if you're at risk.   Talk to your provider about how often to have your cholesterol checked.  If you are at risk for diabetes, you should have a diabetes test (fasting glucose).  Shots: Get a flu shot each year. Get a tetanus shot every 10 years.   Nutrition:     Eat at least 5 servings of fruits and vegetables each day.    Eat whole-grain bread, whole-wheat pasta and brown rice instead of white grains and rice.    Get adequate Calcium and Vitamin D.     Lifestyle    Exercise at least 150 minutes a week (30 minutes a day, 5 days of the week). This will help you control your weight and prevent disease.    Limit alcohol to one drink per day.    No smoking.     Wear sunscreen to prevent skin cancer.    See your dentist every six months for an exam and cleaning.        ASSESSMENT/PLAN:   1. Routine general medical examination at a health care facility  Patient has been advised of split billing requirements and indicates understanding: Yes  COUNSELING:   Reviewed preventive health counseling, as reflected in patient instructions       Regular exercise       Healthy diet/nutrition       Vision screening       Hearing screening  Estimated body mass index is 36.79 kg/m  as calculated from the following:    Height as of this encounter: 1.772 m (5' 9.75\").    Weight as of " this encounter: 115.5 kg (254 lb 9.6 oz).  She reports that she quit smoking about 19 years ago. Her smoking use included cigarettes. She started smoking about 22 years ago. She has never used smokeless tobacco.  Counseling Resources:  ATP IV Guidelines  Pooled Cohorts Equation Calculator  Breast Cancer Risk Calculator  BRCA-Related Cancer Risk Assessment: FHS-7 Tool  FRAX Risk Assessment  ICSI Preventive Guidelines  Dietary Guidelines for Americans, 2010  USDA's MyPlate  ASA Prophylaxis  Lung CA Screening  - Pap imaged thin layer screen reflex to HPV if ASCUS - recommend age 25 - 29    2. Bruising tendency (H)  For the bruising on the skin, do the above labs and also the Hematology referral is done.   Call for that appt.   - CBC with platelets  - INR  - Partial thromboplastin time  - Oncology/Hematology Adult Referral; Future    3. Generalized anxiety disorder  Use the meds as discussed. Refills are done. Avoid stimulants.     4. Moderate recurrent major depression (H)  Use the Wellbutrin as discussed and the non drug therapies.   Refills are done for one year if stable.     5. Hypothyroidism, unspecified type  The thyroid labs are normal and refills are done for one year.   Monitor for the symptoms of high and low thyroid.

## 2020-11-17 ASSESSMENT — ANXIETY QUESTIONNAIRES: GAD7 TOTAL SCORE: 4

## 2020-11-18 LAB
COPATH REPORT: NORMAL
PAP: NORMAL

## 2020-12-09 DIAGNOSIS — G47.33 OBSTRUCTIVE SLEEP APNEA (ADULT) (PEDIATRIC): Primary | ICD-10-CM

## 2020-12-16 DIAGNOSIS — M06.09 RHEUMATOID ARTHRITIS OF MULTIPLE SITES WITH NEGATIVE RHEUMATOID FACTOR (H): ICD-10-CM

## 2020-12-16 DIAGNOSIS — Z79.899 HIGH RISK MEDICATION USE: ICD-10-CM

## 2020-12-16 LAB
ALBUMIN SERPL-MCNC: 3.8 G/DL (ref 3.4–5)
ALP SERPL-CCNC: 57 U/L (ref 40–150)
ALT SERPL W P-5'-P-CCNC: 23 U/L (ref 0–50)
AST SERPL W P-5'-P-CCNC: 16 U/L (ref 0–45)
BASOPHILS # BLD AUTO: 0 10E9/L (ref 0–0.2)
BASOPHILS NFR BLD AUTO: 0.5 %
BILIRUB DIRECT SERPL-MCNC: 0.1 MG/DL (ref 0–0.2)
BILIRUB SERPL-MCNC: 0.3 MG/DL (ref 0.2–1.3)
CREAT SERPL-MCNC: 0.99 MG/DL (ref 0.52–1.04)
CRP SERPL-MCNC: 8 MG/L (ref 0–8)
DIFFERENTIAL METHOD BLD: NORMAL
EOSINOPHIL # BLD AUTO: 0 10E9/L (ref 0–0.7)
EOSINOPHIL NFR BLD AUTO: 0.3 %
ERYTHROCYTE [DISTWIDTH] IN BLOOD BY AUTOMATED COUNT: 12.9 % (ref 10–15)
ERYTHROCYTE [SEDIMENTATION RATE] IN BLOOD BY WESTERGREN METHOD: 7 MM/H (ref 0–20)
GFR SERPL CREATININE-BSD FRML MDRD: 71 ML/MIN/{1.73_M2}
HCT VFR BLD AUTO: 38.3 % (ref 35–47)
HGB BLD-MCNC: 13 G/DL (ref 11.7–15.7)
LYMPHOCYTES # BLD AUTO: 2.8 10E9/L (ref 0.8–5.3)
LYMPHOCYTES NFR BLD AUTO: 43.1 %
MCH RBC QN AUTO: 32.1 PG (ref 26.5–33)
MCHC RBC AUTO-ENTMCNC: 33.9 G/DL (ref 31.5–36.5)
MCV RBC AUTO: 95 FL (ref 78–100)
MONOCYTES # BLD AUTO: 0.7 10E9/L (ref 0–1.3)
MONOCYTES NFR BLD AUTO: 10.4 %
NEUTROPHILS # BLD AUTO: 3 10E9/L (ref 1.6–8.3)
NEUTROPHILS NFR BLD AUTO: 45.7 %
PLATELET # BLD AUTO: 175 10E9/L (ref 150–450)
PROT SERPL-MCNC: 7.3 G/DL (ref 6.8–8.8)
RBC # BLD AUTO: 4.05 10E12/L (ref 3.8–5.2)
WBC # BLD AUTO: 6.6 10E9/L (ref 4–11)

## 2020-12-16 PROCEDURE — 36415 COLL VENOUS BLD VENIPUNCTURE: CPT | Performed by: INTERNAL MEDICINE

## 2020-12-16 PROCEDURE — 85025 COMPLETE CBC W/AUTO DIFF WBC: CPT | Performed by: INTERNAL MEDICINE

## 2020-12-16 PROCEDURE — 82565 ASSAY OF CREATININE: CPT | Performed by: INTERNAL MEDICINE

## 2020-12-16 PROCEDURE — 86140 C-REACTIVE PROTEIN: CPT | Performed by: INTERNAL MEDICINE

## 2020-12-16 PROCEDURE — 85652 RBC SED RATE AUTOMATED: CPT | Performed by: INTERNAL MEDICINE

## 2020-12-16 PROCEDURE — 80076 HEPATIC FUNCTION PANEL: CPT | Performed by: INTERNAL MEDICINE

## 2020-12-18 ENCOUNTER — VIRTUAL VISIT (OUTPATIENT)
Dept: RHEUMATOLOGY | Facility: CLINIC | Age: 40
End: 2020-12-18
Payer: COMMERCIAL

## 2020-12-18 DIAGNOSIS — Z79.899 HIGH RISK MEDICATION USE: ICD-10-CM

## 2020-12-18 DIAGNOSIS — M06.09 RHEUMATOID ARTHRITIS OF MULTIPLE SITES WITH NEGATIVE RHEUMATOID FACTOR (H): Primary | ICD-10-CM

## 2020-12-18 PROCEDURE — 99213 OFFICE O/P EST LOW 20 MIN: CPT | Mod: GT | Performed by: INTERNAL MEDICINE

## 2020-12-18 RX ORDER — SULFASALAZINE 500 MG/1
1500 TABLET, DELAYED RELEASE ORAL 2 TIMES DAILY
Qty: 540 TABLET | Refills: 2 | Status: SHIPPED | OUTPATIENT
Start: 2020-12-18 | End: 2021-07-06

## 2020-12-18 RX ORDER — FOLIC ACID 1 MG/1
1 TABLET ORAL DAILY
Qty: 100 TABLET | Refills: 2 | Status: SHIPPED | OUTPATIENT
Start: 2020-12-18 | End: 2021-10-26

## 2020-12-18 RX ORDER — METHOTREXATE 2.5 MG/1
15 TABLET ORAL WEEKLY
Qty: 72 TABLET | Refills: 2 | Status: SHIPPED | OUTPATIENT
Start: 2020-12-18 | End: 2021-07-06

## 2020-12-18 NOTE — PROGRESS NOTES
"Vida Whitfield is a 40 year old female who is being evaluated via a billable video visit.      The patient has been notified of following:     \"This video visit will be conducted via a call between you and your physician/provider. We have found that certain health care needs can be provided without the need for an in-person physical exam.  This service lets us provide the care you need with a video conversation.  If a prescription is necessary we can send it directly to your pharmacy.  If lab work is needed we can place an order for that and you can then stop by our lab to have the test done at a later time.    Video visits are billed at different rates depending on your insurance coverage.  Please reach out to your insurance provider with any questions.    If during the course of the call the physician/provider feels a video visit is not appropriate, you will not be charged for this service.\"    Patient has given verbal consent for Video visit? Yes  How would you like to obtain your AVS? MyChart  If you are dropped from the video visit, the video invite should be resent to: Text to cell phone: [phone]  Will anyone else be joining your video visit? No    Nicolette Sarmiento CMA Rheumatology  12/18/2020 9:53 AM      Rheumatology Video Visit      Vida Whitfield MRN# [id number]   YOB: 1980 Age: 40 year old      Date of visit: 12/18/20   PCP: Dr. Fco Medeiros    Chief Complaint   Patient presents with:  Arthritis: RA    Assessment and Plan     1. Seronegative nonerosive rheumatoid arthritis: Currently on methotrexate 15 mg once weekly (Cr elevation possibly secondary to MTX so MTX was reduced with improvement of Cr), folic acid 1 mg daily, and SSZ 1500mg BID.  Doing well today   - Continue methotrexate 15mg once weekly  - Continue folic acid 1 mg daily  - Continue sulfasalazine to 1500mg BID   - Avoid oral NSAIDs because they are historically triggers for urticaria; tolerates voltaren gel  - Labs in 3 " months: CBC, Creatinine, Hepatic Panel  - Labs in 6 months: CBC, Creatinine, Hepatic Panel, ESR, CRP      # On birth control per patient    2. Bilateral patellofemoral syndrome: Previous steroid injections have been effective.  Doing exercises and trying to lose weight; doing well today.     3. Urticaria: Historically, oral NSAIDs are triggers.  Documented here for clinical significance only.  Not managed in this clinic.    4. Bone health: 9/20/2016 Vitamin D level normal.     We discussed the COVID-19 vaccine    # Relevant labs and imaging were reviewed with the patient    # High risk medication toxicity monitoring: discussion and labs reviewed; appropriate labs ordered. See above.  Instructed that if confirmed to have COVID-19 or exposure to someone with confirmed COVID-19 to call this clinic for directions on DMARD management.    # Note that this is a virtual visit to reduce the risk of COVID-19 exposure during this current pandemic.      # Considered to be at high risk of complications from the COVID-19 virus.  It is recommended to limit contact with other people and if possible to work remotely or provide a leave of absence to reduce the risk for COVID-19.      Ms. Whitfield verbalized agreement with and understanding of the rational for the diagnosis and treatment plan.  All questions were answered to best of my ability and the patient's satisfaction. Ms. Whitfield was advised to contact the clinic with any questions that may arise after the clinic visit.      Thank you for involving me in the care of the patient    Return to clinic: 6 months; sooner if needed    HPI   Vida Whitfield is a 40 year old female with a medical history significant for anxiety, depression, chronic idiopathic urticaria, hyperlipidemia, hypothyroidism, rosacea, obstructive sleep apnea, obesity, and inflammatory arthritis who presents for follow-up of rheumatoid arthritis.    Historical records in this paragraph: She was previously evaluated by  Dr. Blaine Anthony at [facility].  2014 labs show negative: hepatitis C ab, HBV surface ag, HBV core ab, HLA-B27, Sm, RNP, SSA, SSB, Scl-70, DNA, cardiolipin, CCP, PR3, MPO. NIKKIE 1:160 homogeneous.  MPO also positive (one negative and one positive value). Per a 3/11/2016 clinic note, she has seronegative rheumatoid arthritis and chronic urticaria. She developed joint swelling and stiffness in the bilateral MCPs, MTPs, ankles, and knees that started in November 2013 shortly after tapering off prednisone that was used to manage chronic urticaria. No history of psoriasis or inflammatory bowel disease. Negative SI joint x-rays. Negative MRI of the SI joint. Flaring of her urticaria with NSAIDs. Steroid responsive in regard to her joints. Near resolution of joint stiffness and pain with methotrexate. NSAIDs are avoided because they cause flares of her urticaria.    Today, Ms. Whitfield reports she is doing well.  No joint pain.  Knees are doing well.  Morning stiffness for less than 10 minutes.  No gelling phenomenon.  Tolerating medications well.    Denies fevers, chills, nausea, vomiting, constipation, diarrhea. No abdominal pain. No chest pain/pressure, palpitations, or shortness of breath.  No neck pain. No oral or nasal sores.    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 Trying to lose weight.   SKIN: No itching, rashes, sores recently; hx of urticaria  HEENT: No oral or nasal ulcers.  CV: No chest pain, pressure, palpitations, or dyspnea on exertion.  PULM: No SOB, wheeze, cough.  GI: No nausea, vomiting, constipation, diarrhea. No blood in stool. No abdominal pain.  : No blood in urine.  MSK: See HPI.  NEURO: Negative  PSYCH: Negative    Active Problem List     Patient Active Problem List   Diagnosis     Contraceptive management     Hyperhidrosis of  axilla     CARDIOVASCULAR SCREENING; LDL GOAL LESS THAN 160     Generalized anxiety disorder     Moderate recurrent major depression (H)     Chronic idiopathic urticaria     Hyperlipidemia with target LDL less than 130     Hypothyroidism, unspecified hypothyroidism type     Rosacea     Non morbid obesity due to excess calories     BRIDGETTE (obstructive sleep apnea)     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 factor (H)     High risk medication use     Obesity (BMI 35.0-39.9) with comorbidity (H)     Past Medical History     Past Medical History:   Diagnosis Date     Arthritis 01/01/2013     Depressive disorder 01/01/2003     Peripheral autonomic neuropathy in disorders classified elsewhere(337.1)      Pure hypercholesterolemia      Thyroid disease 01/01/1995     Past Surgical History     Past Surgical History:   Procedure Laterality Date     C APPENDECTOMY  2002     CHOLECYSTECTOMY, LAPOROSCOPIC  2006    Cholecystectomy, Laparoscopic     SURGICAL HISTORY OF -   17 years old    Thyroid ablation     Allergy     Allergies   Allergen Reactions     Macrobid [Nitrofuran Derivatives]      Paxil [Paroxetine]      Anxiety         Prednisone      Vomiting      Zoloft      Mood changes     Current Medication List     Current Outpatient Medications   Medication Sig     ALPRAZolam (XANAX) 0.5 MG tablet Take 1 tablet (0.5 mg) by mouth 3 times daily as needed for anxiety     buPROPion (WELLBUTRIN XL) 150 MG 24 hr tablet Take 3 tablets by mouth once daily     busPIRone (BUSPAR) 15 MG tablet Take 1 tablet (15 mg) by mouth 2 times daily     cetirizine (ZYRTEC ALLERGY) 10 MG tablet Take 20 mg by mouth daily.     diclofenac (VOLTAREN) 1 % GEL topical gel Apply 2 grams to hands four times daily using enclosed dosing card.     folic acid (FOLVITE) 1 MG tablet Take 1 tablet (1 mg) by mouth daily     levonorgestrel-ethinyl estradiol (SEASONALE) 0.15-0.03 MG tablet Take 1 tablet by mouth daily     levothyroxine  "(SYNTHROID/LEVOTHROID) 175 MCG tablet Take 1 tablet (175 mcg) by mouth daily     methotrexate sodium 2.5 MG TABS Take 6 tablets (15 mg) by mouth once a week . Take all 6 tablets on the same day of each week.     Multiple Vitamin (MULTIVITAMIN) per tablet Take 1 tablet by mouth daily.     sulfaSALAzine ER (AZULFIDINE EN) 500 MG EC tablet Take 3 tablets (1,500 mg) by mouth 2 times daily     traZODone (DESYREL) 50 MG tablet Take 1-2 tablets by mouth at bedtime.     order for DME Equipment being ordered: CPAP 6-10 cm     No current facility-administered medications for this visit.          Social History   See HPI    Family History     Family History   Problem Relation Age of Onset     Diabetes Mother      Hypertension Mother      Lipids Mother      Hyperlipidemia Mother      Hypertension Father      Lipids Father      Thyroid Disease Father      Cancer Father      Hyperlipidemia Father      Other Cancer Father         Gastric & Bone Cancer     Diabetes Maternal Grandmother      Diabetes Paternal Grandmother      Melanoma No family hx of        Physical Exam     Temp Readings from Last 3 Encounters:   11/16/20 98.3  F (36.8  C) (Tympanic)   11/07/19 98.1  F (36.7  C) (Tympanic)   12/14/18 97.6  F (36.4  C) (Oral)     BP Readings from Last 5 Encounters:   11/16/20 (!) 120/92   12/20/19 129/82   11/07/19 110/82   06/14/19 131/88   12/14/18 127/82     Pulse Readings from Last 1 Encounters:   11/16/20 93     Resp Readings from Last 1 Encounters:   11/16/20 16     Estimated body mass index is 36.79 kg/m  as calculated from the following:    Height as of 11/16/20: 1.772 m (5' 9.75\").    Weight as of 11/16/20: 115.5 kg (254 lb 9.6 oz).        GEN: NAD. Healthy appearing adult.   HEENT: MMM.  Anicteric, noninjected sclera. No obvious external lesions of the ear and nose. Hearing intact.  PULM: No increased work of breathing  MSK:  Hands and wrists without swelling.    SKIN: No rash or jaundice seen  PSYCH: Alert. Appropriate. "            Labs / Imaging (select studies)       CBC  Recent Labs   Lab Test 12/16/20  1826 11/16/20  1514 10/15/20  1434 06/16/20 1824   WBC 6.6 6.1 7.0 6.1   RBC 4.05 4.08 3.84 4.15   HGB 13.0 13.2 12.2 13.1   HCT 38.3 38.1 35.8 37.9   MCV 95 93 93 91   RDW 12.9 12.5 12.3 14.2    174 170 173   MCH 32.1 32.4 31.8 31.6   MCHC 33.9 34.6 34.1 34.6   NEUTROPHIL 45.7  --  44.7 38.0   LYMPH 43.1  --  44.8 48.0   MONOCYTE 10.4  --  10.0 12.3   EOSINOPHIL 0.3  --  0.1 0.7   BASOPHIL 0.5  --  0.4 1.0   ANEU 3.0  --  3.1 2.3   ALYM 2.8  --  3.1 2.9   FREDA 0.7  --  0.7 0.8   AEOS 0.0  --  0.0 0.0   ABAS 0.0  --  0.0 0.1     CMP  Recent Labs   Lab Test 12/16/20  1826 10/15/20  1434 06/16/20  1824 10/19/19  1041 10/19/19  1041 09/23/18  1058 09/23/18  1058 12/20/16  1138 12/20/16  1138   NA  --   --   --   --   --   --   --   --  138   POTASSIUM  --   --   --   --   --   --   --   --  4.4   CHLORIDE  --   --   --   --   --   --   --   --  104   CO2  --   --   --   --   --   --   --   --  30   ANIONGAP  --   --   --   --   --   --   --   --  4   GLC  --   --   --   --  81  --  79  --  84   BUN  --   --   --   --   --   --   --   --  13   CR 0.99 0.97 0.90   < >  --    < >  --    < > 1.05*   GFRESTIMATED 71 73 81   < >  --    < >  --    < > 59*   GFRESTBLACK 83 85 >90   < >  --    < >  --    < > 72   TATI  --   --   --   --   --   --   --   --  8.7   BILITOTAL 0.3 0.4 0.3   < >  --    < >  --    < >  --    ALBUMIN 3.8 3.5 3.4   < >  --    < >  --    < >  --    PROTTOTAL 7.3 7.2 7.0   < >  --    < >  --    < >  --    ALKPHOS 57 45 52   < >  --    < >  --    < >  --    AST 16 19 23   < >  --    < >  --    < >  --    ALT 23 22 35   < >  --    < >  --    < >  --     < > = values in this interval not displayed.     Calcium/VitaminD  Recent Labs   Lab Test 12/20/16  1138 09/20/16  1554   TATI 8.7  --    VITDT  --  36     ESR/CRP  Recent Labs   Lab Test 12/16/20  1826 06/16/20  1824 12/17/19  1640   SED 7 7 7   CRP 8.0 11.0* 7.3      Lipid Panel  Recent Labs   Lab Test 10/19/19  1041 09/23/18  1058 09/02/16  0736 06/19/14  0850 06/27/13  1023   CHOL 187 166 157 159 221*   TRIG 80 91 70 113 154*   HDL 58 54 57 36* 49*   * 94 86 101 141*   VLDL  --   --   --  23 31*   CHOLHDLRATIO  --   --   --  4.0 4.0   NHDL 129 112 100  --   --      From care everywhere:      Immunization History     Immunization History   Administered Date(s) Administered     HepB 07/22/1999     Historical DTP/aP 1980, 1980, 1980, 11/02/1982     Influenza (IIV3) PF 11/05/1996, 10/31/2019     Influenza Vaccine IM > 6 months Valent IIV4 10/29/2020     MMR 09/07/1992     OPV, trivalent, live 1981, 1981, 1981, 11/02/1982     Pneumo Conj 13-V (2010&after) 09/20/2016     Pneumococcal 23 valent 12/20/2016     TD (ADULT, 7+) 07/14/1995, 01/17/2006     Tdap (Adacel,Boostrix) 12/10/2013            Chart documentation done in part with Dragon Voice recognition Software. Although reviewed after completion, some word and grammatical error may remain.      Video-Visit Details    Type of service:  Video Visit    Video Start Time: 10:05 AM  Video End Time: 10:14 AM    Originating Location (pt. Location): [place], [place]    Distant Location (provider location):  Home    Platform used for Video Visit: garrison Benítez MD

## 2021-06-24 DIAGNOSIS — M06.09 RHEUMATOID ARTHRITIS OF MULTIPLE SITES WITH NEGATIVE RHEUMATOID FACTOR (H): ICD-10-CM

## 2021-06-24 DIAGNOSIS — Z79.899 HIGH RISK MEDICATION USE: ICD-10-CM

## 2021-06-24 LAB
ALBUMIN SERPL-MCNC: 3.3 G/DL (ref 3.4–5)
ALP SERPL-CCNC: 47 U/L (ref 40–150)
ALT SERPL W P-5'-P-CCNC: 21 U/L (ref 0–50)
AST SERPL W P-5'-P-CCNC: 17 U/L (ref 0–45)
BASOPHILS # BLD AUTO: 0 10E9/L (ref 0–0.2)
BASOPHILS NFR BLD AUTO: 0.4 %
BILIRUB DIRECT SERPL-MCNC: 0.2 MG/DL (ref 0–0.2)
BILIRUB SERPL-MCNC: 0.6 MG/DL (ref 0.2–1.3)
CREAT SERPL-MCNC: 1.03 MG/DL (ref 0.52–1.04)
CRP SERPL-MCNC: 16.2 MG/L (ref 0–8)
DIFFERENTIAL METHOD BLD: NORMAL
EOSINOPHIL # BLD AUTO: 0.1 10E9/L (ref 0–0.7)
EOSINOPHIL NFR BLD AUTO: 1.8 %
ERYTHROCYTE [DISTWIDTH] IN BLOOD BY AUTOMATED COUNT: 13.1 % (ref 10–15)
ERYTHROCYTE [SEDIMENTATION RATE] IN BLOOD BY WESTERGREN METHOD: 9 MM/H (ref 0–20)
GFR SERPL CREATININE-BSD FRML MDRD: 68 ML/MIN/{1.73_M2}
HCT VFR BLD AUTO: 38.1 % (ref 35–47)
HGB BLD-MCNC: 13.1 G/DL (ref 11.7–15.7)
LYMPHOCYTES # BLD AUTO: 2.7 10E9/L (ref 0.8–5.3)
LYMPHOCYTES NFR BLD AUTO: 40.5 %
MCH RBC QN AUTO: 31.4 PG (ref 26.5–33)
MCHC RBC AUTO-ENTMCNC: 34.4 G/DL (ref 31.5–36.5)
MCV RBC AUTO: 91 FL (ref 78–100)
MONOCYTES # BLD AUTO: 0.6 10E9/L (ref 0–1.3)
MONOCYTES NFR BLD AUTO: 8.3 %
NEUTROPHILS # BLD AUTO: 3.3 10E9/L (ref 1.6–8.3)
NEUTROPHILS NFR BLD AUTO: 49 %
PLATELET # BLD AUTO: 182 10E9/L (ref 150–450)
PROT SERPL-MCNC: 6.8 G/DL (ref 6.8–8.8)
RBC # BLD AUTO: 4.17 10E12/L (ref 3.8–5.2)
WBC # BLD AUTO: 6.8 10E9/L (ref 4–11)

## 2021-06-24 PROCEDURE — 86140 C-REACTIVE PROTEIN: CPT | Performed by: INTERNAL MEDICINE

## 2021-06-24 PROCEDURE — 36415 COLL VENOUS BLD VENIPUNCTURE: CPT | Performed by: INTERNAL MEDICINE

## 2021-06-24 PROCEDURE — 85025 COMPLETE CBC W/AUTO DIFF WBC: CPT | Performed by: INTERNAL MEDICINE

## 2021-06-24 PROCEDURE — 85652 RBC SED RATE AUTOMATED: CPT | Performed by: INTERNAL MEDICINE

## 2021-06-24 PROCEDURE — 80076 HEPATIC FUNCTION PANEL: CPT | Performed by: INTERNAL MEDICINE

## 2021-06-24 PROCEDURE — 82565 ASSAY OF CREATININE: CPT | Performed by: INTERNAL MEDICINE

## 2021-07-06 ENCOUNTER — VIRTUAL VISIT (OUTPATIENT)
Dept: RHEUMATOLOGY | Facility: CLINIC | Age: 41
End: 2021-07-06
Payer: COMMERCIAL

## 2021-07-06 DIAGNOSIS — Z79.899 HIGH RISK MEDICATION USE: ICD-10-CM

## 2021-07-06 DIAGNOSIS — M06.09 RHEUMATOID ARTHRITIS OF MULTIPLE SITES WITH NEGATIVE RHEUMATOID FACTOR (H): Primary | ICD-10-CM

## 2021-07-06 PROCEDURE — 99214 OFFICE O/P EST MOD 30 MIN: CPT | Mod: GT | Performed by: INTERNAL MEDICINE

## 2021-07-06 RX ORDER — METHOTREXATE 2.5 MG/1
15 TABLET ORAL WEEKLY
Qty: 78 TABLET | Refills: 1 | Status: SHIPPED | OUTPATIENT
Start: 2021-07-06 | End: 2021-10-26

## 2021-07-06 RX ORDER — METHOTREXATE 2.5 MG/1
15 TABLET ORAL WEEKLY
Qty: 28 TABLET | Refills: 1 | Status: SHIPPED | OUTPATIENT
Start: 2021-07-06 | End: 2021-07-06

## 2021-07-06 RX ORDER — HYDROXYCHLOROQUINE SULFATE 200 MG/1
400 TABLET, FILM COATED ORAL DAILY
Qty: 60 TABLET | Refills: 3 | Status: SHIPPED | OUTPATIENT
Start: 2021-07-06 | End: 2021-10-26

## 2021-07-06 NOTE — PATIENT INSTRUCTIONS
RHEUMATOLOGY    Dr. Andrea Benítez    [facility]  [address] [place] [address]    Our new phone number for Rheumatology is [phone], this number will be able to help you schedule appointments for Dr. Benítez or if you have any message you would like sent to us.    Thank you for choosing [facility]!    Nicolette Sarmiento [facility]

## 2021-07-06 NOTE — PROGRESS NOTES
Vida Whitfield  is a 41 year old year old female who is being evaluated via a billable video visit.      What state will you be in during your video visit? [place]  How would you like to obtain your AVS? Ese  If the video visit is dropped, the invitation should be resent by: Text to cell phone: [phone]  Will anyone else be joining your video visit? No     Rheumatology Video Visit      Vida Whitfield MRN# [id number]   YOB: 1980 Age: 41 year old      Date of visit: 7/06/21   PCP: Dr. Fco Medeiros    Chief Complaint   Patient presents with:  Arthritis: RA    Assessment and Plan     1. Seronegative nonerosive rheumatoid arthritis: Currently on methotrexate 15 mg once weekly (Cr elevation possibly secondary to MTX so MTX was reduced with improvement of Cr), folic acid 1 mg daily, and SSZ 1500mg BID.  Doing well today.  However, sulfasalazine shortages have made it very difficult for her to remain on sulfasalazine.  We discussed the option of changing sulfasalazine to the nonenteric-coated formulation, changing sulfasalazine to hydroxychloroquine, or changing sulfasalazine to a biologic DMARD or targeted synthetic DMARD.  After thorough discussion, will discontinue sulfasalazine and start hydroxychloroquine.  If the combination methotrexate and hydroxychloroquine is not effective, then will consider discontinuing hydroxychloroquine and starting a biologic DMARD or targeted synthetic DMARD.  In the future may also consider reinitiation of sulfasalazine because it is effective but being discontinued because it is not available due to drug shortages.  Chronic illness  - Continue methotrexate 15mg once weekly  - Continue folic acid 1 mg daily  - Discontinue sulfasalazine 1500mg BID   - Start hydroxychloroquine 400mg daily  - Avoid oral NSAIDs because they are historically triggers for urticaria; tolerates voltaren gel  - Labs in 3 months: CBC, Creatinine, Hepatic Panel, ESR, CRP    High risk  medication requiring intensive toxicity monitoring at least quarterly: labs ordered include CBC, Creatinine, Hepatic panel to monitor for cytopenia and hepatotoxicity; checking creatinine as it affects clearance of medication.      # Hydroxychloroquine (Plaquenil) Risks and Benefits:  The risks and benefits of hydroxychloroquine were discussed in detail and the patient verbalized understanding; the patient also verbalized agreement to get the required ophthalmologic toxicity monitoring.  The risks discussed include, but are not limited to, the risk for hypersensitivity, anaphylaxis, anaphylactoid reactions, irreversible retinal damage, rare hematologic reactions, and rare cardiomyopathy.  Patients with G6PD deficiency or hepatic impairment may be at an increased risk for adverse effects.  I encouraged reviewing the package insert and asking any questions about the medication.       # On birth control per patient    2. Bilateral patellofemoral syndrome: Previous steroid injections have been effective.  Doing exercises and trying to lose weight; doing well today.     3. Urticaria: Historically, oral NSAIDs are triggers.  Documented here for clinical significance only.  Not managed in this clinic.    4. Bone health: 9/20/2016 Vitamin D level normal.     # At this time the COVID-19 vaccine is recommended based on our knowledge of this vaccine and vaccines in the past.  As long as there is no history of allergic reaction to the vaccine, the COVID19 vaccine from [employer] or [employer] may be received.  The Torsten&Torsten / Yasmeen COVID-19 vaccine has a risk of a rare adverse event called thrombosis with thrombocytopenia syndrome that has been reported in women younger than 50 years old; therefore, an alternative to the J&J/Yasmeen vaccine should be used for women under 50 years old and for any patient with a history of blood clots.  Note that there is limited data currently available to specifically establish safety and  efficacy for these vaccines in immunocompromised people.    Based on our current understanding (and this may change over time as we learn more), the following adjustments are recommended around the time of COVID-19 vaccination:  - Methotrexate should be held for 1 week after each of the 2 mRNA vaccine doses.  Methotrexate should be held for 2 weeks after the single-dose COVID-19 vaccination    Total minutes spent in evaluation with patient, documentation, , and review of pertinent studies and chart notes: 12     Ms. Whitfield verbalized agreement with and understanding of the rational for the diagnosis and treatment plan.  All questions were answered to best of my ability and the patient's satisfaction. Ms. Whitfield was advised to contact the clinic with any questions that may arise after the clinic visit.      Thank you for involving me in the care of the patient    Return to clinic: 3 months    HPI   Vida Whitfield is a 41 year old female with a medical history significant for anxiety, depression, chronic idiopathic urticaria, hyperlipidemia, hypothyroidism, rosacea, obstructive sleep apnea, obesity, and inflammatory arthritis who presents for follow-up of rheumatoid arthritis.    Historical records in this paragraph: She was previously evaluated by Dr. Blaine Anthony at [facility].  2014 labs show negative: hepatitis C ab, HBV surface ag, HBV core ab, HLA-B27, Sm, RNP, SSA, SSB, Scl-70, DNA, cardiolipin, CCP, PR3, MPO. NIKKIE 1:160 homogeneous.  MPO also positive (one negative and one positive value). Per a 3/11/2016 clinic note, she has seronegative rheumatoid arthritis and chronic urticaria. She developed joint swelling and stiffness in the bilateral MCPs, MTPs, ankles, and knees that started in November 2013 shortly after tapering off prednisone that was used to manage chronic urticaria. No history of psoriasis or inflammatory bowel disease. Negative SI joint x-rays. Negative MRI of the SI joint. Flaring of  her urticaria with NSAIDs. Steroid responsive in regard to her joints. Near resolution of joint stiffness and pain with methotrexate. NSAIDs are avoided because they cause flares of her urticaria.    Today, 7/6/2021: Doing well at this time.  No joint pain or morning stiffness.  No gelling phenomenon.  Knees are doing well.  No joint swelling.  Tolerating medications but having difficulty getting sulfasalazine due to sulfasalazine shortages.    No fevers or chills.  No nausea or vomiting.  No blood in her stool.  No oral or nasal sores.  No chest pain/pressure, palpitations, or shortness of breath.  No cough.    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    ROS   12 point review of system was completed and negative except as noted in the HPI     Active Problem List     Patient Active Problem List   Diagnosis     Contraceptive management     Hyperhidrosis of axilla     CARDIOVASCULAR SCREENING; LDL GOAL LESS THAN 160     Generalized anxiety disorder     Moderate recurrent major depression (H)     Chronic idiopathic urticaria     Hyperlipidemia with target LDL less than 130     Hypothyroidism, unspecified hypothyroidism type     Rosacea     Non morbid obesity due to excess calories     BRIDGETTE (obstructive sleep apnea)     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 factor (H)     High risk medication use     Obesity (BMI 35.0-39.9) with comorbidity (H)     Past Medical History     Past Medical History:   Diagnosis Date     Arthritis 01/01/2013     Depressive disorder 01/01/2003     Peripheral autonomic neuropathy in disorders classified elsewhere(337.1)      Pure hypercholesterolemia      Thyroid disease 01/01/1995     Past Surgical History     Past Surgical History:   Procedure Laterality Date     C APPENDECTOMY  2002     CHOLECYSTECTOMY, LAPOROSCOPIC  2006     Cholecystectomy, Laparoscopic     SURGICAL HISTORY OF -   17 years old    Thyroid ablation     Allergy     Allergies   Allergen Reactions     Macrobid [Nitrofuran Derivatives]      Paxil [Paroxetine]      Anxiety         Prednisone      Vomiting      Zoloft      Mood changes     Current Medication List     Current Outpatient Medications   Medication Sig     ALPRAZolam (XANAX) 0.5 MG tablet Take 1 tablet (0.5 mg) by mouth 3 times daily as needed for anxiety     buPROPion (WELLBUTRIN XL) 150 MG 24 hr tablet Take 3 tablets by mouth once daily     busPIRone (BUSPAR) 15 MG tablet Take 1 tablet (15 mg) by mouth 2 times daily     cetirizine (ZYRTEC ALLERGY) 10 MG tablet Take 20 mg by mouth daily.     diclofenac (VOLTAREN) 1 % GEL topical gel Apply 2 grams to hands four times daily using enclosed dosing card.     folic acid (FOLVITE) 1 MG tablet Take 1 tablet (1 mg) by mouth daily     levonorgestrel-ethinyl estradiol (SEASONALE) 0.15-0.03 MG tablet Take 1 tablet by mouth daily     levothyroxine (SYNTHROID/LEVOTHROID) 175 MCG tablet Take 1 tablet (175 mcg) by mouth daily     methotrexate sodium 2.5 MG TABS Take 6 tablets (15 mg) by mouth once a week . Take all 6 tablets on the same day of each week.     Multiple Vitamin (MULTIVITAMIN) per tablet Take 1 tablet by mouth daily.     order for DME Equipment being ordered: CPAP 6-10 cm     sulfaSALAzine ER (AZULFIDINE EN) 500 MG EC tablet Take 3 tablets (1,500 mg) by mouth 2 times daily     traZODone (DESYREL) 50 MG tablet Take 1-2 tablets by mouth at bedtime.     No current facility-administered medications for this visit.          Social History   See HPI    Family History     Family History   Problem Relation Age of Onset     Diabetes Mother      Hypertension Mother      Lipids Mother      Hyperlipidemia Mother      Hypertension Father      Lipids Father      Thyroid Disease Father      Cancer Father      Hyperlipidemia Father      Other Cancer Father         Gastric & Bone  "Cancer     Diabetes Maternal Grandmother      Diabetes Paternal Grandmother      Melanoma No family hx of        Physical Exam     Temp Readings from Last 3 Encounters:   11/16/20 98.3  F (36.8  C) (Tympanic)   11/07/19 98.1  F (36.7  C) (Tympanic)   12/14/18 97.6  F (36.4  C) (Oral)     BP Readings from Last 5 Encounters:   11/16/20 (!) 120/92   12/20/19 129/82   11/07/19 110/82   06/14/19 131/88   12/14/18 127/82     Pulse Readings from Last 1 Encounters:   11/16/20 93     Resp Readings from Last 1 Encounters:   11/16/20 16     Estimated body mass index is 36.79 kg/m  as calculated from the following:    Height as of 11/16/20: 1.772 m (5' 9.75\").    Weight as of 11/16/20: 115.5 kg (254 lb 9.6 oz).      GEN: NAD. Healthy appearing adult.   HEENT: MMM.  Anicteric, noninjected sclera. No obvious external lesions of the ear and nose. Hearing intact.  PULM: No increased work of breathing  MSK:  Hands and wrists without swelling.  Able to make a complete fist with each hand.  SKIN: No rash or jaundice seen  PSYCH: Alert. Appropriate.        Labs / Imaging (select studies)       CBC  Recent Labs   Lab Test 06/24/21  0721 12/16/20  1826 11/16/20  1514 10/15/20  1434   WBC 6.8 6.6 6.1 7.0   RBC 4.17 4.05 4.08 3.84   HGB 13.1 13.0 13.2 12.2   HCT 38.1 38.3 38.1 35.8   MCV 91 95 93 93   RDW 13.1 12.9 12.5 12.3    175 174 170   MCH 31.4 32.1 32.4 31.8   MCHC 34.4 33.9 34.6 34.1   NEUTROPHIL 49.0 45.7  --  44.7   LYMPH 40.5 43.1  --  44.8   MONOCYTE 8.3 10.4  --  10.0   EOSINOPHIL 1.8 0.3  --  0.1   BASOPHIL 0.4 0.5  --  0.4   ANEU 3.3 3.0  --  3.1   ALYM 2.7 2.8  --  3.1   FREDA 0.6 0.7  --  0.7   AEOS 0.1 0.0  --  0.0   ABAS 0.0 0.0  --  0.0     CMP  Recent Labs   Lab Test 06/24/21  0721 12/16/20  1826 10/15/20  1434 10/19/19  1041 10/19/19  1041 09/23/18  1058 09/23/18  1058 12/20/16  1138 12/20/16  1138   NA  --   --   --   --   --   --   --   --  138   POTASSIUM  --   --   --   --   --   --   --   --  4.4   CHLORIDE "  --   --   --   --   --   --   --   --  104   CO2  --   --   --   --   --   --   --   --  30   ANIONGAP  --   --   --   --   --   --   --   --  4   GLC  --   --   --   --  81  --  79  --  84   BUN  --   --   --   --   --   --   --   --  13   CR 1.03 0.99 0.97   < >  --    < >  --    < > 1.05*   GFRESTIMATED 68 71 73   < >  --    < >  --    < > 59*   GFRESTBLACK 79 83 85   < >  --    < >  --    < > 72   TATI  --   --   --   --   --   --   --   --  8.7   BILITOTAL 0.6 0.3 0.4   < >  --    < >  --    < >  --    ALBUMIN 3.3* 3.8 3.5   < >  --    < >  --    < >  --    PROTTOTAL 6.8 7.3 7.2   < >  --    < >  --    < >  --    ALKPHOS 47 57 45   < >  --    < >  --    < >  --    AST 17 16 19   < >  --    < >  --    < >  --    ALT 21 23 22   < >  --    < >  --    < >  --     < > = values in this interval not displayed.     Calcium/VitaminD  Recent Labs   Lab Test 12/20/16  1138 09/20/16  1554   TATI 8.7  --    VITDT  --  36     ESR/CRP  Recent Labs   Lab Test 06/24/21  0721 12/16/20  1826 06/16/20  1824   SED 9 7 7   CRP 16.2* 8.0 11.0*       From care everywhere:      Immunization History     Immunization History   Administered Date(s) Administered     HepB 07/22/1999     Historical DTP/aP 1980, 1980, 1980, 11/02/1982     Influenza (IIV3) PF 11/05/1996, 10/31/2019     Influenza Vaccine IM > 6 months Valent IIV4 10/29/2020     MMR 09/07/1992     OPV, trivalent, live 1981, 1980, 1980, 11/02/1982     Pneumo Conj 13-V (2010&after) 09/20/2016     Pneumococcal 23 valent 12/20/2016     TD (ADULT, 7+) 07/14/1995, 01/17/2006     Tdap (Adacel,Boostrix) 12/10/2013            Chart documentation done in part with Dragon Voice recognition Software. Although reviewed after completion, some word and grammatical error may remain.      Video-Visit Details    Type of service:  Video Visit    Video Start Time: 1:56 PM    Video End Time:2:03 PM    Originating Location (pt. Location):  [place]    Distant Location  (provider location):  Home    Platform used for Video Visit: Fabiano Benítez MD

## 2021-09-12 ENCOUNTER — HEALTH MAINTENANCE LETTER (OUTPATIENT)
Age: 41
End: 2021-09-12

## 2021-10-24 ENCOUNTER — LAB (OUTPATIENT)
Dept: LAB | Facility: CLINIC | Age: 41
End: 2021-10-24
Payer: COMMERCIAL

## 2021-10-24 DIAGNOSIS — Z79.899 HIGH RISK MEDICATION USE: ICD-10-CM

## 2021-10-24 DIAGNOSIS — M06.09 RHEUMATOID ARTHRITIS OF MULTIPLE SITES WITH NEGATIVE RHEUMATOID FACTOR (H): ICD-10-CM

## 2021-10-24 LAB
ALBUMIN SERPL-MCNC: 3.1 G/DL (ref 3.4–5)
ALP SERPL-CCNC: 40 U/L (ref 40–150)
ALT SERPL W P-5'-P-CCNC: 27 U/L (ref 0–50)
AST SERPL W P-5'-P-CCNC: 22 U/L (ref 0–45)
BASOPHILS # BLD AUTO: 0.1 10E3/UL (ref 0–0.2)
BASOPHILS NFR BLD AUTO: 1 %
BILIRUB DIRECT SERPL-MCNC: 0.1 MG/DL (ref 0–0.2)
BILIRUB SERPL-MCNC: 0.5 MG/DL (ref 0.2–1.3)
CREAT SERPL-MCNC: 1.04 MG/DL (ref 0.52–1.04)
CRP SERPL-MCNC: 6.7 MG/L (ref 0–8)
EOSINOPHIL # BLD AUTO: 0.1 10E3/UL (ref 0–0.7)
EOSINOPHIL NFR BLD AUTO: 2 %
ERYTHROCYTE [DISTWIDTH] IN BLOOD BY AUTOMATED COUNT: 14.1 % (ref 10–15)
ERYTHROCYTE [SEDIMENTATION RATE] IN BLOOD BY WESTERGREN METHOD: 7 MM/HR (ref 0–20)
GFR SERPL CREATININE-BSD FRML MDRD: 67 ML/MIN/1.73M2
HCT VFR BLD AUTO: 37.9 % (ref 35–47)
HGB BLD-MCNC: 13.3 G/DL (ref 11.7–15.7)
LYMPHOCYTES # BLD AUTO: 2.4 10E3/UL (ref 0.8–5.3)
LYMPHOCYTES NFR BLD AUTO: 41 %
MCH RBC QN AUTO: 31.2 PG (ref 26.5–33)
MCHC RBC AUTO-ENTMCNC: 35.1 G/DL (ref 31.5–36.5)
MCV RBC AUTO: 89 FL (ref 78–100)
MONOCYTES # BLD AUTO: 0.6 10E3/UL (ref 0–1.3)
MONOCYTES NFR BLD AUTO: 11 %
NEUTROPHILS # BLD AUTO: 2.8 10E3/UL (ref 1.6–8.3)
NEUTROPHILS NFR BLD AUTO: 46 %
PLATELET # BLD AUTO: 180 10E3/UL (ref 150–450)
PROT SERPL-MCNC: 6.7 G/DL (ref 6.8–8.8)
RBC # BLD AUTO: 4.26 10E6/UL (ref 3.8–5.2)
WBC # BLD AUTO: 6 10E3/UL (ref 4–11)

## 2021-10-24 PROCEDURE — 36415 COLL VENOUS BLD VENIPUNCTURE: CPT

## 2021-10-24 PROCEDURE — 85652 RBC SED RATE AUTOMATED: CPT

## 2021-10-24 PROCEDURE — 85025 COMPLETE CBC W/AUTO DIFF WBC: CPT

## 2021-10-24 PROCEDURE — 80076 HEPATIC FUNCTION PANEL: CPT

## 2021-10-24 PROCEDURE — 82565 ASSAY OF CREATININE: CPT

## 2021-10-24 PROCEDURE — 86140 C-REACTIVE PROTEIN: CPT

## 2021-10-26 ENCOUNTER — VIRTUAL VISIT (OUTPATIENT)
Dept: RHEUMATOLOGY | Facility: CLINIC | Age: 41
End: 2021-10-26
Payer: COMMERCIAL

## 2021-10-26 DIAGNOSIS — Z79.899 HIGH RISK MEDICATION USE: ICD-10-CM

## 2021-10-26 DIAGNOSIS — M06.09 RHEUMATOID ARTHRITIS OF MULTIPLE SITES WITH NEGATIVE RHEUMATOID FACTOR (H): Primary | ICD-10-CM

## 2021-10-26 PROCEDURE — 99214 OFFICE O/P EST MOD 30 MIN: CPT | Mod: GT | Performed by: INTERNAL MEDICINE

## 2021-10-26 RX ORDER — METHOTREXATE 2.5 MG/1
15 TABLET ORAL WEEKLY
Qty: 78 TABLET | Refills: 2 | Status: SHIPPED | OUTPATIENT
Start: 2021-10-26 | End: 2022-01-13

## 2021-10-26 RX ORDER — HYDROXYCHLOROQUINE SULFATE 200 MG/1
400 TABLET, FILM COATED ORAL DAILY
Qty: 180 TABLET | Refills: 1 | Status: SHIPPED | OUTPATIENT
Start: 2021-10-26 | End: 2022-01-13

## 2021-10-26 RX ORDER — FOLIC ACID 1 MG/1
1 TABLET ORAL DAILY
Qty: 90 TABLET | Refills: 2 | Status: SHIPPED | OUTPATIENT
Start: 2021-10-26 | End: 2022-01-13

## 2021-10-26 NOTE — PROGRESS NOTES
Vida Whitfield  is a 41 year old year old female who is being evaluated via a billable video visit.      How would you like to obtain your AVS? MyChart  If the video visit is dropped, the invitation should be resent by: Text to cell phone: [phone]  Will anyone else be joining your video visit? No      Rheumatology Video Visit      Vida Whitfield MRN# [id number]   YOB: 1980 Age: 41 year old      Date of visit: 10/26/21   PCP: Dr. Fco Medeiros    Chief Complaint   Patient presents with:  Rheumatoid Arthritis    Assessment and Plan     1. Seronegative nonerosive rheumatoid arthritis: Currently on methotrexate 15 mg once weekly (Cr elevation possibly secondary to MTX so MTX was reduced with improvement of Cr), folic acid 1 mg daily, and Adoxa chloroquine 400 mg daily.  Previously on sulfasalazine (1500mg BID effective, stopped when difficult to obtain due to sulfasalazine shortage; may use again in the future if needed).  Since stopping sulfasalazine and starting hydroxychloroquine she has had morning stiffness lasting for 2 hours in her ankles and feet.  Anticipate that the stiffness should improve with a longer duration of therapy but if not then could consider leflunomide, biologic DMARD, targeted synthetic DMARD, or reinitiation of sulfasalazine if sulfasalazine shortages improve.  Chronic illness, progressive  - Continue methotrexate 15mg once weekly  - Continue folic acid 1 mg daily  - Continue hydroxychloroquine 400mg daily  - Ophthalmology referral for hydroxychloroquine toxicity monitoring   - Avoid oral NSAIDs because they are historically triggers for urticaria; tolerates voltaren gel  - Labs in 3 months: CBC, Creatinine, Hepatic Panel, ESR, CRP    High risk medication requiring intensive toxicity monitoring at least quarterly: labs ordered include CBC, Creatinine, Hepatic panel to monitor for cytopenia and hepatotoxicity; checking creatinine as it affects clearance of medication.      #  On birth control per patient    2. Bilateral patellofemoral syndrome: Previous steroid injections have been effective.  Doing exercises and trying to lose weight; doing well today.      3. Urticaria: Historically, oral NSAIDs are triggers.  Documented here for clinical significance only.  Not managed in this clinic.    4. Bone health: 9/20/2016 Vitamin D level normal.     5.  Vaccinations: Vaccinations reviewed with Ms. Whitfield.  Risks and benefits of vaccinations were discussed.   I explained that Shingrix is used off label when under 50 years old and that the safety and efficacy of the vaccine has not been tested in people younger than 50 years old.   - Influenza: up to date per patient  - [id number]: up to date  - [id number]: up to date  - Shingrix: Advised receiving; patient is going to check on insurance coverage before determining if it is going to be received  - COVID-19: has received the Moderna vaccine on 8/18/2021 and 9/15/2021    A single additional dose of the Pfizer or Moderna COVID-19 vaccine is recommended at least 28 days after the completion of the 2-dose mRNA vaccine series for patients receiving any immunosuppressive or immunomodulatory therapy. Attempts should be made to match the additional mRNA dose type to the type given in the mRNA primary series; however, if that is not feasible, a booster dose with the alternative mRNA vaccine is permitted.    Based on our current understanding (and this may change over time as we learn more), medications should be adjusted as noted below, if disease activity allows:  - NSAIDs and Acetaminophen: hold for 24 hours prior to vaccination if able to do so  - Methotrexate should be held for 1-2 weeks after the mRNA COVID-19 booster vaccine     Total minutes spent in evaluation with patient, documentation, , and review of pertinent studies and chart notes: 15     Ms. Whitfield verbalized agreement with and understanding of the rational for the diagnosis and  treatment plan.  All questions were answered to best of my ability and the patient's satisfaction. Ms. Whitfield was advised to contact the clinic with any questions that may arise after the clinic visit.      Thank you for involving me in the care of the patient    Return to clinic: 3 months    HPI   Vida Whitfield is a 41 year old female with a medical history significant for anxiety, depression, chronic idiopathic urticaria, hyperlipidemia, hypothyroidism, rosacea, obstructive sleep apnea, obesity, and inflammatory arthritis who presents for follow-up of rheumatoid arthritis.    Historical records in this paragraph: She was previously evaluated by Dr. Blaine Anthony at [facility].  2014 labs show negative: hepatitis C ab, HBV surface ag, HBV core ab, HLA-B27, Sm, RNP, SSA, SSB, Scl-70, DNA, cardiolipin, CCP, PR3, MPO. NIKKIE 1:160 homogeneous.  MPO also positive (one negative and one positive value). Per a 3/11/2016 clinic note, she has seronegative rheumatoid arthritis and chronic urticaria. She developed joint swelling and stiffness in the bilateral MCPs, MTPs, ankles, and knees that started in November 2013 shortly after tapering off prednisone that was used to manage chronic urticaria. No history of psoriasis or inflammatory bowel disease. Negative SI joint x-rays. Negative MRI of the SI joint. Flaring of her urticaria with NSAIDs. Steroid responsive in regard to her joints. Near resolution of joint stiffness and pain with methotrexate. NSAIDs are avoided because they cause flares of her urticaria.    7/6/2021: Doing well at this time.  No joint pain or morning stiffness.  No gelling phenomenon.  Knees are doing well.  No joint swelling.  Tolerating medications but having difficulty getting sulfasalazine due to sulfasalazine shortages.    Today, 10/26/2021: Tolerating hydroxychloroquine and methotrexate well.  Not on sulfasalazine.  Morning stiffness in her ankles and feet for about 2 hours that is not disruptive  to her day, but longer than it had been previously.  No gelling.  No joint swelling.  No joint pain.    No fevers or chills.  No nausea or vomiting.  No blood in her stool.  No oral or nasal sores.  No chest pain/pressure, palpitations, or shortness of breath.  No cough.    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    ROS   12 point review of system was completed and negative except as noted in the HPI     Active Problem List     Patient Active Problem List   Diagnosis     Contraceptive management     Hyperhidrosis of axilla     CARDIOVASCULAR SCREENING; LDL GOAL LESS THAN 160     Generalized anxiety disorder     Moderate recurrent major depression (H)     Chronic idiopathic urticaria     Hyperlipidemia with target LDL less than 130     Hypothyroidism, unspecified hypothyroidism type     Rosacea     Non morbid obesity due to excess calories     BRIDGETTE (obstructive sleep apnea)     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 factor (H)     High risk medication use     Obesity (BMI 35.0-39.9) with comorbidity (H)     Past Medical History     Past Medical History:   Diagnosis Date     Arthritis 01/01/2013     Depressive disorder 01/01/2003     Peripheral autonomic neuropathy in disorders classified elsewhere(337.1)      Pure hypercholesterolemia      Thyroid disease 01/01/1995     Past Surgical History     Past Surgical History:   Procedure Laterality Date     C APPENDECTOMY  2002     CHOLECYSTECTOMY, LAPOROSCOPIC  2006    Cholecystectomy, Laparoscopic     SURGICAL HISTORY OF -   17 years old    Thyroid ablation     Allergy     Allergies   Allergen Reactions     Macrobid [Nitrofuran Derivatives]      Paxil [Paroxetine]      Anxiety         Prednisone      Vomiting      Zoloft      Mood changes     Current Medication List     Current Outpatient Medications   Medication Sig      ALPRAZolam (XANAX) 0.5 MG tablet Take 1 tablet (0.5 mg) by mouth 3 times daily as needed for anxiety     buPROPion (WELLBUTRIN XL) 150 MG 24 hr tablet Take 3 tablets by mouth once daily     busPIRone (BUSPAR) 15 MG tablet Take 1 tablet (15 mg) by mouth 2 times daily     cetirizine (ZYRTEC ALLERGY) 10 MG tablet Take 20 mg by mouth daily.     diclofenac (VOLTAREN) 1 % GEL topical gel Apply 2 grams to hands four times daily using enclosed dosing card.     folic acid (FOLVITE) 1 MG tablet Take 1 tablet (1 mg) by mouth daily     hydroxychloroquine (PLAQUENIL) 200 MG tablet Take 2 tablets (400 mg) by mouth daily     levonorgestrel-ethinyl estradiol (SEASONALE) 0.15-0.03 MG tablet Take 1 tablet by mouth daily     levothyroxine (SYNTHROID/LEVOTHROID) 175 MCG tablet Take 1 tablet (175 mcg) by mouth daily     methotrexate sodium 2.5 MG TABS Take 6 tablets (15 mg) by mouth once a week . Take all 6 tablets on the same day of each week.     Multiple Vitamin (MULTIVITAMIN) per tablet Take 1 tablet by mouth daily.     traZODone (DESYREL) 50 MG tablet Take 1-2 tablets by mouth at bedtime.     order for DME Equipment being ordered: CPAP 6-10 cm     No current facility-administered medications for this visit.         Social History   See HPI    Family History     Family History   Problem Relation Age of Onset     Diabetes Mother      Hypertension Mother      Lipids Mother      Hyperlipidemia Mother      Hypertension Father      Lipids Father      Thyroid Disease Father      Cancer Father      Hyperlipidemia Father      Other Cancer Father         Gastric & Bone Cancer     Diabetes Maternal Grandmother      Diabetes Paternal Grandmother      Melanoma No family hx of        Physical Exam     Temp Readings from Last 3 Encounters:   11/16/20 98.3  F (36.8  C) (Tympanic)   11/07/19 98.1  F (36.7  C) (Tympanic)   12/14/18 97.6  F (36.4  C) (Oral)     BP Readings from Last 5 Encounters:   11/16/20 (!) 120/92   12/20/19 129/82   11/07/19  "110/82   06/14/19 131/88   12/14/18 127/82     Pulse Readings from Last 1 Encounters:   11/16/20 93     Resp Readings from Last 1 Encounters:   11/16/20 16     Estimated body mass index is 36.79 kg/m  as calculated from the following:    Height as of 11/16/20: 1.772 m (5' 9.75\").    Weight as of 11/16/20: 115.5 kg (254 lb 9.6 oz).      GEN: NAD. Healthy appearing adult.   HEENT: MMM.  Anicteric, noninjected sclera. No obvious external lesions of the ear and nose. Hearing intact.  PULM: No increased work of breathing  MSK:  Hands and wrists without swelling.    SKIN: No rash or jaundice seen  PSYCH: Alert. Appropriate.        Labs / Imaging (select studies)     CBC  Recent Labs   Lab Test 10/24/21  1057 06/24/21  0721 12/16/20  1826 11/16/20  1514 10/15/20  1434   WBC 6.0 6.8 6.6   < > 7.0   RBC 4.26 4.17 4.05   < > 3.84   HGB 13.3 13.1 13.0   < > 12.2   HCT 37.9 38.1 38.3   < > 35.8   MCV 89 91 95   < > 93   RDW 14.1 13.1 12.9   < > 12.3    182 175   < > 170   MCH 31.2 31.4 32.1   < > 31.8   MCHC 35.1 34.4 33.9   < > 34.1   NEUTROPHIL 46 49.0 45.7  --  44.7   LYMPH 41 40.5 43.1  --  44.8   MONOCYTE 11 8.3 10.4  --  10.0   EOSINOPHIL 2 1.8 0.3  --  0.1   BASOPHIL 1 0.4 0.5  --  0.4   ANEU  --  3.3 3.0  --  3.1   ALYM  --  2.7 2.8  --  3.1   FREDA  --  0.6 0.7  --  0.7   AEOS  --  0.1 0.0  --  0.0   ABAS  --  0.0 0.0  --  0.0   ANEUTAUTO 2.8  --   --   --   --    ALYMPAUTO 2.4  --   --   --   --    AMONOAUTO 0.6  --   --   --   --    AEOSAUTO 0.1  --   --   --   --    ABSBASO 0.1  --   --   --   --     < > = values in this interval not displayed.     CMP  Recent Labs   Lab Test 10/24/21  1057 06/24/21  0721 12/16/20  1826 10/15/20  1434 10/15/20  1434 12/17/19  1640 10/19/19  1041 12/10/18  1900 09/23/18  1058 01/26/17  0745 12/20/16  1138   NA  --   --   --   --   --   --   --   --   --   --  138   POTASSIUM  --   --   --   --   --   --   --   --   --   --  4.4   CHLORIDE  --   --   --   --   --   --   --   --  "  --   --  104   CO2  --   --   --   --   --   --   --   --   --   --  30   ANIONGAP  --   --   --   --   --   --   --   --   --   --  4   GLC  --   --   --   --   --   --  81  --  79  --  84   BUN  --   --   --   --   --   --   --   --   --   --  13   CR 1.04 1.03 0.99   < > 0.97   < >  --    < >  --    < > 1.05*   GFRESTIMATED 67 68 71   < > 73   < >  --    < >  --    < > 59*   GFRESTBLACK  --  79 83  --  85   < >  --    < >  --    < > 72   TATI  --   --   --   --   --   --   --   --   --   --  8.7   BILITOTAL 0.5 0.6 0.3   < > 0.4   < >  --    < >  --    < >  --    ALBUMIN 3.1* 3.3* 3.8   < > 3.5   < >  --    < >  --    < >  --    PROTTOTAL 6.7* 6.8 7.3   < > 7.2   < >  --    < >  --    < >  --    ALKPHOS 40 47 57   < > 45   < >  --    < >  --    < >  --    AST 22 17 16   < > 19   < >  --    < >  --    < >  --    ALT 27 21 23   < > 22   < >  --    < >  --    < >  --     < > = values in this interval not displayed.     Calcium/VitaminD  Recent Labs   Lab Test 12/20/16  1138 09/20/16  1554   TATI 8.7  --    VITDT  --  36     ESR/CRP  Recent Labs   Lab Test 10/24/21  1057 06/24/21  0721 12/16/20  1826   SED 7 9 7   CRP 6.7 16.2* 8.0     Lipid Panel  Recent Labs   Lab Test 10/19/19  1041 09/23/18  1058 09/02/16  0736 06/19/14  0850 06/19/14  0850   CHOL 187 166 157   < > 159   TRIG 80 91 70   < > 113   HDL 58 54 57   < > 36*   * 94 86   < > 101   VLDL  --   --   --   --  23   CHOLHDLRATIO  --   --   --   --  4.0   NHDL 129 112 100  --   --     < > = values in this interval not displayed.         From care everywhere:      Immunization History     Immunization History   Administered Date(s) Administered     HepB 07/22/1999     Historical DTP/aP 1980, 1980, 1980, 11/02/1982     Influenza (IIV3) PF 11/05/1996, 10/31/2019     Influenza Vaccine IM > 6 months Valent IIV4 (AftabFluzone) 10/29/2020     MMR 09/07/1992     OPV, trivalent, live 1980, 1980, 1981, 11/02/1982     Pneumo  Conj 13-V (2010&after) 09/20/2016     Pneumococcal 23 valent 12/20/2016     TD (ADULT, 7+) 07/14/1995, 01/17/2006     Tdap (Adacel,Boostrix) 12/10/2013            Chart documentation done in part with Dragon Voice recognition Software. Although reviewed after completion, some word and grammatical error may remain.      Video-Visit Details    Type of service:  Video Visit    Video Start Time: 11:17 AM    Video End Time:11:23 AM    Originating Location (pt. Location): [place], [place]    Distant Location (provider location):  Home    Platform used for Video Visit: Fabiano Benítez MD

## 2021-10-26 NOTE — PATIENT INSTRUCTIONS
RHEUMATOLOGY    Dr. Andrea Benítez    [address] [place] [address]  Phone number: [phone]  Fax number: [phone]    Thank you for choosing [facility]!    Nicolette Sarmiento CMA Rheumatology

## 2021-11-03 DIAGNOSIS — E03.9 HYPOTHYROIDISM, UNSPECIFIED TYPE: ICD-10-CM

## 2021-11-04 NOTE — TELEPHONE ENCOUNTER
Routing refill request to provider for review/approval because:  Labs not current:    TSH   Date Value Ref Range Status   10/15/2020 0.37 (L) 0.40 - 4.00 mU/L Final     Has appointment with Dr. Jimenez on 11/15 but out of meds.  Ok to refill x 30 days?    Toya Mendiola RN

## 2021-11-08 DIAGNOSIS — F33.1 MODERATE RECURRENT MAJOR DEPRESSION (H): ICD-10-CM

## 2021-11-08 RX ORDER — LEVOTHYROXINE SODIUM 175 UG/1
175 TABLET ORAL DAILY
Qty: 30 TABLET | Refills: 0 | Status: SHIPPED | OUTPATIENT
Start: 2021-11-08 | End: 2021-11-15

## 2021-11-09 NOTE — TELEPHONE ENCOUNTER
"Requested Prescriptions   Pending Prescriptions Disp Refills     busPIRone (BUSPAR) 15 MG tablet 180 tablet 3     Sig: Take 1 tablet (15 mg) by mouth 2 times daily       Atypical Antidepressants Protocol Failed - 11/8/2021  5:02 PM        Failed - Patient has PHQ-9 score less than 5 in past 6 months.     Please review last PHQ-9 score.           Passed - Medication active on med list        Passed - Patient is age 18 or older        Passed - No active pregnancy on record        Passed - No positive pregnancy test in past 12 mos        Passed - Recent (6 mo) or future (30 days) visit within the authorizing provider's specialty     Patient had office visit in the last 6 months or has a visit in the next 30 days with authorizing provider or within the authorizing provider's specialty.  See \"Patient Info\" tab in inbasket, or \"Choose Columns\" in Meds & Orders section of the refill encounter.                 "

## 2021-11-10 RX ORDER — BUSPIRONE HYDROCHLORIDE 15 MG/1
15 TABLET ORAL 2 TIMES DAILY
Qty: 180 TABLET | Refills: 3 | Status: SHIPPED | OUTPATIENT
Start: 2021-11-10 | End: 2021-11-15

## 2021-11-15 ENCOUNTER — OFFICE VISIT (OUTPATIENT)
Dept: FAMILY MEDICINE | Facility: CLINIC | Age: 41
End: 2021-11-15
Payer: COMMERCIAL

## 2021-11-15 VITALS
SYSTOLIC BLOOD PRESSURE: 128 MMHG | TEMPERATURE: 98.1 F | DIASTOLIC BLOOD PRESSURE: 84 MMHG | WEIGHT: 264.2 LBS | BODY MASS INDEX: 37.82 KG/M2 | HEART RATE: 90 BPM | RESPIRATION RATE: 16 BRPM | HEIGHT: 70 IN | OXYGEN SATURATION: 98 %

## 2021-11-15 DIAGNOSIS — F33.1 MODERATE RECURRENT MAJOR DEPRESSION (H): ICD-10-CM

## 2021-11-15 DIAGNOSIS — Z76.89 ENCOUNTER TO ESTABLISH CARE: Primary | ICD-10-CM

## 2021-11-15 DIAGNOSIS — E03.9 HYPOTHYROIDISM, UNSPECIFIED TYPE: ICD-10-CM

## 2021-11-15 LAB — TSH SERPL DL<=0.005 MIU/L-ACNC: 0.97 MU/L (ref 0.4–4)

## 2021-11-15 PROCEDURE — 84443 ASSAY THYROID STIM HORMONE: CPT | Performed by: FAMILY MEDICINE

## 2021-11-15 PROCEDURE — 99214 OFFICE O/P EST MOD 30 MIN: CPT | Performed by: FAMILY MEDICINE

## 2021-11-15 PROCEDURE — 36415 COLL VENOUS BLD VENIPUNCTURE: CPT | Performed by: FAMILY MEDICINE

## 2021-11-15 RX ORDER — BUPROPION HYDROCHLORIDE 150 MG/1
TABLET ORAL
Qty: 270 TABLET | Refills: 3 | Status: SHIPPED | OUTPATIENT
Start: 2021-11-15 | End: 2022-01-12

## 2021-11-15 RX ORDER — LEVOTHYROXINE SODIUM 175 UG/1
175 TABLET ORAL DAILY
Qty: 90 TABLET | Refills: 3 | Status: SHIPPED | OUTPATIENT
Start: 2021-11-15 | End: 2022-01-12

## 2021-11-15 RX ORDER — BUSPIRONE HYDROCHLORIDE 15 MG/1
15 TABLET ORAL 2 TIMES DAILY
Qty: 180 TABLET | Refills: 3 | Status: SHIPPED | OUTPATIENT
Start: 2021-11-15 | End: 2022-01-12

## 2021-11-15 ASSESSMENT — ANXIETY QUESTIONNAIRES
5. BEING SO RESTLESS THAT IT IS HARD TO SIT STILL: NOT AT ALL
6. BECOMING EASILY ANNOYED OR IRRITABLE: NOT AT ALL
3. WORRYING TOO MUCH ABOUT DIFFERENT THINGS: NOT AT ALL
GAD7 TOTAL SCORE: 0
4. TROUBLE RELAXING: NOT AT ALL
8. IF YOU CHECKED OFF ANY PROBLEMS, HOW DIFFICULT HAVE THESE MADE IT FOR YOU TO DO YOUR WORK, TAKE CARE OF THINGS AT HOME, OR GET ALONG WITH OTHER PEOPLE?: NOT DIFFICULT AT ALL
7. FEELING AFRAID AS IF SOMETHING AWFUL MIGHT HAPPEN: NOT AT ALL
7. FEELING AFRAID AS IF SOMETHING AWFUL MIGHT HAPPEN: NOT AT ALL
GAD7 TOTAL SCORE: 0
2. NOT BEING ABLE TO STOP OR CONTROL WORRYING: NOT AT ALL
1. FEELING NERVOUS, ANXIOUS, OR ON EDGE: NOT AT ALL
GAD7 TOTAL SCORE: 0

## 2021-11-15 ASSESSMENT — PATIENT HEALTH QUESTIONNAIRE - PHQ9
SUM OF ALL RESPONSES TO PHQ QUESTIONS 1-9: 2
10. IF YOU CHECKED OFF ANY PROBLEMS, HOW DIFFICULT HAVE THESE PROBLEMS MADE IT FOR YOU TO DO YOUR WORK, TAKE CARE OF THINGS AT HOME, OR GET ALONG WITH OTHER PEOPLE: NOT DIFFICULT AT ALL
SUM OF ALL RESPONSES TO PHQ QUESTIONS 1-9: 2

## 2021-11-15 ASSESSMENT — MIFFLIN-ST. JEOR: SCORE: 1946.78

## 2021-11-15 NOTE — PROGRESS NOTES
Assessment & Plan     Moderate recurrent major depression (H)  Medication refilled.  - buPROPion (WELLBUTRIN XL) 150 MG 24 hr tablet; Take 3 tablets by mouth once daily  - busPIRone (BUSPAR) 15 MG tablet; Take 1 tablet (15 mg) by mouth 2 times daily    Hypothyroidism, unspecified type  Medication faxed.  - levothyroxine (SYNTHROID/LEVOTHROID) 175 MCG tablet; Take 1 tablet (175 mcg) by mouth daily  - TSH with free T4 reflex    Encounter to establish care  41 yr old here to establish care - no acute complaints medication filled.          FUTURE APPOINTMENTS:       - Follow-up visit in one month or sooner as needed.    Return in about 4 weeks (around 2021) for Follow up.    Martina Jimenez MD  [facility] is a 41 year [facility] who presents for the following health issues     HPI patient is a 41-year-old female presenting to the clinic to establish care.  She has a past medical history significant for depression, anxiety, hypothyroidism.  She also reports refills on all her medications.  She has no side effects to her medications and appears to be doing well on all of them.  No other complaints today.  Chief Complaint   Patient presents with     Thyroid Problem     Establish Care     Refill Request     Depression and Anxiety Follow-Up    How are you doing with your depression since your last visit? No change    How are you doing with your anxiety since your last visit?  No change    Are you having other symptoms that might be associated with depression or anxiety? No    Have you had a significant life event? No     Do you have any concerns with your use of alcohol or other drugs? No    Social History     Tobacco Use     Smoking status: Former Smoker     Types: Cigarettes     Start date: 1998     Quit date: 2001     Years since quittin.4     Smokeless tobacco: Never Used     Tobacco comment: not a smoker   Vaping Use     Vaping Use: Never used    Substance Use Topics     Alcohol use: Yes     Comment: rare     Drug use: No     PHQ 11/4/2019 11/16/2020 11/15/2021   PHQ-9 Total Score 11 7 2   Q9: Thoughts of better off dead/self-harm past 2 weeks Not at all Not at all Not at all     JANET-7 SCORE 10/1/2018 11/16/2020 11/15/2021   Total Score - - -   Total Score 0 (minimal anxiety) - 0 (minimal anxiety)   Total Score 0 4 0     Last PHQ-9 11/15/2021   1.  Little interest or pleasure in doing things 1   2.  Feeling down, depressed, or hopeless 0   3.  Trouble falling or staying asleep, or sleeping too much 0   4.  Feeling tired or having little energy 1   5.  Poor appetite or overeating 0   6.  Feeling bad about yourself 0   7.  Trouble concentrating 0   8.  Moving slowly or restless 0   Q9: Thoughts of better off dead/self-harm past 2 weeks 0   PHQ-9 Total Score 2   Difficulty at work, home, or with people -     JANET-7  11/15/2021   1. Feeling nervous, anxious, or on edge 0   2. Not being able to stop or control worrying 0   3. Worrying too much about different things 0   4. Trouble relaxing 0   5. Being so restless that it is hard to sit still 0   6. Becoming easily annoyed or irritable 0   7. Feeling afraid, as if something awful might happen 0   JANET-7 Total Score 0   If you checked any problems, how difficult have they made it for you to do your work, take care of things at home, or get along with other people? -         Hypothyroidism Follow-up      Since last visit, patient describes the following symptoms: Weight stable, no hair loss, no skin changes, no constipation, no loose stools      How many servings of fruits and vegetables do you eat daily?  2-3    On average, how many sweetened beverages do you drink each day (Examples: soda, juice, sweet tea, etc.  Do NOT count diet or artificially sweetened beverages)?   1    How many days per week do you exercise enough to make your heart beat faster? 3 or less    How many minutes a day do you exercise enough to  "make your heart beat faster? 20 - 29    How many days per week do you miss taking your medication? 0        Review of Systems   Constitutional, HEENT, cardiovascular, pulmonary, gi and gu systems are negative, except as otherwise noted.      Objective    /84 (BP Location: Left arm, Patient Position: Sitting, Cuff Size: Adult Large)   Pulse 90   Temp 98.1  F (36.7  C) (Tympanic)   Resp 16   Ht 1.775 m (5' 9.88\")   Wt 119.8 kg (264 lb 3.2 oz)   LMP 09/06/2021 (Exact Date)   SpO2 98%   Breastfeeding No   BMI 38.04 kg/m    Body mass index is 38.04 kg/m .  Physical Exam   GENERAL: healthy, alert and no distress  EYES: Eyes grossly normal to inspection, PERRL and conjunctivae and sclerae normal  HENT: ear canals and TM's normal, nose and mouth without ulcers or lesions  NECK: no adenopathy, no asymmetry, masses, or scars and thyroid normal to palpation  RESP: lungs clear to auscultation - no rales, rhonchi or wheezes  CV: regular rate and rhythm, normal S1 S2, no S3 or S4, no murmur, click or rub, no peripheral edema and peripheral pulses strong  ABDOMEN: soft, nontender, no hepatosplenomegaly, no masses and bowel sounds normal  MS: no gross musculoskeletal defects noted, no edema    Results for orders placed or performed in visit on 11/15/21 (from the past 24 hour(s))   TSH with free T4 reflex   Result Value Ref Range    TSH 0.97 0.40 - 4.00 mU/L               Answers for HPI/ROS submitted by the patient on 11/15/2021  If you checked off any problems, how difficult have these problems made it for you to do your work, take care of things at home, or get along with other people?: Not difficult at all  PHQ9 TOTAL SCORE: 2  JANET 7 TOTAL SCORE: 0      "

## 2021-11-15 NOTE — LETTER
November 17, 2021      Vida Whitfield  [id number]  [facility] [address]        Dear MsSangita,    We are writing to inform you of your test results.    Pinagraham Vida,  Your thyroid test result was within normal parameters.     Resulted Orders   TSH with free T4 reflex   Result Value Ref Range    TSH 0.97 0.40 - 4.00 mU/L       If you have any questions or concerns, please call the clinic at the number listed above.       Sincerely,      Martina Jimenez MD

## 2021-11-16 ASSESSMENT — PATIENT HEALTH QUESTIONNAIRE - PHQ9: SUM OF ALL RESPONSES TO PHQ QUESTIONS 1-9: 2

## 2021-11-16 ASSESSMENT — ANXIETY QUESTIONNAIRES: GAD7 TOTAL SCORE: 0

## 2021-11-16 NOTE — RESULT ENCOUNTER NOTE
Please inform patient that test result was within normal parameters.   Thank you.     Martina Jimenez M.D.

## 2021-11-30 DIAGNOSIS — Z00.00 ROUTINE GENERAL MEDICAL EXAMINATION AT A HEALTH CARE FACILITY: ICD-10-CM

## 2021-12-03 RX ORDER — LEVONORGESTREL AND ETHINYL ESTRADIOL 0.15-0.03
1 KIT ORAL DAILY
Qty: 90 TABLET | Refills: 3 | Status: SHIPPED | OUTPATIENT
Start: 2021-12-03 | End: 2022-01-12

## 2022-01-02 ENCOUNTER — HEALTH MAINTENANCE LETTER (OUTPATIENT)
Age: 42
End: 2022-01-02

## 2022-01-10 DIAGNOSIS — E03.9 HYPOTHYROIDISM, UNSPECIFIED TYPE: ICD-10-CM

## 2022-01-10 DIAGNOSIS — F33.1 MODERATE RECURRENT MAJOR DEPRESSION (H): ICD-10-CM

## 2022-01-10 DIAGNOSIS — Z00.00 ROUTINE GENERAL MEDICAL EXAMINATION AT A HEALTH CARE FACILITY: ICD-10-CM

## 2022-01-12 RX ORDER — BUSPIRONE HYDROCHLORIDE 15 MG/1
15 TABLET ORAL 2 TIMES DAILY
Qty: 180 TABLET | Refills: 3 | Status: SHIPPED | OUTPATIENT
Start: 2022-01-12 | End: 2022-12-23

## 2022-01-12 RX ORDER — TRAZODONE HYDROCHLORIDE 50 MG/1
TABLET, FILM COATED ORAL
Qty: 180 TABLET | Refills: 3 | Status: SHIPPED | OUTPATIENT
Start: 2022-01-12 | End: 2022-12-23

## 2022-01-12 RX ORDER — LEVOTHYROXINE SODIUM 175 UG/1
175 TABLET ORAL DAILY
Qty: 90 TABLET | Refills: 3 | Status: SHIPPED | OUTPATIENT
Start: 2022-01-12 | End: 2022-12-23

## 2022-01-12 RX ORDER — LEVONORGESTREL AND ETHINYL ESTRADIOL 0.15-0.03
1 KIT ORAL DAILY
Qty: 90 TABLET | Refills: 3 | Status: SHIPPED | OUTPATIENT
Start: 2022-01-12 | End: 2022-12-23

## 2022-01-12 RX ORDER — BUPROPION HYDROCHLORIDE 150 MG/1
TABLET ORAL
Qty: 270 TABLET | Refills: 3 | Status: SHIPPED | OUTPATIENT
Start: 2022-01-12 | End: 2022-12-23

## 2022-01-13 ENCOUNTER — TELEPHONE (OUTPATIENT)
Dept: RHEUMATOLOGY | Facility: CLINIC | Age: 42
End: 2022-01-13
Payer: COMMERCIAL

## 2022-01-13 DIAGNOSIS — M06.09 RHEUMATOID ARTHRITIS OF MULTIPLE SITES WITH NEGATIVE RHEUMATOID FACTOR (H): ICD-10-CM

## 2022-01-13 RX ORDER — HYDROXYCHLOROQUINE SULFATE 200 MG/1
400 TABLET, FILM COATED ORAL DAILY
Qty: 180 TABLET | Refills: 0 | Status: SHIPPED | OUTPATIENT
Start: 2022-01-13 | End: 2022-01-26

## 2022-01-13 RX ORDER — METHOTREXATE 2.5 MG/1
15 TABLET ORAL WEEKLY
Qty: 78 TABLET | Refills: 0 | Status: SHIPPED | OUTPATIENT
Start: 2022-01-13 | End: 2022-01-26

## 2022-01-13 RX ORDER — FOLIC ACID 1 MG/1
1 TABLET ORAL DAILY
Qty: 90 TABLET | Refills: 0 | Status: SHIPPED | OUTPATIENT
Start: 2022-01-13 | End: 2022-01-26

## 2022-01-13 NOTE — TELEPHONE ENCOUNTER
Sent prescriptions to [facility] pharmacy.    Hemanth MONZON RN....1/13/2022 12:04 PM

## 2022-01-13 NOTE — TELEPHONE ENCOUNTER
[facility] is requesting refill for folic acid, methotrexate, and hydroxychloroquin. Please fax to: [phone]

## 2022-01-22 ENCOUNTER — ANCILLARY PROCEDURE (OUTPATIENT)
Dept: MAMMOGRAPHY | Facility: CLINIC | Age: 42
End: 2022-01-22
Attending: FAMILY MEDICINE
Payer: COMMERCIAL

## 2022-01-22 DIAGNOSIS — Z12.31 VISIT FOR SCREENING MAMMOGRAM: ICD-10-CM

## 2022-01-22 PROCEDURE — 77067 SCR MAMMO BI INCL CAD: CPT | Mod: TC | Performed by: RADIOLOGY

## 2022-01-22 PROCEDURE — 77063 BREAST TOMOSYNTHESIS BI: CPT | Mod: TC | Performed by: RADIOLOGY

## 2022-01-25 ENCOUNTER — LAB (OUTPATIENT)
Dept: LAB | Facility: CLINIC | Age: 42
End: 2022-01-25
Payer: COMMERCIAL

## 2022-01-25 DIAGNOSIS — Z79.899 HIGH RISK MEDICATION USE: ICD-10-CM

## 2022-01-25 DIAGNOSIS — M06.09 RHEUMATOID ARTHRITIS OF MULTIPLE SITES WITH NEGATIVE RHEUMATOID FACTOR (H): ICD-10-CM

## 2022-01-25 LAB
ALBUMIN SERPL-MCNC: 3.3 G/DL (ref 3.4–5)
ALP SERPL-CCNC: 41 U/L (ref 40–150)
ALT SERPL W P-5'-P-CCNC: 24 U/L (ref 0–50)
AST SERPL W P-5'-P-CCNC: 19 U/L (ref 0–45)
BASOPHILS # BLD AUTO: 0.1 10E3/UL (ref 0–0.2)
BASOPHILS NFR BLD AUTO: 1 %
BILIRUB DIRECT SERPL-MCNC: 0.1 MG/DL (ref 0–0.2)
BILIRUB SERPL-MCNC: 0.4 MG/DL (ref 0.2–1.3)
CREAT SERPL-MCNC: 1.02 MG/DL (ref 0.52–1.04)
CRP SERPL-MCNC: 8.6 MG/L (ref 0–8)
EOSINOPHIL # BLD AUTO: 0.2 10E3/UL (ref 0–0.7)
EOSINOPHIL NFR BLD AUTO: 3 %
ERYTHROCYTE [DISTWIDTH] IN BLOOD BY AUTOMATED COUNT: 14.3 % (ref 10–15)
ERYTHROCYTE [SEDIMENTATION RATE] IN BLOOD BY WESTERGREN METHOD: 7 MM/HR (ref 0–20)
GFR SERPL CREATININE-BSD FRML MDRD: 71 ML/MIN/1.73M2
HCT VFR BLD AUTO: 38.6 % (ref 35–47)
HGB BLD-MCNC: 12.9 G/DL (ref 11.7–15.7)
LYMPHOCYTES # BLD AUTO: 2.8 10E3/UL (ref 0.8–5.3)
LYMPHOCYTES NFR BLD AUTO: 38 %
MCH RBC QN AUTO: 30.1 PG (ref 26.5–33)
MCHC RBC AUTO-ENTMCNC: 33.4 G/DL (ref 31.5–36.5)
MCV RBC AUTO: 90 FL (ref 78–100)
MONOCYTES # BLD AUTO: 0.7 10E3/UL (ref 0–1.3)
MONOCYTES NFR BLD AUTO: 10 %
NEUTROPHILS # BLD AUTO: 3.6 10E3/UL (ref 1.6–8.3)
NEUTROPHILS NFR BLD AUTO: 49 %
PLATELET # BLD AUTO: 187 10E3/UL (ref 150–450)
PROT SERPL-MCNC: 7 G/DL (ref 6.8–8.8)
RBC # BLD AUTO: 4.29 10E6/UL (ref 3.8–5.2)
WBC # BLD AUTO: 7.3 10E3/UL (ref 4–11)

## 2022-01-25 PROCEDURE — 80076 HEPATIC FUNCTION PANEL: CPT

## 2022-01-25 PROCEDURE — 86140 C-REACTIVE PROTEIN: CPT

## 2022-01-25 PROCEDURE — 85025 COMPLETE CBC W/AUTO DIFF WBC: CPT

## 2022-01-25 PROCEDURE — 36415 COLL VENOUS BLD VENIPUNCTURE: CPT

## 2022-01-25 PROCEDURE — 82565 ASSAY OF CREATININE: CPT

## 2022-01-25 PROCEDURE — 85652 RBC SED RATE AUTOMATED: CPT

## 2022-01-26 ENCOUNTER — VIRTUAL VISIT (OUTPATIENT)
Dept: RHEUMATOLOGY | Facility: CLINIC | Age: 42
End: 2022-01-26
Payer: COMMERCIAL

## 2022-01-26 DIAGNOSIS — M06.09 RHEUMATOID ARTHRITIS OF MULTIPLE SITES WITH NEGATIVE RHEUMATOID FACTOR (H): Primary | ICD-10-CM

## 2022-01-26 DIAGNOSIS — Z79.899 HIGH RISK MEDICATION USE: ICD-10-CM

## 2022-01-26 PROCEDURE — 99214 OFFICE O/P EST MOD 30 MIN: CPT | Mod: 95 | Performed by: INTERNAL MEDICINE

## 2022-01-26 RX ORDER — FOLIC ACID 1 MG/1
1 TABLET ORAL DAILY
Qty: 90 TABLET | Refills: 2 | Status: SHIPPED | OUTPATIENT
Start: 2022-01-26 | End: 2022-07-27

## 2022-01-26 RX ORDER — METHOTREXATE 2.5 MG/1
15 TABLET ORAL WEEKLY
Qty: 78 TABLET | Refills: 0 | Status: SHIPPED | OUTPATIENT
Start: 2022-01-26 | End: 2022-07-27

## 2022-01-26 RX ORDER — HYDROXYCHLOROQUINE SULFATE 200 MG/1
400 TABLET, FILM COATED ORAL DAILY
Qty: 180 TABLET | Refills: 1 | Status: SHIPPED | OUTPATIENT
Start: 2022-01-26 | End: 2022-07-27

## 2022-01-26 NOTE — PATIENT INSTRUCTIONS
RHEUMATOLOGY    Dr. Andrea Benítez    [address] [place] [address]  Phone number: [phone]  Fax number: [phone]      Thank you for choosing [facility]!    Nicolette Sarmiento CMA Rheumatology

## 2022-01-26 NOTE — RESULT ENCOUNTER NOTE
Please inform patient that test result mammogram was normal.   Thank you.     Martina Jimenez M.D.

## 2022-01-26 NOTE — PROGRESS NOTES
Vida Whitfield  is a 41 year old year old female who is being evaluated via a billable video visit.      How would you like to obtain your AVS? MyChart  If the video visit is dropped, the invitation should be resent by: Text to cell phone: [phone]   Will anyone else be joining your video visit? No    Rheumatology Video Visit      Vida Whitfield MRN# [id number]   YOB: 1980 Age: 41 year old      Date of visit: 1/26/22   PCP: Dr. Fco Medeiros  Ophthalmology: [facility] in [place], [place]    Chief Complaint   Patient presents with:  Rheumatoid Arthritis    Assessment and Plan     1. Seronegative nonerosive rheumatoid arthritis: Currently on methotrexate 15 mg once weekly (Cr elevation possibly secondary to MTX so MTX was reduced with improvement of Cr), folic acid 1 mg daily, and hydroxychloroquine 400 mg daily.  Previously on sulfasalazine (1500mg BID effective, stopped when difficult to obtain due to sulfasalazine shortage; may use again in the future if needed).  Doing well at this time.  Chronic illness, stable.    - Continue methotrexate 15mg once weekly  - Continue folic acid 1 mg daily  - Continue hydroxychloroquine 400mg daily  - Ophthalmology referral for hydroxychloroquine toxicity monitoring   - Avoid oral NSAIDs because they are historically triggers for urticaria; tolerates voltaren gel  - Labs in 3 months: CBC, Creatinine, Hepatic Panel  - Labs in 6 months: CBC, Creatinine, Hepatic Panel, ESR, CRP     High risk medication requiring intensive toxicity monitoring at least quarterly: labs ordered include CBC, Creatinine, Hepatic panel to monitor for cytopenia and hepatotoxicity; checking creatinine as it affects clearance of medication.      # On birth control per patient    2. Bilateral patellofemoral syndrome: Previous steroid injections have been effective.  Doing exercises and trying to lose weight; doing well today.      3. Urticaria: Historically, oral NSAIDs are triggers.   Documented here for clinical significance only.  Not managed in this clinic.    4. Bone health: 9/20/2016 Vitamin D level normal.     5.  Vaccinations: Vaccinations reviewed with Ms. Whitfield.  Risks and benefits of vaccinations were discussed.    - Influenza: up to date per patient  - Cyheahb22: up to date  - Acajyqlre10: up to date  - COVID-19: has received the Moderna vaccine on 8/18/2021 and 9/15/2021; a third dose of the mRNA COVID-19 vaccination is recommended to be received now.  A fourth mRNA COVID-19 vaccination should be received 5 months after the third dose.  Methotrexate should be held for 2 weeks after each COVID-19 vaccine dose.    Total minutes spent in evaluation with patient, documentation, , and review of pertinent studies and chart notes: 18     Ms. Whitfield verbalized agreement with and understanding of the rational for the diagnosis and treatment plan.  All questions were answered to best of my ability and the patient's satisfaction. Ms. Whitfield was advised to contact the clinic with any questions that may arise after the clinic visit.      Thank you for involving me in the care of the patient    Return to clinic: 6 months    HPI   Vida Whitfield is a 41 year old female with a medical history significant for anxiety, depression, chronic idiopathic urticaria, hyperlipidemia, hypothyroidism, rosacea, obstructive sleep apnea, obesity, and inflammatory arthritis who presents for follow-up of rheumatoid arthritis.    Historical records in this paragraph: She was previously evaluated by Dr. Blaine Anthony at [facility].  2014 labs show negative: hepatitis C ab, HBV surface ag, HBV core ab, HLA-B27, Sm, RNP, SSA, SSB, Scl-70, DNA, cardiolipin, CCP, PR3, MPO. NIKKIE 1:160 homogeneous.  MPO also positive (one negative and one positive value). Per a 3/11/2016 clinic note, she has seronegative rheumatoid arthritis and chronic urticaria. She developed joint swelling and stiffness in the bilateral MCPs,  MTPs, ankles, and knees that started in November 2013 shortly after tapering off prednisone that was used to manage chronic urticaria. No history of psoriasis or inflammatory bowel disease. Negative SI joint x-rays. Negative MRI of the SI joint. Flaring of her urticaria with NSAIDs. Steroid responsive in regard to her joints. Near resolution of joint stiffness and pain with methotrexate. NSAIDs are avoided because they cause flares of her urticaria.    Today, 1/26/2022: Doing well.  No joint pain or swelling.  No morning stiffness or gelling phenomenon.  Tolerating medications well.  Arthritis is not limiting any of her daily activities.  Happy with how well she is doing.    No fevers or chills.  No nausea or vomiting.  No blood in her stool.  No oral or nasal sores.  No chest pain/pressure, palpitations, or shortness of breath.  No cough.    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    ROS   12 point review of system was completed and negative except as noted in the HPI     Active Problem List     Patient Active Problem List   Diagnosis     Contraceptive management     Hyperhidrosis of axilla     CARDIOVASCULAR SCREENING; LDL GOAL LESS THAN 160     Generalized anxiety disorder     Moderate recurrent major depression (H)     Chronic idiopathic urticaria     Hyperlipidemia with target LDL less than 130     Hypothyroidism, unspecified hypothyroidism type     Rosacea     Non morbid obesity due to excess calories     BRIDGETTE (obstructive sleep apnea)     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 factor (H)     High risk medication use     Obesity (BMI 35.0-39.9) with comorbidity (H)     Past Medical History     Past Medical History:   Diagnosis Date     Arthritis 01/01/2013     Depressive disorder 01/01/2003     Peripheral autonomic neuropathy in disorders classified  elsewhere(337.1)      Pure hypercholesterolemia      Thyroid disease 01/01/1995     Past Surgical History     Past Surgical History:   Procedure Laterality Date     CHOLECYSTECTOMY, LAPOROSCOPIC  2006    Cholecystectomy, Laparoscopic     SURGICAL HISTORY OF -   17 years old    Thyroid ablation     ZZC APPENDECTOMY  2002     Allergy     Allergies   Allergen Reactions     Macrobid [Nitrofuran Derivatives]      Paxil [Paroxetine]      Anxiety         Prednisone      Vomiting      Zoloft      Mood changes     Current Medication List     Current Outpatient Medications   Medication Sig     ALPRAZolam (XANAX) 0.5 MG tablet Take 1 tablet (0.5 mg) by mouth 3 times daily as needed for anxiety     buPROPion (WELLBUTRIN XL) 150 MG 24 hr tablet Take 3 tablets by mouth once daily     busPIRone (BUSPAR) 15 MG tablet Take 1 tablet (15 mg) by mouth 2 times daily     cetirizine (ZYRTEC ALLERGY) 10 MG tablet Take 20 mg by mouth daily.     diclofenac (VOLTAREN) 1 % GEL topical gel Apply 2 grams to hands four times daily using enclosed dosing card.     folic acid (FOLVITE) 1 MG tablet Take 1 tablet (1 mg) by mouth daily     hydroxychloroquine (PLAQUENIL) 200 MG tablet Take 2 tablets (400 mg) by mouth daily     levonorgestrel-ethinyl estradiol (SEASONALE) 0.15-0.03 MG tablet Take 1 tablet by mouth daily     levothyroxine (SYNTHROID/LEVOTHROID) 175 MCG tablet Take 1 tablet (175 mcg) by mouth daily     methotrexate sodium 2.5 MG TABS Take 6 tablets (15 mg) by mouth once a week . Take all 6 tablets on the same day of each week.     Multiple Vitamin (MULTIVITAMIN) per tablet Take 1 tablet by mouth daily.     order for DME Equipment being ordered: CPAP 6-10 cm     traZODone (DESYREL) 50 MG tablet Take 1-2 tablets by mouth at bedtime.     No current facility-administered medications for this visit.         Social History   See HPI    Family History     Family History   Problem Relation Age of Onset     Diabetes Mother      Hypertension Mother   "    Lipids Mother      Hyperlipidemia Mother      Hypertension Father      Lipids Father      Thyroid Disease Father      Cancer Father      Hyperlipidemia Father      Other Cancer Father         Gastric & Bone Cancer     Diabetes Maternal Grandmother      Diabetes Paternal Grandmother      Melanoma No family hx of        Physical Exam     Temp Readings from Last 3 Encounters:   11/15/21 98.1  F (36.7  C) (Tympanic)   11/16/20 98.3  F (36.8  C) (Tympanic)   11/07/19 98.1  F (36.7  C) (Tympanic)     BP Readings from Last 5 Encounters:   11/15/21 128/84   11/16/20 (!) 120/92   12/20/19 129/82   11/07/19 110/82   06/14/19 131/88     Pulse Readings from Last 1 Encounters:   11/15/21 90     Resp Readings from Last 1 Encounters:   11/15/21 16     Estimated body mass index is 38.04 kg/m  as calculated from the following:    Height as of 11/15/21: 1.775 m (5' 9.88\").    Weight as of 11/15/21: 119.8 kg (264 lb 3.2 oz).    GEN: NAD. Healthy appearing adult.   HEENT: MMM.  Anicteric, noninjected sclera. No obvious external lesions of the ear and nose. Hearing intact.  PULM: No increased work of breathing  MSK:  Hands and wrists without swelling.  Able to make a complete fist with each hand.  SKIN: No rash or jaundice seen  PSYCH: Alert. Appropriate.      Labs / Imaging (select studies)       CBC  Recent Labs   Lab Test 01/25/22  1508 10/24/21  1057 06/24/21  0721 12/16/20  1826 11/16/20  1514 10/15/20  1434   WBC 7.3 6.0 6.8 6.6   < > 7.0   RBC 4.29 4.26 4.17 4.05   < > 3.84   HGB 12.9 13.3 13.1 13.0   < > 12.2   HCT 38.6 37.9 38.1 38.3   < > 35.8   MCV 90 89 91 95   < > 93   RDW 14.3 14.1 13.1 12.9   < > 12.3    180 182 175   < > 170   MCH 30.1 31.2 31.4 32.1   < > 31.8   MCHC 33.4 35.1 34.4 33.9   < > 34.1   NEUTROPHIL 49 46 49.0 45.7  --  44.7   LYMPH 38 41 40.5 43.1  --  44.8   MONOCYTE 10 11 8.3 10.4  --  10.0   EOSINOPHIL 3 2 1.8 0.3  --  0.1   BASOPHIL 1 1 0.4 0.5  --  0.4   ANEU  --   --  3.3 3.0  --  3.1   ALYM "  --   --  2.7 2.8  --  3.1   FREDA  --   --  0.6 0.7  --  0.7   AEOS  --   --  0.1 0.0  --  0.0   ABAS  --   --  0.0 0.0  --  0.0   ANEUTAUTO 3.6 2.8  --   --   --   --    ALYMPAUTO 2.8 2.4  --   --   --   --    AMONOAUTO 0.7 0.6  --   --   --   --    AEOSAUTO 0.2 0.1  --   --   --   --    ABSBASO 0.1 0.1  --   --   --   --     < > = values in this interval not displayed.     CMP  Recent Labs   Lab Test 01/25/22  1508 10/24/21  1057 06/24/21  0721 12/16/20  1826 10/15/20  1434 12/17/19  1640 10/19/19  1041 12/10/18  1900 09/23/18  1058 01/26/17  0745 12/20/16  1138   NA  --   --   --   --   --   --   --   --   --   --  138   POTASSIUM  --   --   --   --   --   --   --   --   --   --  4.4   CHLORIDE  --   --   --   --   --   --   --   --   --   --  104   CO2  --   --   --   --   --   --   --   --   --   --  30   ANIONGAP  --   --   --   --   --   --   --   --   --   --  4   GLC  --   --   --   --   --   --  81  --  79  --  84   BUN  --   --   --   --   --   --   --   --   --   --  13   CR 1.02 1.04 1.03 0.99 0.97   < >  --    < >  --    < > 1.05*   GFRESTIMATED 71 67 68 71 73   < >  --    < >  --    < > 59*   GFRESTBLACK  --   --  79 83 85   < >  --    < >  --    < > 72   TATI  --   --   --   --   --   --   --   --   --   --  8.7   BILITOTAL 0.4 0.5 0.6 0.3 0.4   < >  --    < >  --    < >  --    ALBUMIN 3.3* 3.1* 3.3* 3.8 3.5   < >  --    < >  --    < >  --    PROTTOTAL 7.0 6.7* 6.8 7.3 7.2   < >  --    < >  --    < >  --    ALKPHOS 41 40 47 57 45   < >  --    < >  --    < >  --    AST 19 22 17 16 19   < >  --    < >  --    < >  --    ALT 24 27 21 23 22   < >  --    < >  --    < >  --     < > = values in this interval not displayed.     Calcium/VitaminD  Recent Labs   Lab Test 12/20/16  1138 09/20/16  1554   TATI 8.7  --    VITDT  --  36     ESR/CRP  Recent Labs   Lab Test 01/25/22  1508 10/24/21  1057 06/24/21  0721   SED 7 7 9   CRP 8.6* 6.7 16.2*         From care everywhere:      Immunization History      Immunization History   Administered Date(s) Administered     COVID-19,PF,Moderna 08/18/2021, 09/15/2021     FLU 6-35 months 10/30/2006, 11/02/2007, 10/21/2008, 10/12/2009     Flu, Unspecified 10/22/2013, 10/03/2014     [id number] Novel Flu- Nasal 10/21/2009     HepB 07/22/1999     HepB-Adult 07/22/1999, 08/25/1999, 01/28/2000     Historical DTP/aP 1981, 1980, 1980, 11/02/1982     Influenza (IIV3) PF 11/05/1996, 10/19/2010, 10/31/2019     Influenza Vaccine IM > 6 months Valent IIV4 (Alfuria,Fluzone) 10/18/2018, 10/31/2019, 10/29/2020, 10/14/2021     MMR 09/07/1992, 08/05/1993     Mantoux Tuberculin Skin Test 02/03/2012, 04/04/2012, 09/30/2014, 10/07/2014     OPV, trivalent, live 1980, 1980, 1980, 11/02/1982     Pneumo Conj 13-V (2010&after) 09/20/2016     Pneumococcal 23 valent 12/20/2016     TD (ADULT, 7+) 07/14/1995, 01/17/2006     Tdap (Adacel,Boostrix) 12/10/2013            Chart documentation done in part with Dragon Voice recognition Software. Although reviewed after completion, some word and grammatical error may remain.      Video-Visit Details    Type of service:  Video Visit    Video Start Time: 12:57 PM    Video End Time:1:06 PM    Originating Location (pt. Location):  [place]    Distant Location (provider location):  Home    Platform used for Video Visit: Nancy Benítez MD

## 2022-04-12 ENCOUNTER — LAB (OUTPATIENT)
Dept: LAB | Facility: CLINIC | Age: 42
End: 2022-04-12
Payer: COMMERCIAL

## 2022-04-12 DIAGNOSIS — Z79.899 HIGH RISK MEDICATION USE: ICD-10-CM

## 2022-04-12 DIAGNOSIS — M06.09 RHEUMATOID ARTHRITIS OF MULTIPLE SITES WITH NEGATIVE RHEUMATOID FACTOR (H): ICD-10-CM

## 2022-04-12 LAB
ALBUMIN SERPL-MCNC: 3.4 G/DL (ref 3.4–5)
ALP SERPL-CCNC: 43 U/L (ref 40–150)
ALT SERPL W P-5'-P-CCNC: 28 U/L (ref 0–50)
AST SERPL W P-5'-P-CCNC: 24 U/L (ref 0–45)
BASOPHILS # BLD AUTO: 0.1 10E3/UL (ref 0–0.2)
BASOPHILS NFR BLD AUTO: 1 %
BILIRUB DIRECT SERPL-MCNC: 0.2 MG/DL (ref 0–0.2)
BILIRUB SERPL-MCNC: 0.4 MG/DL (ref 0.2–1.3)
CREAT SERPL-MCNC: 0.99 MG/DL (ref 0.52–1.04)
EOSINOPHIL # BLD AUTO: 0.1 10E3/UL (ref 0–0.7)
EOSINOPHIL NFR BLD AUTO: 2 %
ERYTHROCYTE [DISTWIDTH] IN BLOOD BY AUTOMATED COUNT: 14.1 % (ref 10–15)
GFR SERPL CREATININE-BSD FRML MDRD: 73 ML/MIN/1.73M2
HCT VFR BLD AUTO: 39.9 % (ref 35–47)
HGB BLD-MCNC: 13.6 G/DL (ref 11.7–15.7)
IMM GRANULOCYTES # BLD: 0 10E3/UL
IMM GRANULOCYTES NFR BLD: 0 %
LYMPHOCYTES # BLD AUTO: 2.2 10E3/UL (ref 0.8–5.3)
LYMPHOCYTES NFR BLD AUTO: 33 %
MCH RBC QN AUTO: 30.8 PG (ref 26.5–33)
MCHC RBC AUTO-ENTMCNC: 34.1 G/DL (ref 31.5–36.5)
MCV RBC AUTO: 90 FL (ref 78–100)
MONOCYTES # BLD AUTO: 0.5 10E3/UL (ref 0–1.3)
MONOCYTES NFR BLD AUTO: 8 %
NEUTROPHILS # BLD AUTO: 3.7 10E3/UL (ref 1.6–8.3)
NEUTROPHILS NFR BLD AUTO: 56 %
NRBC # BLD AUTO: 0 10E3/UL
NRBC BLD AUTO-RTO: 0 /100
PLATELET # BLD AUTO: 180 10E3/UL (ref 150–450)
PROT SERPL-MCNC: 6.9 G/DL (ref 6.8–8.8)
RBC # BLD AUTO: 4.42 10E6/UL (ref 3.8–5.2)
WBC # BLD AUTO: 6.6 10E3/UL (ref 4–11)

## 2022-04-12 PROCEDURE — 80076 HEPATIC FUNCTION PANEL: CPT

## 2022-04-12 PROCEDURE — 85025 COMPLETE CBC W/AUTO DIFF WBC: CPT

## 2022-04-12 PROCEDURE — 82565 ASSAY OF CREATININE: CPT

## 2022-04-12 PROCEDURE — 36415 COLL VENOUS BLD VENIPUNCTURE: CPT

## 2022-07-11 ENCOUNTER — TRANSFERRED RECORDS (OUTPATIENT)
Dept: HEALTH INFORMATION MANAGEMENT | Facility: CLINIC | Age: 42
End: 2022-07-11

## 2022-07-18 ENCOUNTER — TRANSFERRED RECORDS (OUTPATIENT)
Dept: RHEUMATOLOGY | Facility: CLINIC | Age: 42
End: 2022-07-18

## 2022-07-22 ENCOUNTER — LAB (OUTPATIENT)
Dept: LAB | Facility: CLINIC | Age: 42
End: 2022-07-22
Payer: COMMERCIAL

## 2022-07-22 DIAGNOSIS — M06.09 RHEUMATOID ARTHRITIS OF MULTIPLE SITES WITH NEGATIVE RHEUMATOID FACTOR (H): ICD-10-CM

## 2022-07-22 DIAGNOSIS — Z79.899 HIGH RISK MEDICATION USE: ICD-10-CM

## 2022-07-22 LAB
ALBUMIN SERPL-MCNC: 3.5 G/DL (ref 3.4–5)
ALP SERPL-CCNC: 42 U/L (ref 40–150)
ALT SERPL W P-5'-P-CCNC: 27 U/L (ref 0–50)
AST SERPL W P-5'-P-CCNC: 22 U/L (ref 0–45)
BASOPHILS # BLD AUTO: 0.1 10E3/UL (ref 0–0.2)
BASOPHILS NFR BLD AUTO: 1 %
BILIRUB DIRECT SERPL-MCNC: <0.1 MG/DL (ref 0–0.2)
BILIRUB SERPL-MCNC: 0.3 MG/DL (ref 0.2–1.3)
CREAT SERPL-MCNC: 0.87 MG/DL (ref 0.52–1.04)
CRP SERPL-MCNC: 7.8 MG/L (ref 0–8)
EOSINOPHIL # BLD AUTO: 0.4 10E3/UL (ref 0–0.7)
EOSINOPHIL NFR BLD AUTO: 5 %
ERYTHROCYTE [DISTWIDTH] IN BLOOD BY AUTOMATED COUNT: 13.5 % (ref 10–15)
ERYTHROCYTE [SEDIMENTATION RATE] IN BLOOD BY WESTERGREN METHOD: 8 MM/HR (ref 0–20)
GFR SERPL CREATININE-BSD FRML MDRD: 85 ML/MIN/1.73M2
HCT VFR BLD AUTO: 38.9 % (ref 35–47)
HGB BLD-MCNC: 13.3 G/DL (ref 11.7–15.7)
LYMPHOCYTES # BLD AUTO: 2.9 10E3/UL (ref 0.8–5.3)
LYMPHOCYTES NFR BLD AUTO: 39 %
MCH RBC QN AUTO: 30.7 PG (ref 26.5–33)
MCHC RBC AUTO-ENTMCNC: 34.2 G/DL (ref 31.5–36.5)
MCV RBC AUTO: 90 FL (ref 78–100)
MONOCYTES # BLD AUTO: 0.8 10E3/UL (ref 0–1.3)
MONOCYTES NFR BLD AUTO: 11 %
NEUTROPHILS # BLD AUTO: 3.3 10E3/UL (ref 1.6–8.3)
NEUTROPHILS NFR BLD AUTO: 45 %
PLATELET # BLD AUTO: 169 10E3/UL (ref 150–450)
PROT SERPL-MCNC: 7.1 G/DL (ref 6.8–8.8)
RBC # BLD AUTO: 4.33 10E6/UL (ref 3.8–5.2)
WBC # BLD AUTO: 7.4 10E3/UL (ref 4–11)

## 2022-07-22 PROCEDURE — 85025 COMPLETE CBC W/AUTO DIFF WBC: CPT

## 2022-07-22 PROCEDURE — 86140 C-REACTIVE PROTEIN: CPT

## 2022-07-22 PROCEDURE — 36415 COLL VENOUS BLD VENIPUNCTURE: CPT

## 2022-07-22 PROCEDURE — 80076 HEPATIC FUNCTION PANEL: CPT

## 2022-07-22 PROCEDURE — 85652 RBC SED RATE AUTOMATED: CPT

## 2022-07-22 PROCEDURE — 82565 ASSAY OF CREATININE: CPT

## 2022-07-26 NOTE — PROGRESS NOTES
Vida Whitfield  is a 42 year old year old who is being evaluated via a billable video visit.      How would you like to obtain your AVS? MyChart  If the video visit is dropped, the invitation should be resent by: Text to cell phone: [phone]   Will anyone else be joining your video visit? No     Rheumatology Video Visit      Vida Whitfield MRN# [id number]   YOB: 1980 Age: 42 year old      Date of visit: 7/27/22   PCP: Dr. Fco Medeiros  Ophthalmology: [facility] in [place], [place]    Chief Complaint   Patient presents with:  Rheumatoid Arthritis    Assessment and Plan     1. Seronegative nonerosive rheumatoid arthritis: Currently on methotrexate 15 mg once weekly (Cr elevation possibly secondary to MTX so MTX was reduced with improvement of Cr), folic acid 1 mg daily, and hydroxychloroquine 400 mg daily.  Previously on sulfasalazine (1500mg BID effective, stopped when difficult to obtain due to sulfasalazine shortage; may use again in the future if needed).  Doing well at this time.  Chronic illness, stable.    - Continue methotrexate 15mg once weekly  - Continue folic acid 1 mg daily  - Continue hydroxychloroquine 400mg daily   - Awaiting records from [facility] where she reportedly had a normal hydroxychloroquine toxicity monitoring eye exam in July 2022.  Note that we received records of the initial evaluation but not the hydroxychloroquine toxicity monitoring evaluation  - Avoid oral NSAIDs because they are historically triggers for urticaria; tolerates voltaren gel  - Labs in 3 months: CBC, Creatinine, Hepatic Panel  - Labs in 6 months: CBC, Creatinine, Hepatic Panel, ESR, CRP     High risk medication requiring intensive toxicity monitoring at least quarterly: labs ordered include CBC, Creatinine, Hepatic panel to monitor for cytopenia and hepatotoxicity; checking creatinine as it affects clearance of medication.      # On birth control per patient    2. Bilateral patellofemoral  syndrome: Previous steroid injections have been effective.  Doing exercises and trying to lose weight; doing well today.      3. Urticaria: Historically, oral NSAIDs are triggers.  Documented here for clinical significance only.  Not managed in this clinic.    4. Bone health: 9/20/2016 Vitamin D level normal.     5.  Vaccinations: Vaccinations reviewed with Ms. Whitfield.  Risks and benefits of vaccinations were discussed.    - Influenza: up to date per patient  - [id number]: up to date  - [id number]: up to date  - COVID-19: has received the Moderna vaccine on 8/18/2021 and 9/15/2021; A third dose of an mRNA COVID-19 vaccination is recommended to receive 28 days after the second mRNA COVID-19 vaccination was received; this 3rd dose should be from the same  as the first two doses, and will complete the initial vaccine series.   A 4th dose (1st booster) of the mRNA COVID-19 vaccination is recommended to be received 3 months after the third mRNA COVID-19 vaccination was received.  A 5th mRNA vaccine (2nd booster) may be received at least 4 months after the 4th dose.   Based on our current understanding (and this may change over time as we learn more), medications should be adjusted as noted below, if disease activity allows:  - NSAIDs and Acetaminophen: hold for 24 hours prior to vaccination if able to do so  - Methotrexate should be held for 1-2 weeks after each COVID-19 vaccine (as disease activity allows)      Total minutes spent in evaluation with patient, documentation, , and review of pertinent studies and chart notes: 11     Ms. Whitfield verbalized agreement with and understanding of the rational for the diagnosis and treatment plan.  All questions were answered to best of my ability and the patient's satisfaction. Ms. Whitfield was advised to contact the clinic with any questions that may arise after the clinic visit.      Thank you for involving me in the care of the patient    Return to clinic: 6  months    HPI   Vida Whitfield is a 42 year old female with a medical history significant for anxiety, depression, chronic idiopathic urticaria, hyperlipidemia, hypothyroidism, rosacea, obstructive sleep apnea, obesity, and inflammatory arthritis who presents for follow-up of rheumatoid arthritis.    Historical records in this paragraph: She was previously evaluated by Dr. Blaine Anthony at [facility].  2014 labs show negative: hepatitis C ab, HBV surface ag, HBV core ab, HLA-B27, Sm, RNP, SSA, SSB, Scl-70, DNA, cardiolipin, CCP, PR3, MPO. NIKKIE 1:160 homogeneous.  MPO also positive (one negative and one positive value). Per a 3/11/2016 clinic note, she has seronegative rheumatoid arthritis and chronic urticaria. She developed joint swelling and stiffness in the bilateral MCPs, MTPs, ankles, and knees that started in November 2013 shortly after tapering off prednisone that was used to manage chronic urticaria. No history of psoriasis or inflammatory bowel disease. Negative SI joint x-rays. Negative MRI of the SI joint. Flaring of her urticaria with NSAIDs. Steroid responsive in regard to her joints. Near resolution of joint stiffness and pain with methotrexate. NSAIDs are avoided because they cause flares of her urticaria.    Today, 7/27/2022: Doing well.  No joint pain or swelling.  No morning stiffness or joint phenomenon.  Tolerating medications well.  Happy with how well she is doing.  Arthritis is not limiting any of her daily activities.  Trying to lose weight to help her knees.    No fevers or chills.  No nausea or vomiting.  No blood in her stool.  No oral or nasal sores.  No chest pain/pressure, palpitations, or shortness of breath.  No cough.    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    ROS   12 point review of system was completed and negative except as noted in the HPI      Active Problem List     Patient Active Problem List   Diagnosis     Contraceptive management     Hyperhidrosis of axilla     CARDIOVASCULAR SCREENING; LDL GOAL LESS THAN 160     Generalized anxiety disorder     Moderate recurrent major depression (H)     Chronic idiopathic urticaria     Hyperlipidemia with target LDL less than 130     Hypothyroidism, unspecified hypothyroidism type     Rosacea     Non morbid obesity due to excess calories     BRIDGETTE (obstructive sleep apnea)     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 factor (H)     High risk medication use     Obesity (BMI 35.0-39.9) with comorbidity (H)     Past Medical History     Past Medical History:   Diagnosis Date     Arthritis 01/01/2013     Depressive disorder 01/01/2003     Peripheral autonomic neuropathy in disorders classified elsewhere(337.1)      Pure hypercholesterolemia      Thyroid disease 01/01/1995     Past Surgical History     Past Surgical History:   Procedure Laterality Date     CHOLECYSTECTOMY, LAPOROSCOPIC  2006    Cholecystectomy, Laparoscopic     SURGICAL HISTORY OF -   17 years old    Thyroid ablation     ZZC APPENDECTOMY  2002     Allergy     Allergies   Allergen Reactions     Macrobid [Nitrofuran Derivatives]      Paxil [Paroxetine]      Anxiety         Prednisone      Vomiting      Zoloft      Mood changes     Current Medication List     Current Outpatient Medications   Medication Sig     ALPRAZolam (XANAX) 0.5 MG tablet Take 1 tablet (0.5 mg) by mouth 3 times daily as needed for anxiety     buPROPion (WELLBUTRIN XL) 150 MG 24 hr tablet Take 3 tablets by mouth once daily     busPIRone (BUSPAR) 15 MG tablet Take 1 tablet (15 mg) by mouth 2 times daily     cetirizine (ZYRTEC) 10 MG tablet Take 20 mg by mouth daily.     diclofenac (VOLTAREN) 1 % GEL topical gel Apply 2 grams to hands four times daily using enclosed dosing card.     folic acid (FOLVITE) 1 MG tablet Take 1 tablet (1 mg) by mouth daily      "hydroxychloroquine (PLAQUENIL) 200 MG tablet Take 2 tablets (400 mg) by mouth daily     levonorgestrel-ethinyl estradiol (SEASONALE) 0.15-0.03 MG tablet Take 1 tablet by mouth daily     levothyroxine (SYNTHROID/LEVOTHROID) 175 MCG tablet Take 1 tablet (175 mcg) by mouth daily     methotrexate sodium 2.5 MG TABS Take 6 tablets (15 mg) by mouth once a week . Take all 6 tablets on the same day of each week.     Multiple Vitamin (MULTIVITAMIN) per tablet Take 1 tablet by mouth daily.     traZODone (DESYREL) 50 MG tablet Take 1-2 tablets by mouth at bedtime.     order for DME Equipment being ordered: CPAP 6-10 cm     No current facility-administered medications for this visit.         Social History   See HPI    Family History     Family History   Problem Relation Age of Onset     Diabetes Mother      Hypertension Mother      Lipids Mother      Hyperlipidemia Mother      Hypertension Father      Lipids Father      Thyroid Disease Father      Cancer Father      Hyperlipidemia Father      Other Cancer Father         Gastric & Bone Cancer     Diabetes Maternal Grandmother      Diabetes Paternal Grandmother      Melanoma No family hx of        Physical Exam     Temp Readings from Last 3 Encounters:   11/15/21 98.1  F (36.7  C) (Tympanic)   11/16/20 98.3  F (36.8  C) (Tympanic)   11/07/19 98.1  F (36.7  C) (Tympanic)     BP Readings from Last 5 Encounters:   11/15/21 128/84   11/16/20 (!) 120/92   12/20/19 129/82   11/07/19 110/82   06/14/19 131/88     Pulse Readings from Last 1 Encounters:   11/15/21 90     Resp Readings from Last 1 Encounters:   11/15/21 16     Estimated body mass index is 38.04 kg/m  as calculated from the following:    Height as of 11/15/21: 1.775 m (5' 9.88\").    Weight as of 11/15/21: 119.8 kg (264 lb 3.2 oz).    GEN: NAD. Healthy appearing adult.   HEENT:  Anicteric, noninjected sclera. No obvious external lesions of the ear and nose. Hearing intact.  PULM: No increased work of breathing  MSK:  Hands " and wrists without swelling.  Able to make a complete fist with each hand.  SKIN: No rash or jaundice seen  PSYCH: Alert. Appropriate.      Labs / Imaging (select studies)     CBC  Recent Labs   Lab Test 07/22/22  1528 04/12/22  1230 01/25/22  1508 10/24/21  1057 06/24/21  0721 12/16/20  1826 11/16/20  1514 10/15/20  1434   WBC 7.4 6.6 7.3   < > 6.8 6.6   < > 7.0   RBC 4.33 4.42 4.29   < > 4.17 4.05   < > 3.84   HGB 13.3 13.6 12.9   < > 13.1 13.0   < > 12.2   HCT 38.9 39.9 38.6   < > 38.1 38.3   < > 35.8   MCV 90 90 90   < > 91 95   < > 93   RDW 13.5 14.1 14.3   < > 13.1 12.9   < > 12.3    180 187   < > 182 175   < > 170   MCH 30.7 30.8 30.1   < > 31.4 32.1   < > 31.8   MCHC 34.2 34.1 33.4   < > 34.4 33.9   < > 34.1   NEUTROPHIL 45 56 49   < > 49.0 45.7  --  44.7   LYMPH 39 33 38   < > 40.5 43.1  --  44.8   MONOCYTE 11 8 10   < > 8.3 10.4  --  10.0   EOSINOPHIL 5 2 3   < > 1.8 0.3  --  0.1   BASOPHIL 1 1 1   < > 0.4 0.5  --  0.4   ANEU  --   --   --   --  3.3 3.0  --  3.1   ALYM  --   --   --   --  2.7 2.8  --  3.1   FREDA  --   --   --   --  0.6 0.7  --  0.7   AEOS  --   --   --   --  0.1 0.0  --  0.0   ABAS  --   --   --   --  0.0 0.0  --  0.0   ANEUTAUTO 3.3 3.7 3.6   < >  --   --   --   --    ALYMPAUTO 2.9 2.2 2.8   < >  --   --   --   --    AMONOAUTO 0.8 0.5 0.7   < >  --   --   --   --    AEOSAUTO 0.4 0.1 0.2   < >  --   --   --   --    ABSBASO 0.1 0.1 0.1   < >  --   --   --   --     < > = values in this interval not displayed.     CMP  Recent Labs   Lab Test 07/22/22  1528 04/12/22  1230 01/25/22  1508 10/24/21  1057 06/24/21  0721 12/16/20  1826 10/15/20  1434 12/17/19  1640 10/19/19  1041 12/10/18  1900 09/23/18  1058 01/26/17  0745 12/20/16  1138   NA  --   --   --   --   --   --   --   --   --   --   --   --  138   POTASSIUM  --   --   --   --   --   --   --   --   --   --   --   --  4.4   CHLORIDE  --   --   --   --   --   --   --   --   --   --   --   --  104   CO2  --   --   --   --   --   --    --   --   --   --   --   --  30   ANIONGAP  --   --   --   --   --   --   --   --   --   --   --   --  4   GLC  --   --   --   --   --   --   --   --  81  --  79  --  84   BUN  --   --   --   --   --   --   --   --   --   --   --   --  13   CR 0.87 0.99 1.02   < > 1.03 0.99 0.97   < >  --    < >  --    < > 1.05*   GFRESTIMATED 85 73 71   < > 68 71 73   < >  --    < >  --    < > 59*   GFRESTBLACK  --   --   --   --  79 83 85   < >  --    < >  --    < > 72   TATI  --   --   --   --   --   --   --   --   --   --   --   --  8.7   BILITOTAL 0.3 0.4 0.4   < > 0.6 0.3 0.4   < >  --    < >  --    < >  --    ALBUMIN 3.5 3.4 3.3*   < > 3.3* 3.8 3.5   < >  --    < >  --    < >  --    PROTTOTAL 7.1 6.9 7.0   < > 6.8 7.3 7.2   < >  --    < >  --    < >  --    ALKPHOS 42 43 41   < > 47 57 45   < >  --    < >  --    < >  --    AST 22 24 19   < > 17 16 19   < >  --    < >  --    < >  --    ALT 27 28 24   < > 21 23 22   < >  --    < >  --    < >  --     < > = values in this interval not displayed.     Calcium/VitaminD  Recent Labs   Lab Test 12/20/16  1138 09/20/16  1554   TATI 8.7  --    VITDT  --  36     ESR/CRP  Recent Labs   Lab Test 07/22/22  1528 01/25/22  1508 10/24/21  1057   SED 8 7 7   CRP 7.8 8.6* 6.7     Lipid Panel  Recent Labs   Lab Test 10/19/19  1041 09/23/18  1058 09/02/16  0736 06/19/14  0850   CHOL 187 166 157 159   TRIG 80 91 70 113   HDL 58 54 57 36*   * 94 86 101   VLDL  --   --   --  23   CHOLHDLRATIO  --   --   --  4.0   NHDL 129 112 100  --        From care everywhere:      Immunization History     Immunization History   Administered Date(s) Administered     COVID-19,PF,Moderna 08/18/2021, 09/15/2021     FLU 6-35 months 10/30/2006, 11/02/2007, 10/21/2008, 10/12/2009     Flu, Unspecified 10/22/2013, 10/03/2014     [id number] Novel Flu- Nasal 10/21/2009     HepB 07/22/1999     HepB-Adult 07/22/1999, 08/25/1999, 01/28/2000     Historical DTP/aP 1980, 1980, 1980, 11/02/1982     Influenza  (IIV3) PF 11/05/1996, 10/19/2010, 10/31/2019     Influenza Vaccine IM > 6 months Valent IIV4 (Alfuria,Fluzone) 10/18/2018, 10/31/2019, 10/29/2020, 10/14/2021     MMR 09/07/1992, 08/05/1993     Mantoux Tuberculin Skin Test 02/03/2012, 04/04/2012, 09/30/2014, 10/07/2014     OPV, trivalent, live 1980, 1980, 1980, 11/02/1982     Pneumo Conj 13-V (2010&after) 09/20/2016     Pneumococcal 23 valent 12/20/2016     TD (ADULT, 7+) 07/14/1995, 01/17/2006     Tdap (Adacel,Boostrix) 12/10/2013            Chart documentation done in part with Dragon Voice recognition Software. Although reviewed after completion, some word and grammatical error may remain.      Video-Visit Details    Type of service:  Video Visit    Video Start Time: 12:49 PM    Video End Time:12:54 PM    Originating Location (pt. Location):  [place]    Distant Location (provider location):  [place]    Platform used for Video Visit: Fabiano Benítez MD

## 2022-07-26 NOTE — PATIENT INSTRUCTIONS
RHEUMATOLOGY    Dr. Andrea Benítez    [address] [place] [address]  Phone number: [phone]  Fax number: [phone]      Thank you for choosing [facility]!    Nicolette Sarmiento CMA Rheumatology

## 2022-07-27 ENCOUNTER — VIRTUAL VISIT (OUTPATIENT)
Dept: RHEUMATOLOGY | Facility: CLINIC | Age: 42
End: 2022-07-27
Payer: COMMERCIAL

## 2022-07-27 DIAGNOSIS — Z79.899 HIGH RISK MEDICATION USE: ICD-10-CM

## 2022-07-27 DIAGNOSIS — M06.09 RHEUMATOID ARTHRITIS OF MULTIPLE SITES WITH NEGATIVE RHEUMATOID FACTOR (H): Primary | ICD-10-CM

## 2022-07-27 PROCEDURE — 99214 OFFICE O/P EST MOD 30 MIN: CPT | Mod: 95 | Performed by: INTERNAL MEDICINE

## 2022-07-27 RX ORDER — METHOTREXATE 2.5 MG/1
15 TABLET ORAL WEEKLY
Qty: 78 TABLET | Refills: 2 | Status: SHIPPED | OUTPATIENT
Start: 2022-07-27 | End: 2023-01-23

## 2022-07-27 RX ORDER — FOLIC ACID 1 MG/1
1 TABLET ORAL DAILY
Qty: 90 TABLET | Refills: 2 | Status: SHIPPED | OUTPATIENT
Start: 2022-07-27 | End: 2023-01-23

## 2022-07-27 RX ORDER — HYDROXYCHLOROQUINE SULFATE 200 MG/1
400 TABLET, FILM COATED ORAL DAILY
Qty: 180 TABLET | Refills: 2 | Status: SHIPPED | OUTPATIENT
Start: 2022-07-27 | End: 2023-01-23

## 2023-01-19 ENCOUNTER — LAB (OUTPATIENT)
Dept: LAB | Facility: CLINIC | Age: 43
End: 2023-01-19
Payer: COMMERCIAL

## 2023-01-19 DIAGNOSIS — Z79.899 HIGH RISK MEDICATION USE: ICD-10-CM

## 2023-01-19 DIAGNOSIS — M06.09 RHEUMATOID ARTHRITIS OF MULTIPLE SITES WITH NEGATIVE RHEUMATOID FACTOR (H): ICD-10-CM

## 2023-01-19 LAB
ALBUMIN SERPL BCG-MCNC: 3.8 G/DL (ref 3.5–5.2)
ALP SERPL-CCNC: 42 U/L (ref 35–104)
ALT SERPL W P-5'-P-CCNC: 21 U/L (ref 10–35)
AST SERPL W P-5'-P-CCNC: 24 U/L (ref 10–35)
BASOPHILS # BLD AUTO: 0 10E3/UL (ref 0–0.2)
BASOPHILS NFR BLD AUTO: 1 %
BILIRUB DIRECT SERPL-MCNC: <0.2 MG/DL (ref 0–0.3)
BILIRUB SERPL-MCNC: 0.4 MG/DL
CREAT SERPL-MCNC: 1.02 MG/DL (ref 0.51–0.95)
CRP SERPL-MCNC: 11.24 MG/L
EOSINOPHIL # BLD AUTO: 0.5 10E3/UL (ref 0–0.7)
EOSINOPHIL NFR BLD AUTO: 6 %
ERYTHROCYTE [DISTWIDTH] IN BLOOD BY AUTOMATED COUNT: 13.4 % (ref 10–15)
ERYTHROCYTE [SEDIMENTATION RATE] IN BLOOD BY WESTERGREN METHOD: 8 MM/HR (ref 0–20)
GFR SERPL CREATININE-BSD FRML MDRD: 71 ML/MIN/1.73M2
HCT VFR BLD AUTO: 37.5 % (ref 35–47)
HGB BLD-MCNC: 12.8 G/DL (ref 11.7–15.7)
LYMPHOCYTES # BLD AUTO: 3.3 10E3/UL (ref 0.8–5.3)
LYMPHOCYTES NFR BLD AUTO: 43 %
MCH RBC QN AUTO: 30.1 PG (ref 26.5–33)
MCHC RBC AUTO-ENTMCNC: 34.1 G/DL (ref 31.5–36.5)
MCV RBC AUTO: 88 FL (ref 78–100)
MONOCYTES # BLD AUTO: 0.7 10E3/UL (ref 0–1.3)
MONOCYTES NFR BLD AUTO: 9 %
NEUTROPHILS # BLD AUTO: 3.2 10E3/UL (ref 1.6–8.3)
NEUTROPHILS NFR BLD AUTO: 42 %
PLATELET # BLD AUTO: 174 10E3/UL (ref 150–450)
PROT SERPL-MCNC: 6.8 G/DL (ref 6.4–8.3)
RBC # BLD AUTO: 4.25 10E6/UL (ref 3.8–5.2)
WBC # BLD AUTO: 7.6 10E3/UL (ref 4–11)

## 2023-01-19 PROCEDURE — 85025 COMPLETE CBC W/AUTO DIFF WBC: CPT

## 2023-01-19 PROCEDURE — 80076 HEPATIC FUNCTION PANEL: CPT

## 2023-01-19 PROCEDURE — 36415 COLL VENOUS BLD VENIPUNCTURE: CPT

## 2023-01-19 PROCEDURE — 82565 ASSAY OF CREATININE: CPT

## 2023-01-19 PROCEDURE — 85652 RBC SED RATE AUTOMATED: CPT

## 2023-01-19 PROCEDURE — 86140 C-REACTIVE PROTEIN: CPT

## 2023-01-23 ENCOUNTER — VIRTUAL VISIT (OUTPATIENT)
Dept: RHEUMATOLOGY | Facility: CLINIC | Age: 43
End: 2023-01-23
Payer: COMMERCIAL

## 2023-01-23 DIAGNOSIS — M06.09 RHEUMATOID ARTHRITIS OF MULTIPLE SITES WITH NEGATIVE RHEUMATOID FACTOR (H): Primary | ICD-10-CM

## 2023-01-23 PROCEDURE — 99214 OFFICE O/P EST MOD 30 MIN: CPT | Mod: 95 | Performed by: INTERNAL MEDICINE

## 2023-01-23 RX ORDER — HYDROXYCHLOROQUINE SULFATE 200 MG/1
400 TABLET, FILM COATED ORAL DAILY
Qty: 180 TABLET | Refills: 2 | Status: SHIPPED | OUTPATIENT
Start: 2023-01-23 | End: 2023-07-21

## 2023-01-23 RX ORDER — METHOTREXATE 2.5 MG/1
15 TABLET ORAL WEEKLY
Qty: 78 TABLET | Refills: 2 | Status: SHIPPED | OUTPATIENT
Start: 2023-01-23 | End: 2023-07-21

## 2023-01-23 RX ORDER — FOLIC ACID 1 MG/1
1 TABLET ORAL DAILY
Qty: 90 TABLET | Refills: 2 | Status: SHIPPED | OUTPATIENT
Start: 2023-01-23 | End: 2023-07-21

## 2023-01-23 RX ORDER — PREDNISONE 5 MG/1
TABLET ORAL
Qty: 21 TABLET | Refills: 0 | Status: SHIPPED | OUTPATIENT
Start: 2023-01-23 | End: 2023-02-06

## 2023-01-23 NOTE — PROGRESS NOTES
Vida Whitfield  is a 42 year old year old who is being evaluated via a billable video visit.      How would you like to obtain your AVS? MyChart  If the video visit is dropped, the invitation should be resent by: Text to cell phone: [phone]   Will anyone else be joining your video visit? No     Rheumatology Video Visit      Vida Whitfield MRN# [id number]   YOB: 1980 Age: 42 year old      Date of visit: 1/23/23   PCP: Dr. Fco Medeiros  Ophthalmology: [facility] in [place], [place]    Chief Complaint   Patient presents with:  Rheumatoid Arthritis    Assessment and Plan     1. Seronegative nonerosive rheumatoid arthritis: Currently on methotrexate 15 mg once weekly (Cr elevation possibly secondary to MTX so MTX was reduced with improvement of Cr), folic acid 1 mg daily, and hydroxychloroquine 400 mg daily.  Previously on sulfasalazine (1500mg BID effective, stopped when difficult to obtain due to sulfasalazine shortage; may use again in the future if needed).  Was doing well until about 2 weeks ago where she started to have more pain of the left ankle and bilateral PIPs>MCPs that is worse in the morning and improves with time and activity.  She attributes increased joint pain to weather changes.  Distribution and quality of pain is consistent with RA.  Advised using a course of prednisone and if this is not effective at alleviating her pain then will consider restarting sulfasalazine.  Note that prednisone was in her allergy list and she says that it is not an allergy; the side effect in the allergy list as it causes GI upset and she says that that is alleviated by eating whenever she takes prednisone; Vida asked for prednisone to be removed from her allergy.  Chronic illness, progressive.      - Continue methotrexate 15mg once weekly  - Continue folic acid 1 mg daily  - Continue hydroxychloroquine 400mg daily (last eye exam performed at [facility] on 7/18/2022)  - Start prednisone 10 mg  daily x7 days, then 5 mg daily x7 days, then stop  - Avoid oral NSAIDs because they are historically triggers for urticaria; tolerates voltaren gel  - Labs in 3 months: CBC, Creatinine, Hepatic Panel, ESR, CRP    High risk medication requiring intensive toxicity monitoring at least quarterly: labs ordered include CBC, Creatinine, Hepatic panel to monitor for cytopenia and hepatotoxicity; checking creatinine as it affects clearance of medication.      # Prednisone Risks and Benefits: The risks and benefits of prednisone were discussed in detail and the patient verbalized understanding.  The risks discussed include, but are not limited to, weight gain, fluid retention, impaired wound healing, hyperglycemia, adrenal suppression, GI upset, peptic ulcer, hepatotoxicity, aseptic necrosis of the femoral and humeral heads, osteoporosis, myopathy, tendon rupture (particularly Achilles tendon), ocular changes including an increased intraocular pressure.  I encouraged reviewing the package insert and asking any questions about the medication.      # On birth control per patient    2. Bilateral patellofemoral syndrome: Previous steroid injections have been effective.  Doing exercises and trying to lose weight; doing well today.      3. Urticaria: Historically, oral NSAIDs are triggers.  Documented here for clinical significance only.  Not managed in this clinic.    4. Bone health: 9/20/2016 Vitamin D level normal.     5.  Vaccinations: Vaccinations reviewed with Ms. Whitfield.  Risks and benefits of vaccinations were discussed.    - Influenza: up to date per patient  - [id number]: up to date  - [id number]: up to date  - COVID-19: Up to date, including the updated bivalent COVID-19 vaccination      Total minutes spent in evaluation with patient, documentation, , and review of pertinent studies and chart notes: 18     Ms. Nahid verbalized agreement with and understanding of the rational for the diagnosis and treatment plan.   All questions were answered to best of my ability and the patient's satisfaction. Ms. Whitfield was advised to contact the clinic with any questions that may arise after the clinic visit.      Thank you for involving me in the care of the patient    Return to clinic: 6 months    HPI   Vida Whitfield is a 42 year old female with a medical history significant for anxiety, depression, chronic idiopathic urticaria, hyperlipidemia, hypothyroidism, rosacea, obstructive sleep apnea, obesity, and inflammatory arthritis who presents for follow-up of rheumatoid arthritis.    Historical records in this paragraph: She was previously evaluated by Dr. Blaine Anthony at [facility].  2014 labs show negative: hepatitis C ab, HBV surface ag, HBV core ab, HLA-B27, Sm, RNP, SSA, SSB, Scl-70, DNA, cardiolipin, CCP, PR3, MPO. NIKKIE 1:160 homogeneous.  MPO also positive (one negative and one positive value). Per a 3/11/2016 clinic note, she has seronegative rheumatoid arthritis and chronic urticaria. She developed joint swelling and stiffness in the bilateral MCPs, MTPs, ankles, and knees that started in November 2013 shortly after tapering off prednisone that was used to manage chronic urticaria. No history of psoriasis or inflammatory bowel disease. Negative SI joint x-rays. Negative MRI of the SI joint. Flaring of her urticaria with NSAIDs. Steroid responsive in regard to her joints. Near resolution of joint stiffness and pain with methotrexate. NSAIDs are avoided because they cause flares of her urticaria.    Today, 1/23/2023: In the last 1 week she has had more pain at the PIPs > MCPs > left ankle.  No swelling of these joints.  Morning stiffness for about 1 hour.  Pain and stiffness is worse in the morning and improves with time and activity.  She attributes the increased joint pain to the weather.    No fevers or chills.  No nausea or vomiting.  No blood in her stool.  No oral or nasal sores.  No chest pain/pressure, palpitations, or  shortness of breath.  No cough.    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    ROS   12 point review of system was completed and negative except as noted in the HPI     Active Problem List     Patient Active Problem List   Diagnosis     Contraceptive management     Hyperhidrosis of axilla     CARDIOVASCULAR SCREENING; LDL GOAL LESS THAN 160     Generalized anxiety disorder     Moderate recurrent major depression (H)     Chronic idiopathic urticaria     Hyperlipidemia with target LDL less than 130     Hypothyroidism, unspecified hypothyroidism type     Rosacea     Non morbid obesity due to excess calories     BRIDGETTE (obstructive sleep apnea)     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 factor (H)     High risk medication use     Obesity (BMI 35.0-39.9) with comorbidity (H)     Past Medical History     Past Medical History:   Diagnosis Date     Arthritis 01/01/2013     Depressive disorder 01/01/2003     Peripheral autonomic neuropathy in disorders classified elsewhere(337.1)      Pure hypercholesterolemia      Thyroid disease 01/01/1995     Past Surgical History     Past Surgical History:   Procedure Laterality Date     CHOLECYSTECTOMY, LAPOROSCOPIC  2006    Cholecystectomy, Laparoscopic     SURGICAL HISTORY OF -   17 years old    Thyroid ablation     ZZC APPENDECTOMY  2002     Allergy     Allergies   Allergen Reactions     Macrobid [Nitrofuran Derivatives]      Paxil [Paroxetine]      Anxiety         Prednisone      Vomiting      Zoloft      Mood changes     Current Medication List     Current Outpatient Medications   Medication Sig     ALPRAZolam (XANAX) 0.5 MG tablet Take 1 tablet (0.5 mg) by mouth 3 times daily as needed for anxiety     buPROPion (WELLBUTRIN XL) 150 MG 24 hr tablet Take 3 tablets by mouth once daily Provider visit due for further refills      busPIRone (BUSPAR) 15 MG tablet Take 1 tablet (15mg) by mouth twice daily. Provider visit due for further refills     cetirizine (ZYRTEC) 10 MG tablet Take 20 mg by mouth daily.     diclofenac (VOLTAREN) 1 % GEL topical gel Apply 2 grams to hands four times daily using enclosed dosing card.     folic acid (FOLVITE) 1 MG tablet Take 1 tablet (1 mg) by mouth daily     hydroxychloroquine (PLAQUENIL) 200 MG tablet Take 2 tablets (400 mg) by mouth daily     levonorgestrel-ethinyl estradiol (SEASONALE) 0.15-0.03 MG tablet Take 1 tablet by mouth daily Provider visit due for further refills     levothyroxine (SYNTHROID/LEVOTHROID) 175 MCG tablet Take 1 tablet (175 mcg) by mouth daily Provider visit due for further refills     methotrexate sodium 2.5 MG TABS Take 6 tablets (15 mg) by mouth once a week . Take all 6 tablets on the same day of each week.     Multiple Vitamin (MULTIVITAMIN) per tablet Take 1 tablet by mouth daily.     traZODone (DESYREL) 50 MG tablet Take 1 to 2 tablets by mouth nightly at bedtime. Provider visit due for further refills     order for DME Equipment being ordered: CPAP 6-10 cm     No current facility-administered medications for this visit.         Social History   See HPI    Family History     Family History   Problem Relation Age of Onset     Diabetes Mother      Hypertension Mother      Lipids Mother      Hyperlipidemia Mother      Hypertension Father      Lipids Father      Thyroid Disease Father      Cancer Father      Hyperlipidemia Father      Other Cancer Father         Gastric & Bone Cancer     Diabetes Maternal Grandmother      Diabetes Paternal Grandmother      Melanoma No family hx of        Physical Exam     Temp Readings from Last 3 Encounters:   11/15/21 98.1  F (36.7  C) (Tympanic)   11/16/20 98.3  F (36.8  C) (Tympanic)   11/07/19 98.1  F (36.7  C) (Tympanic)     BP Readings from Last 5 Encounters:   11/15/21 128/84   11/16/20 (!) 120/92   12/20/19 129/82   11/07/19 110/82  "  06/14/19 131/88     Pulse Readings from Last 1 Encounters:   11/15/21 90     Resp Readings from Last 1 Encounters:   11/15/21 16     Estimated body mass index is 38.04 kg/m  as calculated from the following:    Height as of 11/15/21: 1.775 m (5' 9.88\").    Weight as of 11/15/21: 119.8 kg (264 lb 3.2 oz).    GEN: NAD. Healthy appearing adult.   HEENT:  Anicteric, noninjected sclera. No obvious external lesions of the ear and nose. Hearing intact.  PULM: No increased work of breathing  MSK:  Hands and wrists without swelling.  Able to make a complete fist with each hand.  SKIN: No rash or jaundice seen  PSYCH: Alert. Appropriate.      Labs / Imaging (select studies)       CBC  Recent Labs   Lab Test 01/19/23  1615 07/22/22  1528 04/12/22  1230 10/24/21  1057 06/24/21  0721 12/16/20  1826 11/16/20  1514 10/15/20  1434   WBC 7.6 7.4 6.6   < > 6.8 6.6   < > 7.0   RBC 4.25 4.33 4.42   < > 4.17 4.05   < > 3.84   HGB 12.8 13.3 13.6   < > 13.1 13.0   < > 12.2   HCT 37.5 38.9 39.9   < > 38.1 38.3   < > 35.8   MCV 88 90 90   < > 91 95   < > 93   RDW 13.4 13.5 14.1   < > 13.1 12.9   < > 12.3    169 180   < > 182 175   < > 170   MCH 30.1 30.7 30.8   < > 31.4 32.1   < > 31.8   MCHC 34.1 34.2 34.1   < > 34.4 33.9   < > 34.1   NEUTROPHIL 42 45 56   < > 49.0 45.7  --  44.7   LYMPH 43 39 33   < > 40.5 43.1  --  44.8   MONOCYTE 9 11 8   < > 8.3 10.4  --  10.0   EOSINOPHIL 6 5 2   < > 1.8 0.3  --  0.1   BASOPHIL 1 1 1   < > 0.4 0.5  --  0.4   ANEU  --   --   --   --  3.3 3.0  --  3.1   ALYM  --   --   --   --  2.7 2.8  --  3.1   FREDA  --   --   --   --  0.6 0.7  --  0.7   AEOS  --   --   --   --  0.1 0.0  --  0.0   ABAS  --   --   --   --  0.0 0.0  --  0.0   ANEUTAUTO 3.2 3.3 3.7   < >  --   --   --   --    ALYMPAUTO 3.3 2.9 2.2   < >  --   --   --   --    AMONOAUTO 0.7 0.8 0.5   < >  --   --   --   --    AEOSAUTO 0.5 0.4 0.1   < >  --   --   --   --    ABSBASO 0.0 0.1 0.1   < >  --   --   --   --     < > = values in this " interval not displayed.     CMP  Recent Labs   Lab Test 01/19/23  1615 07/22/22  1528 04/12/22  1230 10/24/21  1057 06/24/21  0721 12/16/20  1826 10/15/20  1434 12/17/19  1640 10/19/19  1041 12/10/18  1900 09/23/18  1058 01/26/17  0745 12/20/16  1138   NA  --   --   --   --   --   --   --   --   --   --   --   --  138   POTASSIUM  --   --   --   --   --   --   --   --   --   --   --   --  4.4   CHLORIDE  --   --   --   --   --   --   --   --   --   --   --   --  104   CO2  --   --   --   --   --   --   --   --   --   --   --   --  30   ANIONGAP  --   --   --   --   --   --   --   --   --   --   --   --  4   GLC  --   --   --   --   --   --   --   --  81  --  79  --  84   BUN  --   --   --   --   --   --   --   --   --   --   --   --  13   CR 1.02* 0.87 0.99   < > 1.03 0.99 0.97   < >  --    < >  --    < > 1.05*   GFRESTIMATED 71 85 73   < > 68 71 73   < >  --    < >  --    < > 59*   GFRESTBLACK  --   --   --   --  79 83 85   < >  --    < >  --    < > 72   TATI  --   --   --   --   --   --   --   --   --   --   --   --  8.7   BILITOTAL 0.4 0.3 0.4   < > 0.6 0.3 0.4   < >  --    < >  --    < >  --    ALBUMIN 3.8 3.5 3.4   < > 3.3* 3.8 3.5   < >  --    < >  --    < >  --    PROTTOTAL 6.8 7.1 6.9   < > 6.8 7.3 7.2   < >  --    < >  --    < >  --    ALKPHOS 42 42 43   < > 47 57 45   < >  --    < >  --    < >  --    AST 24 22 24   < > 17 16 19   < >  --    < >  --    < >  --    ALT 21 27 28   < > 21 23 22   < >  --    < >  --    < >  --     < > = values in this interval not displayed.     Calcium/VitaminD  Recent Labs   Lab Test 12/20/16  1138 09/20/16  1554   TATI 8.7  --    VITDT  --  36     ESR/CRP  Recent Labs   Lab Test 01/19/23  1615 07/22/22  1528 01/25/22  1508   SED 8 8 7   CRP 11.24* 7.8 8.6*     Lipid Panel  Recent Labs   Lab Test 10/19/19  1041 09/23/18  1058 09/02/16  0736   CHOL 187 166 157   TRIG 80 91 70   HDL 58 54 57   * 94 86   NHDL 129 112 100          --------------------------------------------------    From care everywhere:      Immunization History     Immunization History   Administered Date(s) Administered     COVID-19 Vaccine 18+ (Moderna) 08/18/2021, 09/15/2021     FLU 6-35 months 10/30/2006, 11/02/2007, 10/21/2008, 10/12/2009     Flu, Unspecified 10/22/2013, 10/03/2014     [id number] Novel Flu- Nasal 10/21/2009     HepB 07/22/1999     HepB-Adult 07/22/1999, 08/25/1999, 01/28/2000     Historical DTP/aP 1980, 1980, 1980, 11/02/1982     Influenza (IIV3) PF 11/05/1996, 10/19/2010, 10/31/2019     Influenza Vaccine >6 months (Alfuria,Fluzone) 10/18/2018, 10/31/2019, 10/29/2020, 10/14/2021     MMR 09/07/1992, 08/05/1993     Mantoux Tuberculin Skin Test 02/03/2012, 04/04/2012, 09/30/2014, 10/07/2014     OPV, trivalent, live 1980, 1980, 1981, 11/02/1982     Pneumo Conj 13-V (2010&after) 09/20/2016     Pneumococcal 23 valent 12/20/2016     TD (ADULT, 7+) 07/14/1995, 01/17/2006     Tdap (Adacel,Boostrix) 12/10/2013            Chart documentation done in part with Dragon Voice recognition Software. Although reviewed after completion, some word and grammatical error may remain.    Video-Visit Details    Type of service:  Video Visit    Video Start Time: 12:45 PM    Video End Time: 1:00 PM    Originating Location (pt. Location):  [place]    Distant Location (provider location):  Off site, [place]    Platform used for Video Visit: Fabiano Benítez MD

## 2023-01-23 NOTE — PATIENT INSTRUCTIONS
RHEUMATOLOGY    Dr. Andrea Benítez    [facility]  [address] [place] [address]  [place] [address]  Phone number: [phone]  Fax number: [phone]    You may schedule your FLU shot by calling [phone] or if you would like to get your shot at a [facility] pharmacy you may schedule online at [email or web address].    Thank you for choosing [facility]!    Nicolette Sarmiento CMA Rheumatology

## 2023-02-03 ENCOUNTER — TELEPHONE (OUTPATIENT)
Dept: RHEUMATOLOGY | Facility: CLINIC | Age: 43
End: 2023-02-03
Payer: COMMERCIAL

## 2023-02-03 NOTE — TELEPHONE ENCOUNTER
[facility] Call Center    Phone Message    May a detailed message be left on voicemail: yes     Reason for Call: Medication Question or concern regarding medication   Prescription Clarification  Name of Medication: prednisone   Prescribing Provider: Jeyson Flores    Pharmacy: [facility]    What on the order needs clarification?     Does pt needs refill on meds or is it okay for the pharmacy to just close the account.         Action Taken: Other: [facility] Rheum    Travel Screening: Not Applicable

## 2023-02-06 NOTE — TELEPHONE ENCOUNTER
Returned call and spoke to Star a certified tech who removed med from chart at this time as a refill is not indicated.    Zayra CROCKER RN Specialty Triage 2/6/2023 3:51 PM

## 2023-04-09 ENCOUNTER — HEALTH MAINTENANCE LETTER (OUTPATIENT)
Age: 43
End: 2023-04-09

## 2023-04-10 ENCOUNTER — MYC MEDICAL ADVICE (OUTPATIENT)
Dept: FAMILY MEDICINE | Facility: CLINIC | Age: 43
End: 2023-04-10
Payer: COMMERCIAL

## 2023-04-10 DIAGNOSIS — E03.9 HYPOTHYROIDISM, UNSPECIFIED TYPE: Primary | Chronic | ICD-10-CM

## 2023-04-10 NOTE — TELEPHONE ENCOUNTER
Order/Referral Request    Who is requesting: pt    Orders being requested: lab order for [facility]    Reason service is needed/diagnosis: DUE LAST NOVEMBER    When are orders needed by: 4/18 pt has appt with Tony on 4/21    Has this been discussed with Provider: Yes    Does patient have a preference on a Group/Provider/Facility? YES    Does patient have an appointment scheduled?: Yes: WILL SCHEDULE LAB APPT WHEN ORDER IS PLACED.    [facility] to send [facility]: [facility] PLEASE CALL WHEN ORDER IS PLACED SO PT CAN MAKE LAB APPT.    Could we send this information to you in Qwiqq or would you prefer to receive a phone call?:   Patient would prefer a phone call   Okay to leave a detailed message?: Yes at Home number on file [phone] (home)

## 2023-04-10 NOTE — TELEPHONE ENCOUNTER
Forwarding [facility] request for pre-visit lab order request.   Order pended for consideration.    Maritza Luevano RN  [facility]

## 2023-04-15 ENCOUNTER — ANCILLARY PROCEDURE (OUTPATIENT)
Dept: MAMMOGRAPHY | Facility: CLINIC | Age: 43
End: 2023-04-15
Attending: FAMILY MEDICINE
Payer: COMMERCIAL

## 2023-04-15 DIAGNOSIS — Z12.31 VISIT FOR SCREENING MAMMOGRAM: ICD-10-CM

## 2023-04-15 PROCEDURE — 77067 SCR MAMMO BI INCL CAD: CPT | Mod: TC | Performed by: RADIOLOGY

## 2023-04-15 PROCEDURE — 77063 BREAST TOMOSYNTHESIS BI: CPT | Mod: TC | Performed by: RADIOLOGY

## 2023-04-17 ENCOUNTER — LAB (OUTPATIENT)
Dept: LAB | Facility: CLINIC | Age: 43
End: 2023-04-17
Payer: COMMERCIAL

## 2023-04-17 DIAGNOSIS — E03.9 HYPOTHYROIDISM, UNSPECIFIED TYPE: Chronic | ICD-10-CM

## 2023-04-17 DIAGNOSIS — Z79.899 HIGH RISK MEDICATION USE: ICD-10-CM

## 2023-04-17 DIAGNOSIS — M06.09 RHEUMATOID ARTHRITIS OF MULTIPLE SITES WITH NEGATIVE RHEUMATOID FACTOR (H): ICD-10-CM

## 2023-04-17 LAB
ALBUMIN SERPL BCG-MCNC: 4.1 G/DL (ref 3.5–5.2)
ALP SERPL-CCNC: 48 U/L (ref 35–104)
ALT SERPL W P-5'-P-CCNC: 21 U/L (ref 10–35)
AST SERPL W P-5'-P-CCNC: 23 U/L (ref 10–35)
BASOPHILS # BLD AUTO: 0.1 10E3/UL (ref 0–0.2)
BASOPHILS NFR BLD AUTO: 1 %
BILIRUB DIRECT SERPL-MCNC: <0.2 MG/DL (ref 0–0.3)
BILIRUB SERPL-MCNC: 0.3 MG/DL
CREAT SERPL-MCNC: 1.14 MG/DL (ref 0.51–0.95)
EOSINOPHIL # BLD AUTO: 0.2 10E3/UL (ref 0–0.7)
EOSINOPHIL NFR BLD AUTO: 1 %
ERYTHROCYTE [DISTWIDTH] IN BLOOD BY AUTOMATED COUNT: 14.4 % (ref 10–15)
GFR SERPL CREATININE-BSD FRML MDRD: 61 ML/MIN/1.73M2
HCT VFR BLD AUTO: 44.3 % (ref 35–47)
HGB BLD-MCNC: 14.6 G/DL (ref 11.7–15.7)
LYMPHOCYTES # BLD AUTO: 6.5 10E3/UL (ref 0.8–5.3)
LYMPHOCYTES NFR BLD AUTO: 44 %
MCH RBC QN AUTO: 30.4 PG (ref 26.5–33)
MCHC RBC AUTO-ENTMCNC: 33 G/DL (ref 31.5–36.5)
MCV RBC AUTO: 92 FL (ref 78–100)
MONOCYTES # BLD AUTO: 1.5 10E3/UL (ref 0–1.3)
MONOCYTES NFR BLD AUTO: 10 %
NEUTROPHILS # BLD AUTO: 6.7 10E3/UL (ref 1.6–8.3)
NEUTROPHILS NFR BLD AUTO: 45 %
PLATELET # BLD AUTO: 220 10E3/UL (ref 150–450)
PROT SERPL-MCNC: 7.3 G/DL (ref 6.4–8.3)
RBC # BLD AUTO: 4.81 10E6/UL (ref 3.8–5.2)
TSH SERPL DL<=0.005 MIU/L-ACNC: 1.1 UIU/ML (ref 0.3–4.2)
WBC # BLD AUTO: 14.9 10E3/UL (ref 4–11)

## 2023-04-17 PROCEDURE — 84443 ASSAY THYROID STIM HORMONE: CPT

## 2023-04-17 PROCEDURE — 36415 COLL VENOUS BLD VENIPUNCTURE: CPT

## 2023-04-17 PROCEDURE — 85025 COMPLETE CBC W/AUTO DIFF WBC: CPT

## 2023-04-17 PROCEDURE — 80076 HEPATIC FUNCTION PANEL: CPT

## 2023-04-17 PROCEDURE — 82565 ASSAY OF CREATININE: CPT

## 2023-04-18 ASSESSMENT — ANXIETY QUESTIONNAIRES
3. WORRYING TOO MUCH ABOUT DIFFERENT THINGS: NOT AT ALL
2. NOT BEING ABLE TO STOP OR CONTROL WORRYING: NOT AT ALL
4. TROUBLE RELAXING: NOT AT ALL
GAD7 TOTAL SCORE: 0
IF YOU CHECKED OFF ANY PROBLEMS ON THIS QUESTIONNAIRE, HOW DIFFICULT HAVE THESE PROBLEMS MADE IT FOR YOU TO DO YOUR WORK, TAKE CARE OF THINGS AT HOME, OR GET ALONG WITH OTHER PEOPLE: NOT DIFFICULT AT ALL
GAD7 TOTAL SCORE: 0
7. FEELING AFRAID AS IF SOMETHING AWFUL MIGHT HAPPEN: NOT AT ALL
1. FEELING NERVOUS, ANXIOUS, OR ON EDGE: NOT AT ALL
8. IF YOU CHECKED OFF ANY PROBLEMS, HOW DIFFICULT HAVE THESE MADE IT FOR YOU TO DO YOUR WORK, TAKE CARE OF THINGS AT HOME, OR GET ALONG WITH OTHER PEOPLE?: NOT DIFFICULT AT ALL
7. FEELING AFRAID AS IF SOMETHING AWFUL MIGHT HAPPEN: NOT AT ALL
6. BECOMING EASILY ANNOYED OR IRRITABLE: NOT AT ALL
GAD7 TOTAL SCORE: 0
5. BEING SO RESTLESS THAT IT IS HARD TO SIT STILL: NOT AT ALL

## 2023-04-18 ASSESSMENT — PATIENT HEALTH QUESTIONNAIRE - PHQ9
10. IF YOU CHECKED OFF ANY PROBLEMS, HOW DIFFICULT HAVE THESE PROBLEMS MADE IT FOR YOU TO DO YOUR WORK, TAKE CARE OF THINGS AT HOME, OR GET ALONG WITH OTHER PEOPLE: NOT DIFFICULT AT ALL
SUM OF ALL RESPONSES TO PHQ QUESTIONS 1-9: 1
SUM OF ALL RESPONSES TO PHQ QUESTIONS 1-9: 1

## 2023-04-20 NOTE — PROGRESS NOTES
Vida Whitfield  is a 43 year old year old who is being evaluated via a billable video visit.      How would you like to obtain your AVS? MyChart  If the video visit is dropped, the invitation should be resent by: Text to cell phone: [phone]   Will anyone else be joining your video visit? No       Vida Whitfield  is a 42 year old year old who is being evaluated via a billable video visit.      How would you like to obtain your AVS? MyChart  If the video visit is dropped, the invitation should be resent by: Text to cell phone: [phone]   Will anyone else be joining your video visit? No     Rheumatology Video Visit      Vida Whitfield MRN# [id number]   YOB: 1980 Age: 43 year old      Date of visit: 4/21/23   PCP: Dr. Fco Medeiros  Ophthalmology: [facility] in [place], [place]    Chief Complaint   Patient presents with:  Rheumatoid Arthritis    Assessment and Plan     1. Seronegative nonerosive rheumatoid arthritis: Currently on methotrexate 15 mg once weekly (Cr elevation possibly secondary to MTX so MTX was reduced with improvement of Cr), folic acid 1 mg daily, and hydroxychloroquine 400 mg daily.  Previously on sulfasalazine (1500mg BID effective, stopped when difficult to obtain due to sulfasalazine shortage; may use again in the future if needed).  Then was doing well on a combination methotrexate and hydroxychloroquine until early January 2023 when she presented with more left ankle pain and pain at the bilateral PIPs and MCPs that were inflammatory in nature and resolved with a course of prednisone (10 mg daily x7 days, then 5 mg daily x7 days), then did well until just recently when she had right Achilles enthesitis that was evaluated by a podiatrist and is now on prednisone with improvement and going to use a walking boot.  Concern for arthritis related enthesitis.  Raises question of spondyloarthritis versus rheumatoid arthritis and this was discussed today (negative HLA-B27 at  [facility] on 2/6/2014).  Discussed restarting sulfasalazine versus maintaining current regimen and monitoring.  After discussion, will remain on current regimen for now but if recurrent inflammatory arthritis symptoms or nonresolving enthesitis then plan to add sulfasalazine at follow-up.  Chronic illness.      - Continue methotrexate 15mg once weekly  - Continue folic acid 1 mg daily  - Continue hydroxychloroquine 400mg daily (last eye exam performed at [facility] on 7/18/2022; yearly eye exam required)  - Avoid oral NSAIDs because they are historically triggers for urticaria; tolerates voltaren gel  - Labs in 3 months: CBC, Creatinine, Hepatic Panel, ESR, CRP, hepatitis B core antibody and surface antigen, hepatitis C antibody    High risk medication requiring intensive toxicity monitoring at least quarterly: labs ordered include CBC, Creatinine, Hepatic panel to monitor for cytopenia and hepatotoxicity; checking creatinine as it affects clearance of medication.      # On birth control per patient    2. Bilateral patellofemoral syndrome: Previous steroid injections have been effective; repeat injection not needed at this time.  Doing exercises and trying to lose weight; doing well today.      3. Urticaria: Historically, oral NSAIDs are triggers.  Documented here for clinical significance only.  Not managed in this clinic.    4. Bone health: 9/20/2016 Vitamin D level normal.     5.  Right Achilles enthesitis: Following with podiatry.  Started in early April 2023 and is improving with prednisone and going to start using a walking boot.  See #1.    6.  Vaccinations: Vaccinations reviewed with MsBurke Nahid.     - Influenza: up to date per patient  - [id number]: up to date  - [id number]: up to date  - COVID-19: Up to date    7.  Elevated creatinine: Avoid oral NSAIDs.  Discussed the importance of hydration before labs.  If continues to rise then she will need to discuss with her primary care provider.    Total  minutes spent in evaluation with patient, documentation, , and review of pertinent studies and chart notes: 14     Ms. Whitfield verbalized agreement with and understanding of the rational for the diagnosis and treatment plan.  All questions were answered to best of my ability and the patient's satisfaction. Ms. Whitfield was advised to contact the clinic with any questions that may arise after the clinic visit.      Thank you for involving me in the care of the patient    Return to clinic: 3 months    HPI   Vida Whitfield is a 43 year old female with a medical history significant for anxiety, depression, chronic idiopathic urticaria, hyperlipidemia, hypothyroidism, rosacea, obstructive sleep apnea, and inflammatory arthritis who presents for follow-up of rheumatoid arthritis.    Historical records in this paragraph: She was previously evaluated by Dr. Blaine Anthony at [facility].  2014 labs show negative: hepatitis C ab, HBV surface ag, HBV core ab, HLA-B27, Sm, RNP, SSA, SSB, Scl-70, DNA, cardiolipin, CCP, PR3, MPO. NIKKIE 1:160 homogeneous.  MPO also positive (one negative and one positive value). Per a 3/11/2016 clinic note, she has seronegative rheumatoid arthritis and chronic urticaria. She developed joint swelling and stiffness in the bilateral MCPs, MTPs, ankles, and knees that started in November 2013 shortly after tapering off prednisone that was used to manage chronic urticaria. No history of psoriasis or inflammatory bowel disease. Negative SI joint x-rays. Negative MRI of the SI joint. Flaring of her urticaria with NSAIDs. Steroid responsive in regard to her joints. Near resolution of joint stiffness and pain with methotrexate. NSAIDs are avoided because they cause flares of her urticaria.    1/23/2023: In the last 1 week she has had more pain at the PIPs > MCPs > left ankle.  No swelling of these joints.  Morning stiffness for about 1 hour.  Pain and stiffness is worse in the morning and improves  with time and activity.  She attributes the increased joint pain to the weather.    Today, 4/21/2023: The prednisone course given in January 2023 was effective and resolved the inflammatory arthritis symptoms at the ankles, PIPs, and MCPs.  She then did well for a while but for the past 2 weeks has had right Achilles enthesitis pain that was evaluated by podiatry and she was placed on prednisone with improvement of the Achilles enthesitis, but no other improvement of her joints because they were already doing well.  She is also going to use a walking boot from podiatry.    No fevers or chills.  No nausea or vomiting.  No blood in her stool.  No oral or nasal sores.  No chest pain/pressure, palpitations, or shortness of breath.  No cough.  No eye pain or redness.    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    ROS   12 point review of system was completed and negative except as noted in the HPI     Active Problem List     Patient Active Problem List   Diagnosis     Contraceptive management     Hyperhidrosis of axilla     CARDIOVASCULAR SCREENING; LDL GOAL LESS THAN 160     Generalized anxiety disorder     Moderate recurrent major depression (H)     Chronic idiopathic urticaria     Hyperlipidemia with target LDL less than 130     Hypothyroidism, unspecified hypothyroidism type     Rosacea     Non morbid obesity due to excess calories     BRIDGETTE (obstructive sleep apnea)     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 factor (H)     High risk medication use     Obesity (BMI 35.0-39.9) with comorbidity (H)     Past Medical History     Past Medical History:   Diagnosis Date     Arthritis 01/01/2013     Depressive disorder 01/01/2003     Peripheral autonomic neuropathy in disorders classified elsewhere(337.1)      Pure hypercholesterolemia      Thyroid disease 01/01/1995      Past Surgical History     Past Surgical History:   Procedure Laterality Date     CHOLECYSTECTOMY, LAPOROSCOPIC  2006    Cholecystectomy, Laparoscopic     SURGICAL HISTORY OF -   17 years old    Thyroid ablation     Z APPENDECTOMY  2002     Allergy     Allergies   Allergen Reactions     Macrobid [Nitrofuran Derivatives]      Paxil [Paroxetine]      Anxiety         Zoloft      Mood changes     Current Medication List     Current Outpatient Medications   Medication Sig     ALPRAZolam (XANAX) 0.5 MG tablet Take 1 tablet (0.5 mg) by mouth 3 times daily as needed for anxiety     buPROPion (WELLBUTRIN XL) 150 MG 24 hr tablet Take 3 tablets by mouth once daily Provider visit due for further refills     busPIRone (BUSPAR) 15 MG tablet Take 1 tablet (15mg) by mouth twice daily. Provider visit due for further refills     cetirizine (ZYRTEC) 10 MG tablet Take 20 mg by mouth daily.     diclofenac (VOLTAREN) 1 % GEL topical gel Apply 2 grams to hands four times daily using enclosed dosing card.     folic acid (FOLVITE) 1 MG tablet Take 1 tablet (1 mg) by mouth daily     hydroxychloroquine (PLAQUENIL) 200 MG tablet Take 2 tablets (400 mg) by mouth daily     levonorgestrel-ethinyl estradiol (SEASONALE) 0.15-0.03 MG tablet Take 1 tablet by mouth daily Provider visit due for further refills     levothyroxine (SYNTHROID/LEVOTHROID) 175 MCG tablet Take 1 tablet (175 mcg) by mouth daily Provider visit due for further refills     methotrexate sodium 2.5 MG TABS Take 6 tablets (15 mg) by mouth once a week . Take all 6 tablets on the same day of each week.     Multiple Vitamin (MULTIVITAMIN) per tablet Take 1 tablet by mouth daily.     traZODone (DESYREL) 50 MG tablet Take 1 to 2 tablets by mouth nightly at bedtime. Provider visit due for further refills     order for DME Equipment being ordered: CPAP 6-10 cm     No current facility-administered medications for this visit.         Social History   See HPI    Family History     Family  "History   Problem Relation Age of Onset     Diabetes Mother      Hypertension Mother      Lipids Mother      Hyperlipidemia Mother      Hypertension Father      Lipids Father      Thyroid Disease Father      Cancer Father      Hyperlipidemia Father      Other Cancer Father         Gastric & Bone Cancer     Diabetes Maternal Grandmother      Diabetes Paternal Grandmother      Melanoma No family hx of        Physical Exam     Temp Readings from Last 3 Encounters:   11/15/21 98.1  F (36.7  C) (Tympanic)   11/16/20 98.3  F (36.8  C) (Tympanic)   11/07/19 98.1  F (36.7  C) (Tympanic)     BP Readings from Last 5 Encounters:   11/15/21 128/84   11/16/20 (!) 120/92   12/20/19 129/82   11/07/19 110/82   06/14/19 131/88     Pulse Readings from Last 1 Encounters:   11/15/21 90     Resp Readings from Last 1 Encounters:   11/15/21 16     Estimated body mass index is 38.04 kg/m  as calculated from the following:    Height as of 11/15/21: 1.775 m (5' 9.88\").    Weight as of 11/15/21: 119.8 kg (264 lb 3.2 oz).    GEN: NAD. Healthy appearing adult.   HEENT:  Anicteric, noninjected sclera. No obvious external lesions of the ear and nose. Hearing intact.  PULM: No increased work of breathing  MSK:  Hands and wrists without swelling.  Able to make a complete fist with each hand.  SKIN: No rash or jaundice seen  PSYCH: Alert. Appropriate.      Labs / Imaging (select studies)     RF/CCP  No results for input(s): CCPABY, CCPIGG, CYCLICCITPEP, RHF in the last 12168 hours.  CBC  Recent Labs   Lab Test 04/17/23  1543 01/19/23  1615 07/22/22  1528 10/24/21  1057 06/24/21  0721 12/16/20  1826 11/16/20  1514 10/15/20  1434   WBC 14.9* 7.6 7.4   < > 6.8 6.6   < > 7.0   RBC 4.81 4.25 4.33   < > 4.17 4.05   < > 3.84   HGB 14.6 12.8 13.3   < > 13.1 13.0   < > 12.2   HCT 44.3 37.5 38.9   < > 38.1 38.3   < > 35.8   MCV 92 88 90   < > 91 95   < > 93   RDW 14.4 13.4 13.5   < > 13.1 12.9   < > 12.3    174 169   < > 182 175   < > 170   MCH 30.4 " 30.1 30.7   < > 31.4 32.1   < > 31.8   MCHC 33.0 34.1 34.2   < > 34.4 33.9   < > 34.1   NEUTROPHIL 45 42 45   < > 49.0 45.7  --  44.7   LYMPH 44 43 39   < > 40.5 43.1  --  44.8   MONOCYTE 10 9 11   < > 8.3 10.4  --  10.0   EOSINOPHIL 1 6 5   < > 1.8 0.3  --  0.1   BASOPHIL 1 1 1   < > 0.4 0.5  --  0.4   ANEU  --   --   --   --  3.3 3.0  --  3.1   ALYM  --   --   --   --  2.7 2.8  --  3.1   FREDA  --   --   --   --  0.6 0.7  --  0.7   AEOS  --   --   --   --  0.1 0.0  --  0.0   ABAS  --   --   --   --  0.0 0.0  --  0.0   ANEUTAUTO 6.7 3.2 3.3   < >  --   --   --   --    ALYMPAUTO 6.5* 3.3 2.9   < >  --   --   --   --    AMONOAUTO 1.5* 0.7 0.8   < >  --   --   --   --    AEOSAUTO 0.2 0.5 0.4   < >  --   --   --   --    ABSBASO 0.1 0.0 0.1   < >  --   --   --   --     < > = values in this interval not displayed.     CMP  Recent Labs   Lab Test 04/17/23  1543 01/19/23  1615 07/22/22  1528 10/24/21  1057 06/24/21  0721 12/16/20  1826 10/15/20  1434 12/17/19  1640 10/19/19  1041 12/10/18  1900 09/23/18  1058 01/26/17  0745 12/20/16  1138   NA  --   --   --   --   --   --   --   --   --   --   --   --  138   POTASSIUM  --   --   --   --   --   --   --   --   --   --   --   --  4.4   CHLORIDE  --   --   --   --   --   --   --   --   --   --   --   --  104   CO2  --   --   --   --   --   --   --   --   --   --   --   --  30   ANIONGAP  --   --   --   --   --   --   --   --   --   --   --   --  4   GLC  --   --   --   --   --   --   --   --  81  --  79  --  84   BUN  --   --   --   --   --   --   --   --   --   --   --   --  13   CR 1.14* 1.02* 0.87   < > 1.03 0.99 0.97   < >  --    < >  --    < > 1.05*   GFRESTIMATED 61 71 85   < > 68 71 73   < >  --    < >  --    < > 59*   GFRESTBLACK  --   --   --   --  79 83 85   < >  --    < >  --    < > 72   TATI  --   --   --   --   --   --   --   --   --   --   --   --  8.7   BILITOTAL 0.3 0.4 0.3   < > 0.6 0.3 0.4   < >  --    < >  --    < >  --    ALBUMIN 4.1 3.8 3.5   < > 3.3* 3.8  3.5   < >  --    < >  --    < >  --    PROTTOTAL 7.3 6.8 7.1   < > 6.8 7.3 7.2   < >  --    < >  --    < >  --    ALKPHOS 48 42 42   < > 47 57 45   < >  --    < >  --    < >  --    AST 23 24 22   < > 17 16 19   < >  --    < >  --    < >  --    ALT 21 21 27   < > 21 23 22   < >  --    < >  --    < >  --     < > = values in this interval not displayed.     Calcium/VitaminD  Recent Labs   Lab Test 12/20/16  1138 09/20/16  1554   TATI 8.7  --    VITDT  --  36     ESR/CRP  Recent Labs   Lab Test 01/19/23  1615 07/22/22  1528 01/25/22  1508 10/24/21  1057   SED 8 8 7 7   CRP  --  7.8 8.6* 6.7   CRPI 11.24*  --   --   --      Lipid Panel  Recent Labs   Lab Test 10/19/19  1041 09/23/18  1058 09/02/16  0736   CHOL 187 166 157   TRIG 80 91 70   HDL 58 54 57   * 94 86   NHDL 129 112 100     --------------------------------------------------    From care everywhere:      Immunization History     Immunization History   Administered Date(s) Administered     COVID-19 Vaccine 18+ (Moderna) 08/18/2021, 09/15/2021     FLU 6-35 months 10/30/2006, 11/02/2007, 10/21/2008, 10/12/2009     Flu, Unspecified 10/22/2013, 10/03/2014     [id number] Novel Flu- Nasal 10/21/2009     HepB 07/22/1999     Hepatitis B, Adult 07/22/1999, 08/25/1999, 01/28/2000     Historical DTP/aP 1981, 1980, 1981, 11/02/1982     Influenza (IIV3) PF 11/05/1996, 10/19/2010, 10/31/2019     Influenza Vaccine >6 months (Alfuria,Fluzone) 10/18/2018, 10/31/2019, 10/29/2020, 10/14/2021, 10/19/2022     MMR 09/07/1992, 08/05/1993     Mantoux Tuberculin Skin Test 02/03/2012, 04/04/2012, 09/30/2014, 10/07/2014     OPV, trivalent, live 1981, 1981, 1981, 11/02/1982     Pneumo Conj 13-V (2010&after) 09/20/2016     Pneumococcal 23 valent 12/20/2016     TD,PF 7+ (Tenivac) 07/14/1995, 01/17/2006     TDAP (Adacel,Boostrix) 12/10/2013            Chart documentation done in part with Dragon Voice recognition Software. Although reviewed after  completion, some word and grammatical error may remain.      Video-Visit Details    Type of service:  Video Visit    Video Start Time: 9:50 AM    Video End Time:10:02 AM    Originating Location (pt. Location): Home, [place]    Distant Location (provider location):  Off-site, [place]    Platform used for Video Visit: Fabiano Benítez MD

## 2023-04-20 NOTE — PATIENT INSTRUCTIONS
RHEUMATOLOGY    Dr. Andrea Benítez    [facility]  [address] [place] [id number]  Phone number: [phone]  Fax number: [phone]      Thank you for choosing [facility]!

## 2023-04-21 ENCOUNTER — OFFICE VISIT (OUTPATIENT)
Dept: FAMILY MEDICINE | Facility: CLINIC | Age: 43
End: 2023-04-21
Payer: COMMERCIAL

## 2023-04-21 ENCOUNTER — VIRTUAL VISIT (OUTPATIENT)
Dept: RHEUMATOLOGY | Facility: CLINIC | Age: 43
End: 2023-04-21
Payer: COMMERCIAL

## 2023-04-21 VITALS
RESPIRATION RATE: 16 BRPM | HEIGHT: 70 IN | BODY MASS INDEX: 40.66 KG/M2 | WEIGHT: 284 LBS | DIASTOLIC BLOOD PRESSURE: 88 MMHG | HEART RATE: 87 BPM | OXYGEN SATURATION: 98 % | TEMPERATURE: 98.6 F | SYSTOLIC BLOOD PRESSURE: 120 MMHG

## 2023-04-21 DIAGNOSIS — F33.1 MODERATE RECURRENT MAJOR DEPRESSION (H): ICD-10-CM

## 2023-04-21 DIAGNOSIS — Z79.899 HIGH RISK MEDICATION USE: ICD-10-CM

## 2023-04-21 DIAGNOSIS — E66.01 MORBID OBESITY (H): ICD-10-CM

## 2023-04-21 DIAGNOSIS — Z30.41 ENCOUNTER FOR SURVEILLANCE OF CONTRACEPTIVE PILLS: Primary | ICD-10-CM

## 2023-04-21 DIAGNOSIS — M77.9 ENTHESITIS: ICD-10-CM

## 2023-04-21 DIAGNOSIS — D69.9 HEMORRHAGIC CONDITION, UNSPECIFIED (H): ICD-10-CM

## 2023-04-21 DIAGNOSIS — E03.9 HYPOTHYROIDISM, UNSPECIFIED TYPE: ICD-10-CM

## 2023-04-21 DIAGNOSIS — M06.09 RHEUMATOID ARTHRITIS OF MULTIPLE SITES WITH NEGATIVE RHEUMATOID FACTOR (H): Primary | ICD-10-CM

## 2023-04-21 PROCEDURE — 99214 OFFICE O/P EST MOD 30 MIN: CPT | Mod: VID | Performed by: INTERNAL MEDICINE

## 2023-04-21 PROCEDURE — 99214 OFFICE O/P EST MOD 30 MIN: CPT | Performed by: FAMILY MEDICINE

## 2023-04-21 RX ORDER — LEVONORGESTREL AND ETHINYL ESTRADIOL 0.15-0.03
1 KIT ORAL DAILY
Qty: 91 TABLET | Refills: 3 | Status: SHIPPED | OUTPATIENT
Start: 2023-04-21 | End: 2024-05-06

## 2023-04-21 RX ORDER — LEVOTHYROXINE SODIUM 175 UG/1
175 TABLET ORAL DAILY
Qty: 90 TABLET | Refills: 3 | Status: SHIPPED | OUTPATIENT
Start: 2023-04-21 | End: 2024-02-01

## 2023-04-21 RX ORDER — BUSPIRONE HYDROCHLORIDE 15 MG/1
TABLET ORAL
Qty: 180 TABLET | Refills: 3 | Status: SHIPPED | OUTPATIENT
Start: 2023-04-21 | End: 2024-02-01

## 2023-04-21 RX ORDER — BUPROPION HYDROCHLORIDE 150 MG/1
TABLET ORAL
Qty: 270 TABLET | Refills: 3 | Status: SHIPPED | OUTPATIENT
Start: 2023-04-21 | End: 2024-04-01

## 2023-04-21 RX ORDER — TRAZODONE HYDROCHLORIDE 50 MG/1
TABLET, FILM COATED ORAL
Qty: 180 TABLET | Refills: 3 | Status: SHIPPED | OUTPATIENT
Start: 2023-04-21 | End: 2024-05-06

## 2023-04-21 ASSESSMENT — ENCOUNTER SYMPTOMS
ALLERGIC/IMMUNOLOGIC NEGATIVE: 1
PSYCHIATRIC NEGATIVE: 1
CARDIOVASCULAR NEGATIVE: 1
MUSCULOSKELETAL NEGATIVE: 1
RESPIRATORY NEGATIVE: 1
GASTROINTESTINAL NEGATIVE: 1
EYES NEGATIVE: 1
CONSTITUTIONAL NEGATIVE: 1
ENDOCRINE NEGATIVE: 1
HEMATOLOGIC/LYMPHATIC NEGATIVE: 1

## 2023-04-21 ASSESSMENT — PATIENT HEALTH QUESTIONNAIRE - PHQ9
10. IF YOU CHECKED OFF ANY PROBLEMS, HOW DIFFICULT HAVE THESE PROBLEMS MADE IT FOR YOU TO DO YOUR WORK, TAKE CARE OF THINGS AT HOME, OR GET ALONG WITH OTHER PEOPLE: NOT DIFFICULT AT ALL
SUM OF ALL RESPONSES TO PHQ QUESTIONS 1-9: 1

## 2023-04-21 ASSESSMENT — ANXIETY QUESTIONNAIRES: GAD7 TOTAL SCORE: 0

## 2023-04-21 ASSESSMENT — PAIN SCALES - GENERAL: PAINLEVEL: NO PAIN (0)

## 2023-04-21 NOTE — PROGRESS NOTES
"  Assessment & Plan   Patient here for medication refills  Reports no side effects and medication have been effective.  Mentioned a chronic cough on and off. She does suffer from allergies. Recommend trying Flonase. also consider acid reflux as a cause of the dry cough. She says it seems worse at night  Encounter for surveillance of contraceptive pills  Medication faxed.  - levonorgestrel-ethinyl estradiol (SEASONALE) 0.15-0.03 MG tablet; Take 1 tablet by mouth daily    Hemorrhagic condition, unspecified (H)  Resolved.     Moderate recurrent major depression (H)  Medication faxed no side effects  - buPROPion (WELLBUTRIN XL) 150 MG 24 hr tablet; Take 3 tablets by mouth once daily  - busPIRone (BUSPAR) 15 MG tablet; Take 1 tablet (15mg) by mouth twice daily.  - traZODone (DESYREL) 50 MG tablet; Take 1 to 2 tablets by mouth nightly at bedtime. Provider visit due for further refills    Morbid obesity (H)  Discussed options for weight loss.     Hypothyroidism, unspecified type  Labs discussed - medication refilled.  - levothyroxine (SYNTHROID/LEVOTHROID) 175 MCG tablet; Take 1 tablet (175 mcg) by mouth daily      BMI:   Estimated body mass index is 40.75 kg/m  as calculated from the following:    Height as of this encounter: 1.778 m (5' 10\").    Weight as of this encounter: 128.8 kg (284 lb).   FUTURE APPOINTMENTS:       - Follow-up visit in 3- 6 months     Martina Jimenez MD  [facility]    Sindy Mcpherson is a 43 year old, presenting for the following health issues:    43 yr old female here for medication refills,   She has hypothyrodism  Thyroid Problem (Recheck on thyroid medication.), Depression (Recheck on medication.), Contraception (Refill of birth control.), Imm/Inj (Declined Covid injection.  Discuss if she is due for a pneumonia injection.), and Cough (Her  states he has noticed that she coughs more often than she should for months.  Seems to be more at night.  She " is not feeling sick.  Just wanting to make sure there is nothing else going on that needs to be evaluated.  )        4/21/2023    10:49 AM   Additional Questions   Roomed by Aria Conde CMA     Chief Complaint   Patient presents with     Thyroid Problem     Recheck on thyroid medication.     Depression     Recheck on medication.     Contraception     Refill of birth control.     Imm/Inj     Declined Covid injection.  Discuss if she is due for a pneumonia injection.     Cough     Her  states he has noticed that she coughs more often than she should for months.  Seems to be more at night.  She is not feeling sick.  Just wanting to make sure there is nothing else going on that needs to be evaluated.         Contraception    History of Present Illness       Mental Health Follow-up:  Patient presents to follow-up on Depression & Anxiety.Patient's depression since last visit has been:  Good  The patient is not having other symptoms associated with depression.  Patient's anxiety since last visit has been:  Good  The patient is not having other symptoms associated with anxiety.  Any significant life events: No  Patient is not feeling anxious or having panic attacks.  Patient has no concerns about alcohol or drug use.    She eats 2-3 servings of fruits and vegetables daily.She consumes 1 sweetened beverage(s) daily.She exercises with enough effort to increase her heart rate 10 to 19 minutes per day.  She exercises with enough effort to increase her heart rate 3 or less days per week.   She is taking medications regularly.    Today's PHQ-9         PHQ-9 Total Score: 1    PHQ-9 Q9 Thoughts of better off dead/self-harm past 2 weeks :   Not at all    How difficult have these problems made it for you to do your work, take care of things at home, or get along with other people: Not difficult at all  Today's JANET-7 Score: 0       Hypothyroidism Follow-up      Since last visit, patient describes the following symptoms:  "Weight stable, no hair loss, no skin changes, no constipation, no loose stools    Medication Followup of birth control    Taking Medication as prescribed: yes    Side Effects:  None    Medication Helping Symptoms:  yes          Review of Systems   Constitutional: Negative.    HENT: Negative.    Eyes: Negative.    Respiratory: Negative.    Cardiovascular: Negative.    Gastrointestinal: Negative.    Endocrine: Negative.    Breasts:  negative.    Musculoskeletal: Negative.    Allergic/Immunologic: Negative.    Hematological: Negative.    Psychiatric/Behavioral: Negative.             Objective    /88 (BP Location: Left arm, Patient Position: Chair, Cuff Size: Adult Large)   Pulse 87   Temp 98.6  F (37  C) (Tympanic)   Resp 16   Ht 1.778 m (5' 10\")   Wt 128.8 kg (284 lb)   LMP 03/03/2023   SpO2 98%   BMI 40.75 kg/m    Body mass index is 40.75 kg/m .  Physical Exam   GENERAL: healthy, alert and no distress  EYES: Eyes grossly normal to inspection, PERRL and conjunctivae and sclerae normal  HENT: ear canals and TM's normal, nose and mouth without ulcers or lesions  NECK: no adenopathy, no asymmetry, masses, or scars and thyroid normal to palpation  RESP: lungs clear to auscultation - no rales, rhonchi or wheezes  CV: regular rate and rhythm, normal S1 S2, no S3 or S4, no murmur, click or rub, no peripheral edema and peripheral pulses strong  ABDOMEN: soft, nontender, no hepatosplenomegaly, no masses and bowel sounds normal  MS: no gross musculoskeletal defects noted, no edema  SKIN: no suspicious lesions or rashes    Lab on 04/17/2023   Component Date Value Ref Range Status     Creatinine 04/17/2023 1.14 (H)  0.51 - 0.95 mg/dL Final     GFR Estimate 04/17/2023 61  >60 mL/min/1.73m2 Final    eGFR calculated using 2021 CKD-EPI equation.     Protein Total 04/17/2023 7.3  6.4 - 8.3 g/dL Final     Albumin 04/17/2023 4.1  3.5 - 5.2 g/dL Final     Bilirubin Total 04/17/2023 0.3  <=1.2 mg/dL Final     Alkaline " Phosphatase 04/17/2023 48  35 - 104 U/L Final     AST 04/17/2023 23  10 - 35 U/L Final    Specimen is hemolyzed which can falsely elevate AST. Analysis of a non-hemolyzed specimen may result in a lower value.     ALT 04/17/2023 21  10 - 35 U/L Final     Bilirubin Direct 04/17/2023 <0.20  0.00 - 0.30 mg/dL Final    Specimen hemolyzed, may falsely lower result.     TSH 04/17/2023 1.10  0.30 - 4.20 uIU/mL Final     WBC Count 04/17/2023 14.9 (H)  4.0 - 11.0 10e3/uL Final     RBC Count 04/17/2023 4.81  3.80 - 5.20 10e6/uL Final     Hemoglobin 04/17/2023 14.6  11.7 - 15.7 g/dL Final     Hematocrit 04/17/2023 44.3  35.0 - 47.0 % Final     MCV 04/17/2023 92  78 - 100 fL Final     MCH 04/17/2023 30.4  26.5 - 33.0 pg Final     MCHC 04/17/2023 33.0  31.5 - 36.5 g/dL Final     RDW 04/17/2023 14.4  10.0 - 15.0 % Final     Platelet Count 04/17/2023 220  150 - 450 10e3/uL Final     % Neutrophils 04/17/2023 45  % Final     % Lymphocytes 04/17/2023 44  % Final     % Monocytes 04/17/2023 10  % Final     % Eosinophils 04/17/2023 1  % Final     % Basophils 04/17/2023 1  % Final     Absolute Neutrophils 04/17/2023 6.7  1.6 - 8.3 10e3/uL Final     Absolute Lymphocytes 04/17/2023 6.5 (H)  0.8 - 5.3 10e3/uL Final     Absolute Monocytes 04/17/2023 1.5 (H)  0.0 - 1.3 10e3/uL Final     Absolute Eosinophils 04/17/2023 0.2  0.0 - 0.7 10e3/uL Final     Absolute Basophils 04/17/2023 0.1  0.0 - 0.2 10e3/uL Final

## 2023-07-18 ENCOUNTER — LAB (OUTPATIENT)
Dept: LAB | Facility: CLINIC | Age: 43
End: 2023-07-18
Payer: COMMERCIAL

## 2023-07-18 DIAGNOSIS — Z79.899 HIGH RISK MEDICATION USE: ICD-10-CM

## 2023-07-18 DIAGNOSIS — M06.09 RHEUMATOID ARTHRITIS OF MULTIPLE SITES WITH NEGATIVE RHEUMATOID FACTOR (H): ICD-10-CM

## 2023-07-18 LAB
ALBUMIN SERPL BCG-MCNC: 4.1 G/DL (ref 3.5–5.2)
ALP SERPL-CCNC: 44 U/L (ref 35–104)
ALT SERPL W P-5'-P-CCNC: 24 U/L (ref 0–50)
AST SERPL W P-5'-P-CCNC: 28 U/L (ref 0–45)
BASOPHILS # BLD AUTO: 0.1 10E3/UL (ref 0–0.2)
BASOPHILS NFR BLD AUTO: 1 %
BILIRUB DIRECT SERPL-MCNC: <0.2 MG/DL (ref 0–0.3)
BILIRUB SERPL-MCNC: 0.3 MG/DL
CREAT SERPL-MCNC: 1.25 MG/DL (ref 0.51–0.95)
CRP SERPL-MCNC: 10.1 MG/L
EOSINOPHIL # BLD AUTO: 0.1 10E3/UL (ref 0–0.7)
EOSINOPHIL NFR BLD AUTO: 2 %
ERYTHROCYTE [DISTWIDTH] IN BLOOD BY AUTOMATED COUNT: 13.7 % (ref 10–15)
ERYTHROCYTE [SEDIMENTATION RATE] IN BLOOD BY WESTERGREN METHOD: 6 MM/HR (ref 0–20)
GFR SERPL CREATININE-BSD FRML MDRD: 55 ML/MIN/1.73M2
HCT VFR BLD AUTO: 40.6 % (ref 35–47)
HGB BLD-MCNC: 13.7 G/DL (ref 11.7–15.7)
IMM GRANULOCYTES # BLD: 0 10E3/UL
IMM GRANULOCYTES NFR BLD: 0 %
LYMPHOCYTES # BLD AUTO: 2.9 10E3/UL (ref 0.8–5.3)
LYMPHOCYTES NFR BLD AUTO: 41 %
MCH RBC QN AUTO: 30.4 PG (ref 26.5–33)
MCHC RBC AUTO-ENTMCNC: 33.7 G/DL (ref 31.5–36.5)
MCV RBC AUTO: 90 FL (ref 78–100)
MONOCYTES # BLD AUTO: 0.6 10E3/UL (ref 0–1.3)
MONOCYTES NFR BLD AUTO: 8 %
NEUTROPHILS # BLD AUTO: 3.5 10E3/UL (ref 1.6–8.3)
NEUTROPHILS NFR BLD AUTO: 49 %
PLATELET # BLD AUTO: 185 10E3/UL (ref 150–450)
PROT SERPL-MCNC: 6.6 G/DL (ref 6.4–8.3)
RBC # BLD AUTO: 4.51 10E6/UL (ref 3.8–5.2)
WBC # BLD AUTO: 7.2 10E3/UL (ref 4–11)

## 2023-07-18 PROCEDURE — 86803 HEPATITIS C AB TEST: CPT

## 2023-07-18 PROCEDURE — 80076 HEPATIC FUNCTION PANEL: CPT

## 2023-07-18 PROCEDURE — 86704 HEP B CORE ANTIBODY TOTAL: CPT

## 2023-07-18 PROCEDURE — 86140 C-REACTIVE PROTEIN: CPT

## 2023-07-18 PROCEDURE — 87340 HEPATITIS B SURFACE AG IA: CPT

## 2023-07-18 PROCEDURE — 85652 RBC SED RATE AUTOMATED: CPT

## 2023-07-18 PROCEDURE — 82565 ASSAY OF CREATININE: CPT

## 2023-07-18 PROCEDURE — 85025 COMPLETE CBC W/AUTO DIFF WBC: CPT

## 2023-07-18 PROCEDURE — 36415 COLL VENOUS BLD VENIPUNCTURE: CPT

## 2023-07-19 LAB
HBV CORE AB SERPL QL IA: NONREACTIVE
HBV SURFACE AG SERPL QL IA: NONREACTIVE
HCV AB SERPL QL IA: NONREACTIVE

## 2023-07-20 NOTE — PROGRESS NOTES
Vida Whitfield  is a 43 year old year old who is being evaluated via a billable video visit.      How would you like to obtain your AVS? MyChart  If the video visit is dropped, the invitation should be resent by: Text to cell phone: [phone]   Will anyone else be joining your video visit? No     Rheumatology Video Visit      Vida Whitfield MRN# [id number]   YOB: 1980 Age: 43 year old      Date of visit: 7/21/23   PCP: Dr. Fco Medeiros  Ophthalmology: [facility] in [place], [place]    Chief Complaint   Patient presents with:  Rheumatoid Arthritis    Assessment and Plan     1. Seronegative nonerosive rheumatoid arthritis: Currently on methotrexate 15 mg once weekly (Cr elevation possibly secondary to MTX so MTX was reduced with improvement of Cr), folic acid 1 mg daily, and hydroxychloroquine 400 mg daily.  Previously on sulfasalazine (1500mg BID effective, stopped when difficult to obtain due to sulfasalazine shortage; may use again in the future if needed).  Then was doing well on a combination methotrexate and hydroxychloroquine until early January 2023 when she presented with more left ankle pain and pain at the bilateral PIPs and MCPs that were inflammatory in nature and resolved with a course of prednisone (10 mg daily x7 days, then 5 mg daily x7 days), then did well until just recently when she had right Achilles enthesitis that was evaluated by a podiatrist and was given prednisone by podiatry with some improvement.  Has used a walking boot with some improvement of the right Achilles enthesopathy.  Now with more inflammatory knee and ankle symptoms and continued right Achilles enthesopathy.  Therefore, advise escalating treatment for arthritis with sulfasalazine.  Start sulfasalazine now.  Follow-up in clinic for next visit.  Chronic illness, progressive.      - Continue methotrexate 15mg once weekly  - Continue folic acid 1 mg daily  - Continue hydroxychloroquine 400mg daily (last eye exam  performed at [facility] on 7/18/2022; yearly eye exam required)  - Start sulfasalazine 500 mg twice daily x7 days, then 1000 mg twice daily thereafter  - Avoid oral NSAIDs because they are historically triggers for urticaria and because of creatinine elevation; tolerates voltaren gel  - Labs monthly [id number]: CBC, Cr, Hepatic Panel  - Labs in 3 months: CBC, Creatinine, Hepatic Panel, ESR, CRP    # Sulfasalazine Risks and Benefits: The risks and benefits of sulfasalazine were discussed in detail and the patient verbalized understanding.  The risks discussed include, but are not limited to, the risk for hypersensitivity, anaphylaxis, anaphylactoid reactions, infections, bone marrow suppression,  hepatotoxicity, nausea, vomiting, and GI upset.  Oligospermia may occur in males.  I encouraged reviewing the package insert and asking any questions about the medication.      High risk medication requiring intensive toxicity monitoring at least quarterly.     # On birth control per patient    2. Bilateral patellofemoral syndrome: Previous steroid injections have been effective; repeat injection not needed at this time.  Knee symptoms are currently inflammatory in nature.      3. Urticaria: Historically, oral NSAIDs are triggers.  Documented here for clinical significance only.  Not managed in this clinic.    4. Bone health: 9/20/2016 Vitamin D level normal.     5.  Right Achilles enthesitis: has followed with podiatry.  Improved with prednisone and a walking boot.  Suspect this is related to the inflammatory arthritis and escalating treatment with sulfasalazine.    6.  Vaccinations: Vaccinations reviewed with Ms. Whitfield.     - Influenza: up to date per patient  - [id number]: up to date  - [id number]: up to date  - COVID-19: Up to date     7.  Elevated creatinine: Avoid oral NSAIDs.  Discussed the importance of hydration before labs.  Advised that she discuss with her primary care provider to determine if additional work-up  is needed.    Total minutes spent in evaluation with patient, documentation, , and review of pertinent studies and chart notes: 16     Ms. Whitfield verbalized agreement with and understanding of the rational for the diagnosis and treatment plan.  All questions were answered to best of my ability and the patient's satisfaction. Ms. Whitfield was advised to contact the clinic with any questions that may arise after the clinic visit.      Thank you for involving me in the care of the patient    Return to clinic: 3 months    HPI   Vida Whitfield is a 43 year old female with a medical history significant for anxiety, depression, chronic idiopathic urticaria, hyperlipidemia, hypothyroidism, rosacea, obstructive sleep apnea, and inflammatory arthritis who presents for follow-up of rheumatoid arthritis.    Historical records in this paragraph: She was previously evaluated by Dr. Blaine Anthony at [facility].  2014 labs show negative: hepatitis C ab, HBV surface ag, HBV core ab, HLA-B27, Sm, RNP, SSA, SSB, Scl-70, DNA, cardiolipin, CCP, PR3, MPO. NIKKIE 1:160 homogeneous.  MPO also positive (one negative and one positive value). Per a 3/11/2016 clinic note, she has seronegative rheumatoid arthritis and chronic urticaria. She developed joint swelling and stiffness in the bilateral MCPs, MTPs, ankles, and knees that started in November 2013 shortly after tapering off prednisone that was used to manage chronic urticaria. No history of psoriasis or inflammatory bowel disease. Negative SI joint x-rays. Negative MRI of the SI joint. Flaring of her urticaria with NSAIDs. Steroid responsive in regard to her joints. Near resolution of joint stiffness and pain with methotrexate. NSAIDs are avoided because they cause flares of her urticaria.    1/23/2023: In the last 1 week she has had more pain at the PIPs > MCPs > left ankle.  No swelling of these joints.  Morning stiffness for about 1 hour.  Pain and stiffness is worse in the  morning and improves with time and activity.  She attributes the increased joint pain to the weather.    4/21/2023: The prednisone course given in January 2023 was effective and resolved the inflammatory arthritis symptoms at the ankles, PIPs, and MCPs.  She then did well for a while but for the past 2 weeks has had right Achilles enthesitis pain that was evaluated by podiatry and she was placed on prednisone with improvement of the Achilles enthesitis, but no other improvement of her joints because they were already doing well.  She is also going to use a walking boot from podiatry.    Today, 7/21/2023: increased pain and stiffness of the knees in the morning that improves with walking around.  Knees bothered with going up and down stairs.  Typically sleeps with knees bent.  Knees without swelling, increased warmth, or overlying erythema.  Knees more symptomatic for about 1.5 months.  Ankles stiff in the morning; duration of about 1 hour, similar to the knees but resolves quicker; duration of 1.5 months.  Heel pain on the right; improved but not resolved; walking boot with some improvement.    No fevers or chills.  No nausea or vomiting.  No blood in her stool.  No oral or nasal sores.  No chest pain/pressure, palpitations, or shortness of breath.  No cough.  No eye pain or redness.    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    ROS   12 point review of system was completed and negative except as noted in the HPI     Active Problem List     Patient Active Problem List   Diagnosis     Contraceptive management     Hyperhidrosis of axilla     CARDIOVASCULAR SCREENING; LDL GOAL LESS THAN 160     Generalized anxiety disorder     Moderate recurrent major depression (H)     Chronic idiopathic urticaria     Hyperlipidemia with target LDL less than 130     Hypothyroidism, unspecified  hypothyroidism type     Rosacea     Non morbid obesity due to excess calories     BRIDGETTE (obstructive sleep apnea)     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 factor (H)     High risk medication use     Obesity (BMI 35.0-39.9) with comorbidity (H)     Hemorrhagic condition, unspecified (H)     Past Medical History     Past Medical History:   Diagnosis Date     Arthritis 01/01/2013     Depressive disorder 01/01/2003     Peripheral autonomic neuropathy in disorders classified elsewhere(337.1)      Pure hypercholesterolemia      Thyroid disease 01/01/1995     Past Surgical History     Past Surgical History:   Procedure Laterality Date     CHOLECYSTECTOMY, LAPOROSCOPIC  2006    Cholecystectomy, Laparoscopic     SURGICAL HISTORY OF -   17 years old    Thyroid ablation     Z APPENDECTOMY  2002     Allergy     Allergies   Allergen Reactions     Macrobid [Nitrofuran Derivatives]      Paxil [Paroxetine]      Anxiety         Zoloft      Mood changes     Current Medication List     Current Outpatient Medications   Medication Sig     ALPRAZolam (XANAX) 0.5 MG tablet Take 1 tablet (0.5 mg) by mouth 3 times daily as needed for anxiety     buPROPion (WELLBUTRIN XL) 150 MG 24 hr tablet Take 3 tablets by mouth once daily     busPIRone (BUSPAR) 15 MG tablet Take 1 tablet (15mg) by mouth twice daily.     cetirizine (ZYRTEC) 10 MG tablet Take 20 mg by mouth daily.     diclofenac (VOLTAREN) 1 % GEL topical gel Apply 2 grams to hands four times daily using enclosed dosing card.     folic acid (FOLVITE) 1 MG tablet Take 1 tablet (1 mg) by mouth daily     hydroxychloroquine (PLAQUENIL) 200 MG tablet Take 2 tablets (400 mg) by mouth daily     levonorgestrel-ethinyl estradiol (SEASONALE) 0.15-0.03 MG tablet Take 1 tablet by mouth daily     levothyroxine (SYNTHROID/LEVOTHROID) 175 MCG tablet Take 1 tablet (175 mcg) by mouth daily     methotrexate sodium 2.5 MG TABS Take 6 tablets (15 mg) by mouth once a week . Take all  "6 tablets on the same day of each week.     Multiple Vitamin (MULTIVITAMIN) per tablet Take 1 tablet by mouth daily.     traZODone (DESYREL) 50 MG tablet Take 1 to 2 tablets by mouth nightly at bedtime. Provider visit due for further refills     order for DME Equipment being ordered: CPAP 6-10 cm     No current facility-administered medications for this visit.         Social History   See HPI    Family History     Family History   Problem Relation Age of Onset     Diabetes Mother      Hypertension Mother      Lipids Mother      Hyperlipidemia Mother      Hypertension Father      Lipids Father      Thyroid Disease Father      Cancer Father      Hyperlipidemia Father      Other Cancer Father         Gastric & Bone Cancer     Diabetes Maternal Grandmother      Diabetes Paternal Grandmother      Melanoma No family hx of        Physical Exam     Temp Readings from Last 3 Encounters:   04/21/23 98.6  F (37  C) (Tympanic)   11/15/21 98.1  F (36.7  C) (Tympanic)   11/16/20 98.3  F (36.8  C) (Tympanic)     BP Readings from Last 5 Encounters:   04/21/23 120/88   11/15/21 128/84   11/16/20 (!) 120/92   12/20/19 129/82   11/07/19 110/82     Pulse Readings from Last 1 Encounters:   04/21/23 87     Resp Readings from Last 1 Encounters:   04/21/23 16     Estimated body mass index is 40.75 kg/m  as calculated from the following:    Height as of 4/21/23: 1.778 m (5' 10\").    Weight as of 4/21/23: 128.8 kg (284 lb).    GEN: NAD. Healthy appearing adult.   HEENT:  Anicteric, noninjected sclera. No obvious external lesions of the ear and nose. Hearing intact.  PULM: No increased work of breathing  MSK:  Hands and wrists without swelling.  Able to make a complete fist with each hand.  SKIN: No rash or jaundice seen  PSYCH: Alert. Appropriate.      Labs / Imaging (select studies)     CBC  Recent Labs   Lab Test 07/18/23  1527 04/17/23  1543 01/19/23  1615 10/24/21  1057 06/24/21  0721 12/16/20  1826 11/16/20  1514 10/15/20  1434   WBC " 7.2 14.9* 7.6   < > 6.8 6.6   < > 7.0   RBC 4.51 4.81 4.25   < > 4.17 4.05   < > 3.84   HGB 13.7 14.6 12.8   < > 13.1 13.0   < > 12.2   HCT 40.6 44.3 37.5   < > 38.1 38.3   < > 35.8   MCV 90 92 88   < > 91 95   < > 93   RDW 13.7 14.4 13.4   < > 13.1 12.9   < > 12.3    220 174   < > 182 175   < > 170   MCH 30.4 30.4 30.1   < > 31.4 32.1   < > 31.8   MCHC 33.7 33.0 34.1   < > 34.4 33.9   < > 34.1   NEUTROPHIL 49 45 42   < > 49.0 45.7  --  44.7   LYMPH 41 44 43   < > 40.5 43.1  --  44.8   MONOCYTE 8 10 9   < > 8.3 10.4  --  10.0   EOSINOPHIL 2 1 6   < > 1.8 0.3  --  0.1   BASOPHIL 1 1 1   < > 0.4 0.5  --  0.4   ANEU  --   --   --   --  3.3 3.0  --  3.1   ALYM  --   --   --   --  2.7 2.8  --  3.1   FREDA  --   --   --   --  0.6 0.7  --  0.7   AEOS  --   --   --   --  0.1 0.0  --  0.0   ABAS  --   --   --   --  0.0 0.0  --  0.0   ANEUTAUTO 3.5 6.7 3.2   < >  --   --   --   --    ALYMPAUTO 2.9 6.5* 3.3   < >  --   --   --   --    AMONOAUTO 0.6 1.5* 0.7   < >  --   --   --   --    AEOSAUTO 0.1 0.2 0.5   < >  --   --   --   --    ABSBASO 0.1 0.1 0.0   < >  --   --   --   --     < > = values in this interval not displayed.     CMP  Recent Labs   Lab Test 07/18/23  1527 04/17/23  1543 01/19/23  1615 10/24/21  1057 06/24/21  0721 12/16/20  1826 10/15/20  1434 12/17/19  1640 10/19/19  1041 12/10/18  1900 09/23/18  1058 01/26/17  0745 12/20/16  1138   NA  --   --   --   --   --   --   --   --   --   --   --   --  138   POTASSIUM  --   --   --   --   --   --   --   --   --   --   --   --  4.4   CHLORIDE  --   --   --   --   --   --   --   --   --   --   --   --  104   CO2  --   --   --   --   --   --   --   --   --   --   --   --  30   ANIONGAP  --   --   --   --   --   --   --   --   --   --   --   --  4   GLC  --   --   --   --   --   --   --   --  81  --  79  --  84   BUN  --   --   --   --   --   --   --   --   --   --   --   --  13   CR 1.25* 1.14* 1.02*   < > 1.03 0.99 0.97   < >  --    < >  --    < > 1.05*    GFRESTIMATED 55* 61 71   < > 68 71 73   < >  --    < >  --    < > 59*   GFRESTBLACK  --   --   --   --  79 83 85   < >  --    < >  --    < > 72   TATI  --   --   --   --   --   --   --   --   --   --   --   --  8.7   BILITOTAL 0.3 0.3 0.4   < > 0.6 0.3 0.4   < >  --    < >  --    < >  --    ALBUMIN 4.1 4.1 3.8   < > 3.3* 3.8 3.5   < >  --    < >  --    < >  --    PROTTOTAL 6.6 7.3 6.8   < > 6.8 7.3 7.2   < >  --    < >  --    < >  --    ALKPHOS 44 48 42   < > 47 57 45   < >  --    < >  --    < >  --    AST 28 23 24   < > 17 16 19   < >  --    < >  --    < >  --    ALT 24 21 21   < > 21 23 22   < >  --    < >  --    < >  --     < > = values in this interval not displayed.     Calcium/VitaminD  Recent Labs   Lab Test 12/20/16  1138 09/20/16  1554   TATI 8.7  --    VITDT  --  36     ESR/CRP  Recent Labs   Lab Test 07/18/23  1527 01/19/23  1615 07/22/22  1528 01/25/22  1508 10/24/21  1057   SED 6 8 8 7 7   CRP  --   --  7.8 8.6* 6.7   CRPI 10.10* 11.24*  --   --   --      Lipid Panel  Recent Labs   Lab Test 10/19/19  1041 09/23/18  1058 09/02/16  0736   CHOL 187 166 157   TRIG 80 91 70   HDL 58 54 57   * 94 86   NHDL 129 112 100     Hepatitis B  Recent Labs   Lab Test 07/18/23 1527   HBCAB Nonreactive   HEPBANG Nonreactive     Hepatitis C  Recent Labs   Lab Test 07/18/23 1527   HCVAB Nonreactive       --------------------------------------------------    From care everywhere:      Immunization History     Immunization History   Administered Date(s) Administered     COVID-19 Monovalent 18+ (Moderna) 08/18/2021, 09/15/2021     FLU 6-35 months 10/30/2006, 11/02/2007, 10/21/2008, 10/12/2009     Flu, Unspecified 10/22/2013, 10/03/2014     [id number] Novel Flu- Nasal 10/21/2009     HepB 07/22/1999     Hepatitis B, Adult 07/22/1999, 08/25/1999, 01/28/2000     Historical DTP/aP 1980, 1980, 1980, 11/02/1982     Influenza (IIV3) PF 11/05/1996, 10/19/2010, 10/31/2019     Influenza Vaccine >6 months  (Alfuria,Fluzone) 10/18/2018, 10/31/2019, 10/29/2020, 10/14/2021, 10/19/2022     MMR 09/07/1992, 08/05/1993     Mantoux Tuberculin Skin Test 02/03/2012, 04/04/2012, 09/30/2014, 10/07/2014     OPV, trivalent, live 1980, 1981, 1981, 11/02/1982     Pneumo Conj 13-V (2010&after) 09/20/2016     Pneumococcal 23 valent 12/20/2016     TD,PF 7+ (Tenivac) 07/14/1995, 01/17/2006     TDAP (Adacel,Boostrix) 12/10/2013            Chart documentation done in part with Dragon Voice recognition Software. Although reviewed after completion, some word and grammatical error may remain.      Video-Visit Details    Type of service:  Video Visit    Video Start Time: 9:35 AM    Video End Time:9:43 AM    Originating Location (pt. Location): Home, [place]    Distant Location (provider location):  Off-site, [place]    Platform used for Video Visit: Fabiano Benítez MD

## 2023-07-20 NOTE — PATIENT INSTRUCTIONS
RHEUMATOLOGY    [facility]  [address] [place] [address]  [place] [address]    Phone number: [phone]  Fax number: [phone]    If you need a medication refill, please contact us as you may need lab work and/or a follow up visit prior to your refill.      Thank you for choosing [facility]!    Nicolette Sarmiento CMA Rheumatology

## 2023-07-21 ENCOUNTER — VIRTUAL VISIT (OUTPATIENT)
Dept: RHEUMATOLOGY | Facility: CLINIC | Age: 43
End: 2023-07-21
Payer: COMMERCIAL

## 2023-07-21 DIAGNOSIS — M06.09 RHEUMATOID ARTHRITIS OF MULTIPLE SITES WITH NEGATIVE RHEUMATOID FACTOR (H): Primary | ICD-10-CM

## 2023-07-21 DIAGNOSIS — Z79.899 HIGH RISK MEDICATION USE: ICD-10-CM

## 2023-07-21 PROCEDURE — 99214 OFFICE O/P EST MOD 30 MIN: CPT | Mod: VID | Performed by: INTERNAL MEDICINE

## 2023-07-21 RX ORDER — FOLIC ACID 1 MG/1
1 TABLET ORAL DAILY
Qty: 90 TABLET | Refills: 2 | Status: SHIPPED | OUTPATIENT
Start: 2023-07-21 | End: 2024-01-30

## 2023-07-21 RX ORDER — SULFASALAZINE 500 MG/1
1000 TABLET, DELAYED RELEASE ORAL 2 TIMES DAILY
Qty: 360 TABLET | Refills: 1 | Status: SHIPPED | OUTPATIENT
Start: 2023-07-21 | End: 2024-01-05

## 2023-07-21 RX ORDER — HYDROXYCHLOROQUINE SULFATE 200 MG/1
400 TABLET, FILM COATED ORAL DAILY
Qty: 180 TABLET | Refills: 2 | Status: SHIPPED | OUTPATIENT
Start: 2023-07-21 | End: 2024-01-30

## 2023-07-21 RX ORDER — METHOTREXATE 2.5 MG/1
15 TABLET ORAL WEEKLY
Qty: 78 TABLET | Refills: 2 | Status: SHIPPED | OUTPATIENT
Start: 2023-07-21 | End: 2024-01-30

## 2023-08-20 ENCOUNTER — LAB (OUTPATIENT)
Dept: LAB | Facility: CLINIC | Age: 43
End: 2023-08-20
Payer: COMMERCIAL

## 2023-08-20 DIAGNOSIS — Z79.899 HIGH RISK MEDICATION USE: ICD-10-CM

## 2023-08-20 DIAGNOSIS — M06.09 RHEUMATOID ARTHRITIS OF MULTIPLE SITES WITH NEGATIVE RHEUMATOID FACTOR (H): ICD-10-CM

## 2023-08-20 LAB
ALBUMIN SERPL BCG-MCNC: 3.8 G/DL (ref 3.5–5.2)
ALP SERPL-CCNC: 43 U/L (ref 35–104)
ALT SERPL W P-5'-P-CCNC: 36 U/L (ref 0–50)
AST SERPL W P-5'-P-CCNC: 34 U/L (ref 0–45)
BASOPHILS # BLD AUTO: 0 10E3/UL (ref 0–0.2)
BASOPHILS NFR BLD AUTO: 1 %
BILIRUB DIRECT SERPL-MCNC: <0.2 MG/DL (ref 0–0.3)
BILIRUB SERPL-MCNC: 0.4 MG/DL
CREAT SERPL-MCNC: 1.14 MG/DL (ref 0.51–0.95)
EOSINOPHIL # BLD AUTO: 0.2 10E3/UL (ref 0–0.7)
EOSINOPHIL NFR BLD AUTO: 4 %
ERYTHROCYTE [DISTWIDTH] IN BLOOD BY AUTOMATED COUNT: 14.2 % (ref 10–15)
GFR SERPL CREATININE-BSD FRML MDRD: 61 ML/MIN/1.73M2
HCT VFR BLD AUTO: 38.5 % (ref 35–47)
HGB BLD-MCNC: 13.1 G/DL (ref 11.7–15.7)
IMM GRANULOCYTES # BLD: 0 10E3/UL
IMM GRANULOCYTES NFR BLD: 0 %
LYMPHOCYTES # BLD AUTO: 2.4 10E3/UL (ref 0.8–5.3)
LYMPHOCYTES NFR BLD AUTO: 49 %
MCH RBC QN AUTO: 31.1 PG (ref 26.5–33)
MCHC RBC AUTO-ENTMCNC: 34 G/DL (ref 31.5–36.5)
MCV RBC AUTO: 91 FL (ref 78–100)
MONOCYTES # BLD AUTO: 0.5 10E3/UL (ref 0–1.3)
MONOCYTES NFR BLD AUTO: 10 %
NEUTROPHILS # BLD AUTO: 1.8 10E3/UL (ref 1.6–8.3)
NEUTROPHILS NFR BLD AUTO: 37 %
PLATELET # BLD AUTO: 170 10E3/UL (ref 150–450)
PROT SERPL-MCNC: 6.3 G/DL (ref 6.4–8.3)
RBC # BLD AUTO: 4.21 10E6/UL (ref 3.8–5.2)
WBC # BLD AUTO: 4.9 10E3/UL (ref 4–11)

## 2023-08-20 PROCEDURE — 80076 HEPATIC FUNCTION PANEL: CPT

## 2023-08-20 PROCEDURE — 85025 COMPLETE CBC W/AUTO DIFF WBC: CPT

## 2023-08-20 PROCEDURE — 82565 ASSAY OF CREATININE: CPT

## 2023-08-20 PROCEDURE — 36415 COLL VENOUS BLD VENIPUNCTURE: CPT

## 2023-09-17 ENCOUNTER — LAB (OUTPATIENT)
Dept: LAB | Facility: CLINIC | Age: 43
End: 2023-09-17
Payer: COMMERCIAL

## 2023-09-17 DIAGNOSIS — Z79.899 HIGH RISK MEDICATION USE: ICD-10-CM

## 2023-09-17 DIAGNOSIS — M06.09 RHEUMATOID ARTHRITIS OF MULTIPLE SITES WITH NEGATIVE RHEUMATOID FACTOR (H): ICD-10-CM

## 2023-09-17 DIAGNOSIS — Z11.4 SCREENING FOR HIV (HUMAN IMMUNODEFICIENCY VIRUS): Primary | ICD-10-CM

## 2023-09-17 LAB
ALBUMIN SERPL BCG-MCNC: 4 G/DL (ref 3.5–5.2)
ALP SERPL-CCNC: 39 U/L (ref 35–104)
ALT SERPL W P-5'-P-CCNC: 17 U/L (ref 0–50)
AST SERPL W P-5'-P-CCNC: 22 U/L (ref 0–45)
BASOPHILS # BLD AUTO: 0 10E3/UL (ref 0–0.2)
BASOPHILS NFR BLD AUTO: 1 %
BILIRUB DIRECT SERPL-MCNC: <0.2 MG/DL (ref 0–0.3)
BILIRUB SERPL-MCNC: 0.5 MG/DL
CREAT SERPL-MCNC: 1.07 MG/DL (ref 0.51–0.95)
EGFRCR SERPLBLD CKD-EPI 2021: 66 ML/MIN/1.73M2
EOSINOPHIL # BLD AUTO: 0.1 10E3/UL (ref 0–0.7)
EOSINOPHIL NFR BLD AUTO: 2 %
ERYTHROCYTE [DISTWIDTH] IN BLOOD BY AUTOMATED COUNT: 14.3 % (ref 10–15)
HCT VFR BLD AUTO: 37.6 % (ref 35–47)
HGB BLD-MCNC: 13.1 G/DL (ref 11.7–15.7)
IMM GRANULOCYTES # BLD: 0 10E3/UL
IMM GRANULOCYTES NFR BLD: 0 %
LYMPHOCYTES # BLD AUTO: 3.2 10E3/UL (ref 0.8–5.3)
LYMPHOCYTES NFR BLD AUTO: 48 %
MCH RBC QN AUTO: 31.7 PG (ref 26.5–33)
MCHC RBC AUTO-ENTMCNC: 34.8 G/DL (ref 31.5–36.5)
MCV RBC AUTO: 91 FL (ref 78–100)
MONOCYTES # BLD AUTO: 0.6 10E3/UL (ref 0–1.3)
MONOCYTES NFR BLD AUTO: 10 %
NEUTROPHILS # BLD AUTO: 2.7 10E3/UL (ref 1.6–8.3)
NEUTROPHILS NFR BLD AUTO: 41 %
PLATELET # BLD AUTO: 170 10E3/UL (ref 150–450)
PROT SERPL-MCNC: 6.6 G/DL (ref 6.4–8.3)
RBC # BLD AUTO: 4.13 10E6/UL (ref 3.8–5.2)
WBC # BLD AUTO: 6.6 10E3/UL (ref 4–11)

## 2023-09-17 PROCEDURE — 85025 COMPLETE CBC W/AUTO DIFF WBC: CPT

## 2023-09-17 PROCEDURE — 80076 HEPATIC FUNCTION PANEL: CPT

## 2023-09-17 PROCEDURE — 36415 COLL VENOUS BLD VENIPUNCTURE: CPT

## 2023-09-17 PROCEDURE — 82565 ASSAY OF CREATININE: CPT

## 2023-10-15 ENCOUNTER — LAB (OUTPATIENT)
Dept: LAB | Facility: CLINIC | Age: 43
End: 2023-10-15
Payer: COMMERCIAL

## 2023-10-15 DIAGNOSIS — M06.09 RHEUMATOID ARTHRITIS OF MULTIPLE SITES WITH NEGATIVE RHEUMATOID FACTOR (H): ICD-10-CM

## 2023-10-15 DIAGNOSIS — Z79.899 HIGH RISK MEDICATION USE: ICD-10-CM

## 2023-10-15 LAB
ALBUMIN SERPL BCG-MCNC: 4 G/DL (ref 3.5–5.2)
ALP SERPL-CCNC: 39 U/L (ref 35–104)
ALT SERPL W P-5'-P-CCNC: 20 U/L (ref 0–50)
AST SERPL W P-5'-P-CCNC: 26 U/L (ref 0–45)
BASO+EOS+MONOS # BLD AUTO: NORMAL 10*3/UL
BASO+EOS+MONOS NFR BLD AUTO: NORMAL %
BASOPHILS # BLD AUTO: 0 10E3/UL (ref 0–0.2)
BASOPHILS NFR BLD AUTO: 1 %
BILIRUB DIRECT SERPL-MCNC: <0.2 MG/DL (ref 0–0.3)
BILIRUB SERPL-MCNC: 0.5 MG/DL
CREAT SERPL-MCNC: 1.22 MG/DL (ref 0.51–0.95)
CRP SERPL-MCNC: 7.85 MG/L
EGFRCR SERPLBLD CKD-EPI 2021: 57 ML/MIN/1.73M2
EOSINOPHIL # BLD AUTO: 0 10E3/UL (ref 0–0.7)
EOSINOPHIL NFR BLD AUTO: 0 %
ERYTHROCYTE [DISTWIDTH] IN BLOOD BY AUTOMATED COUNT: 14.1 % (ref 10–15)
ERYTHROCYTE [SEDIMENTATION RATE] IN BLOOD BY WESTERGREN METHOD: 7 MM/HR (ref 0–20)
HCT VFR BLD AUTO: 37.8 % (ref 35–47)
HGB BLD-MCNC: 13.1 G/DL (ref 11.7–15.7)
IMM GRANULOCYTES # BLD: 0 10E3/UL
IMM GRANULOCYTES NFR BLD: 0 %
LYMPHOCYTES # BLD AUTO: 3.1 10E3/UL (ref 0.8–5.3)
LYMPHOCYTES NFR BLD AUTO: 48 %
MCH RBC QN AUTO: 32.1 PG (ref 26.5–33)
MCHC RBC AUTO-ENTMCNC: 34.7 G/DL (ref 31.5–36.5)
MCV RBC AUTO: 93 FL (ref 78–100)
MONOCYTES # BLD AUTO: 0.6 10E3/UL (ref 0–1.3)
MONOCYTES NFR BLD AUTO: 10 %
NEUTROPHILS # BLD AUTO: 2.7 10E3/UL (ref 1.6–8.3)
NEUTROPHILS NFR BLD AUTO: 42 %
PLATELET # BLD AUTO: 156 10E3/UL (ref 150–450)
PROT SERPL-MCNC: 6.7 G/DL (ref 6.4–8.3)
RBC # BLD AUTO: 4.08 10E6/UL (ref 3.8–5.2)
WBC # BLD AUTO: 6.4 10E3/UL (ref 4–11)

## 2023-10-15 PROCEDURE — 86140 C-REACTIVE PROTEIN: CPT

## 2023-10-15 PROCEDURE — 85025 COMPLETE CBC W/AUTO DIFF WBC: CPT

## 2023-10-15 PROCEDURE — 82565 ASSAY OF CREATININE: CPT

## 2023-10-15 PROCEDURE — 85652 RBC SED RATE AUTOMATED: CPT

## 2023-10-15 PROCEDURE — 80076 HEPATIC FUNCTION PANEL: CPT

## 2023-10-15 PROCEDURE — 36415 COLL VENOUS BLD VENIPUNCTURE: CPT

## 2023-10-24 ENCOUNTER — ANCILLARY PROCEDURE (OUTPATIENT)
Dept: GENERAL RADIOLOGY | Facility: CLINIC | Age: 43
End: 2023-10-24
Attending: INTERNAL MEDICINE
Payer: COMMERCIAL

## 2023-10-24 ENCOUNTER — OFFICE VISIT (OUTPATIENT)
Dept: RHEUMATOLOGY | Facility: CLINIC | Age: 43
End: 2023-10-24
Payer: COMMERCIAL

## 2023-10-24 VITALS
DIASTOLIC BLOOD PRESSURE: 82 MMHG | SYSTOLIC BLOOD PRESSURE: 135 MMHG | BODY MASS INDEX: 40.89 KG/M2 | HEART RATE: 73 BPM | WEIGHT: 285 LBS | RESPIRATION RATE: 16 BRPM | OXYGEN SATURATION: 97 %

## 2023-10-24 DIAGNOSIS — M06.09 RHEUMATOID ARTHRITIS OF MULTIPLE SITES WITH NEGATIVE RHEUMATOID FACTOR (H): ICD-10-CM

## 2023-10-24 DIAGNOSIS — M06.09 RHEUMATOID ARTHRITIS OF MULTIPLE SITES WITH NEGATIVE RHEUMATOID FACTOR (H): Primary | ICD-10-CM

## 2023-10-24 DIAGNOSIS — R79.89 ELEVATED SERUM CREATININE: ICD-10-CM

## 2023-10-24 DIAGNOSIS — Z79.899 HIGH RISK MEDICATION USE: ICD-10-CM

## 2023-10-24 LAB
B BURGDOR IGG+IGM SER QL: 0.11
CREAT SERPL-MCNC: 1.1 MG/DL (ref 0.51–0.95)
EGFRCR SERPLBLD CKD-EPI 2021: 64 ML/MIN/1.73M2
HIV 1+2 AB+HIV1 P24 AG SERPL QL IA: NONREACTIVE

## 2023-10-24 PROCEDURE — 87389 HIV-1 AG W/HIV-1&-2 AB AG IA: CPT | Performed by: INTERNAL MEDICINE

## 2023-10-24 PROCEDURE — 71046 X-RAY EXAM CHEST 2 VIEWS: CPT | Mod: TC | Performed by: RADIOLOGY

## 2023-10-24 PROCEDURE — 82565 ASSAY OF CREATININE: CPT | Performed by: INTERNAL MEDICINE

## 2023-10-24 PROCEDURE — 99214 OFFICE O/P EST MOD 30 MIN: CPT | Performed by: INTERNAL MEDICINE

## 2023-10-24 PROCEDURE — 73130 X-RAY EXAM OF HAND: CPT | Mod: TC | Performed by: RADIOLOGY

## 2023-10-24 PROCEDURE — 73630 X-RAY EXAM OF FOOT: CPT | Mod: TC | Performed by: RADIOLOGY

## 2023-10-24 PROCEDURE — 86481 TB AG RESPONSE T-CELL SUSP: CPT | Performed by: INTERNAL MEDICINE

## 2023-10-24 PROCEDURE — 36415 COLL VENOUS BLD VENIPUNCTURE: CPT | Performed by: INTERNAL MEDICINE

## 2023-10-24 PROCEDURE — 86618 LYME DISEASE ANTIBODY: CPT | Performed by: INTERNAL MEDICINE

## 2023-10-24 NOTE — PATIENT INSTRUCTIONS
RHEUMATOLOGY    [facility]  [address] [place] [address]  [place] [address]    Phone number: [phone]  Fax number: [phone]    If you need a medication refill, please contact us as you may need lab work and/or a follow up visit prior to your refill.      Thank you for choosing [facility]!    Nicolette Sarmiento CMA Rheumatology

## 2023-10-24 NOTE — NURSING NOTE
RAPID3 (0-30) Cumulative Score  6.8          RAPID3 Weighted Score (divide #4 by 3 and that is the weighted score)  2.2

## 2023-10-24 NOTE — PROGRESS NOTES
Rheumatology Clinic Visit      Vida Whitfield MRN# [id number]   YOB: 1980 Age: 43 year old      Date of visit: 10/24/23   PCP: Dr. Fco Medeiros  Ophthalmology: [facility] in [place], [place]    Chief Complaint   Patient presents with:  Rheumatoid Arthritis    Assessment and Plan     1. Seronegative nonerosive rheumatoid arthritis: Currently on methotrexate 15 mg once weekly (Cr elevation possibly secondary to MTX so MTX was reduced with improvement of Cr), folic acid 1 mg daily, sulfasalazine 1000 mg twice daily, hydroxychloroquine 400 mg daily.  Mild right Achilles enthesitis, and synovitis of the left wrist, right second and third MCPs; because of increased disease activity discussed other treatment options and will start Humira or biosimilar if required by insurance.  Screening labs and x-ray today.  Update x-rays of the hands and feet to assess for progressive changes of RA.    If doing well after addition of biologic DMARD then consider reducing conventional DMARD.  Chronic illness, progressive.    - Continue methotrexate 15mg once weekly  - Continue folic acid 1 mg daily  - Continue hydroxychloroquine 400mg daily (last eye exam performed at [facility] on 7/18/2022; yearly eye exam required and advised that she call to schedule)  - Continue sulfasalazine 1000 mg twice daily  - Start Humira 40 mg SQ every 14 days if screening is okay  - Avoid oral NSAIDs because they are historically triggers for urticaria and because of creatinine elevation; tolerates voltaren gel  - X-rays today: Chest, bilateral hand/feet  - Labs today: HIV, QuantiFERON-TB gold plus, Lyme  - Labs in 3 months: CBC, Creatinine, Hepatic Panel, ESR, CRP    # Adalimumab (Humira and biosimilars) Risks and Benefits: The risks and benefits of adalimumab were discussed in detail and the patient verbalized understanding.  The risks discussed include, but are not limited to, the risk for hypersensitivity, anaphylaxis,  anaphylactoid reactions, an increased risk for serious infections leading to hospitalization or death, a possible increased risk for lymphoma and other malignancies, a possible worsening of demyelinating diseases, a possible worsening of heart failure, risk for cytopenias, risk for drug induced lupus, possible reactivation of hepatitis B, and possible reactivation of latent tuberculosis.  Subcutaneous injections may result in injection site reactions and/or pain at the site of injection.  The most common adverse reactions are infections, injection site reactions, headache, and rash.  It was discussed that the medication would need to be discontinued if a serious infection develops.  It was discussed that live vaccinations should not be received while using adalimumab or within 30 days prior to starting adalimumab.  I encouraged reviewing the package insert and asking any questions about the medication.      High risk medication requiring intensive toxicity monitoring at least quarterly.     # On birth control per patient    2. Bilateral patellofemoral syndrome: Previous steroid injections have been effective; repeat injection not needed at this time.  Knee symptoms are currently inflammatory in nature.      3. Urticaria: Historically, oral NSAIDs are triggers.  Documented here for clinical significance only.  Not managed in this clinic.    4. Bone health: 9/20/2016 Vitamin D level normal.     5.  Right Achilles enthesitis: has followed with podiatry.  Improved with prednisone and a walking boot.  Suspect this is related to the inflammatory arthritis and adjusting treatment as noted in #1    6.  Vaccinations: Vaccinations reviewed with Ms. Whitfield.     - Influenza: up to date per patient  - Uyxibeq90: up to date  - [id number]: up to date  - COVID-19: Up to date     7.  Elevated creatinine: Avoid oral NSAIDs.  Was fasting for last lab.  Recheck creatinine with today's labs  - Lab today: creatinine    Total minutes spent  in evaluation with patient, documentation, , and review of pertinent studies and chart notes: 20     Ms. Whitfield verbalized agreement with and understanding of the rational for the diagnosis and treatment plan.  All questions were answered to best of my ability and the patient's satisfaction. Ms. Whitfield was advised to contact the clinic with any questions that may arise after the clinic visit.      Thank you for involving me in the care of the patient    Return to clinic: 3 months    HPI   Vida Whitfield is a 43 year old female with a medical history significant for anxiety, depression, chronic idiopathic urticaria, hyperlipidemia, hypothyroidism, rosacea, obstructive sleep apnea, and inflammatory arthritis who presents for follow-up of rheumatoid arthritis.    Historical records in this paragraph: She was previously evaluated by Dr. Blaine Anthony at [facility].  2014 labs show negative: hepatitis C ab, HBV surface ag, HBV core ab, HLA-B27, Sm, RNP, SSA, SSB, Scl-70, DNA, cardiolipin, CCP, PR3, MPO. NIKKIE 1:160 homogeneous.  MPO also positive (one negative and one positive value). Per a 3/11/2016 clinic note, she has seronegative rheumatoid arthritis and chronic urticaria. She developed joint swelling and stiffness in the bilateral MCPs, MTPs, ankles, and knees that started in November 2013 shortly after tapering off prednisone that was used to manage chronic urticaria. No history of psoriasis or inflammatory bowel disease. Negative SI joint x-rays. Negative MRI of the SI joint. Flaring of her urticaria with NSAIDs. Steroid responsive in regard to her joints. Near resolution of joint stiffness and pain with methotrexate. NSAIDs are avoided because they cause flares of her urticaria.    1/23/2023: In the last 1 week she has had more pain at the PIPs > MCPs > left ankle.  No swelling of these joints.  Morning stiffness for about 1 hour.  Pain and stiffness is worse in the morning and improves with time and  activity.  She attributes the increased joint pain to the weather.    4/21/2023: The prednisone course given in January 2023 was effective and resolved the inflammatory arthritis symptoms at the ankles, PIPs, and MCPs.  She then did well for a while but for the past 2 weeks has had right Achilles enthesitis pain that was evaluated by podiatry and she was placed on prednisone with improvement of the Achilles enthesitis, but no other improvement of her joints because they were already doing well.  She is also going to use a walking boot from podiatry.    7/21/2023: increased pain and stiffness of the knees in the morning that improves with walking around.  Knees bothered with going up and down stairs.  Typically sleeps with knees bent.  Knees without swelling, increased warmth, or overlying erythema.  Knees more symptomatic for about 1.5 months.  Ankles stiff in the morning; duration of about 1 hour, similar to the knees but resolves quicker; duration of 1.5 months.  Heel pain on the right; improved but not resolved; walking boot with some improvement.    Today, 10/24/2023: Pain of the right second and third MCPs, left wrist, and both shoulders; pain is worse in the morning and improves with time and activity.  Morning stiffness for 2 hours; positive gelling phenomenon.  Still with pain at the right Achilles enthesis that is better with wearing supportive shoes that have adequate padding     No fevers or chills.  No nausea or vomiting.  No blood in her stool.  No oral or nasal sores.  No chest pain/pressure, palpitations, or shortness of breath.  No cough.  No eye pain or redness.    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    ROS   12 point review of system was completed and negative except as noted in the HPI     Active Problem List     Patient Active Problem List   Diagnosis     Contraceptive management    Hyperhidrosis of axilla    CARDIOVASCULAR SCREENING; LDL GOAL LESS THAN 160    Generalized anxiety disorder    Moderate recurrent major depression (H)    Chronic idiopathic urticaria    Hyperlipidemia with target LDL less than 130    Hypothyroidism, unspecified hypothyroidism type    Rosacea    Non morbid obesity due to excess calories    BRIDGETTE (obstructive sleep apnea)    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 factor (H)    High risk medication use    Obesity (BMI 35.0-39.9) with comorbidity (H)    Hemorrhagic condition, unspecified (H24)     Past Medical History     Past Medical History:   Diagnosis Date    Arthritis 01/01/2013    Depressive disorder 01/01/2003    Peripheral autonomic neuropathy in disorders classified elsewhere(337.1)     Pure hypercholesterolemia     Thyroid disease 01/01/1995     Past Surgical History     Past Surgical History:   Procedure Laterality Date    CHOLECYSTECTOMY, LAPOROSCOPIC  2006    Cholecystectomy, Laparoscopic    SURGICAL HISTORY OF -   17 years old    Thyroid ablation    ZZC APPENDECTOMY  2002     Allergy     Allergies   Allergen Reactions    Macrobid [Nitrofuran Derivatives]     Paxil [Paroxetine]      Anxiety        Zoloft      Mood changes     Current Medication List     Current Outpatient Medications   Medication Sig    ALPRAZolam (XANAX) 0.5 MG tablet Take 1 tablet (0.5 mg) by mouth 3 times daily as needed for anxiety    buPROPion (WELLBUTRIN XL) 150 MG 24 hr tablet Take 3 tablets by mouth once daily    busPIRone (BUSPAR) 15 MG tablet Take 1 tablet (15mg) by mouth twice daily.    cetirizine (ZYRTEC) 10 MG tablet Take 20 mg by mouth daily.    diclofenac (VOLTAREN) 1 % GEL topical gel Apply 2 grams to hands four times daily using enclosed dosing card.    folic acid (FOLVITE) 1 MG tablet Take 1 tablet (1 mg) by mouth daily    hydroxychloroquine (PLAQUENIL) 200 MG tablet Take 2 tablets (400 mg) by mouth daily     "levonorgestrel-ethinyl estradiol (SEASONALE) 0.15-0.03 MG tablet Take 1 tablet by mouth daily    levothyroxine (SYNTHROID/LEVOTHROID) 175 MCG tablet Take 1 tablet (175 mcg) by mouth daily    methotrexate sodium 2.5 MG TABS Take 6 tablets (15 mg) by mouth once a week . Take all 6 tablets on the same day of each week.    Multiple Vitamin (MULTIVITAMIN) per tablet Take 1 tablet by mouth daily.    sulfaSALAzine ER (AZULFIDINE EN) 500 MG EC tablet Take 2 tablets (1,000 mg) by mouth 2 times daily    traZODone (DESYREL) 50 MG tablet Take 1 to 2 tablets by mouth nightly at bedtime. Provider visit due for further refills    order for DME Equipment being ordered: CPAP 6-10 cm     No current facility-administered medications for this visit.         Social History   See HPI    Family History     Family History   Problem Relation Age of Onset    Diabetes Mother     Hypertension Mother     Lipids Mother     Hyperlipidemia Mother     Hypertension Father     Lipids Father     Thyroid Disease Father     Cancer Father     Hyperlipidemia Father     Other Cancer Father         Gastric & Bone Cancer    Diabetes Maternal Grandmother     Diabetes Paternal Grandmother     Melanoma No family hx of        Physical Exam     Temp Readings from Last 3 Encounters:   04/21/23 98.6  F (37  C) (Tympanic)   11/15/21 98.1  F (36.7  C) (Tympanic)   11/16/20 98.3  F (36.8  C) (Tympanic)     BP Readings from Last 5 Encounters:   10/24/23 135/82   04/21/23 120/88   11/15/21 128/84   11/16/20 (!) 120/92   12/20/19 129/82     Pulse Readings from Last 1 Encounters:   10/24/23 73     Resp Readings from Last 1 Encounters:   10/24/23 16     Estimated body mass index is 40.89 kg/m  as calculated from the following:    Height as of 4/21/23: 1.778 m (5' 10\").    Weight as of this encounter: 129.3 kg (285 lb).    GEN: NAD.   HEENT:  Anicteric, noninjected sclera. No obvious external lesions of the ear and nose. Hearing intact.  PULM: No increased work of " breathing.   MSK: Synovial swelling and tenderness to palpation of the right second-third MCPs; tenderness to palpation without synovial swelling of the left second-third MCPs.  PIPs diffusely tender to palpation but no swelling.  Left wrist with mild synovial swelling but no tenderness to palpation, increased warmth, or overlying erythema.  Right wrist without swelling or tenderness to palpation.   Elbows without swelling or tenderness to palpation.  Both shoulders diffusely tender to palpation but no swelling or increased warmth; normal range of motion.  Knees, ankles, and MTPs without swelling or tenderness to palpation.  Mildly tender to palpation at the right Achilles enthesis, but no swelling, increased warmth, or overlying erythema.  SKIN: No rash or jaundice seen  PSYCH: Alert. Appropriate.       Labs / Imaging (select studies)     CBC  Recent Labs   Lab Test 10/15/23  1135 09/17/23  1126 08/20/23  1124 10/24/21  1057 06/24/21  0721 12/16/20  1826 11/16/20  1514 10/15/20  1434   WBC 6.4 6.6 4.9   < > 6.8 6.6   < > 7.0   RBC 4.08 4.13 4.21   < > 4.17 4.05   < > 3.84   HGB 13.1 13.1 13.1   < > 13.1 13.0   < > 12.2   HCT 37.8 37.6 38.5   < > 38.1 38.3   < > 35.8   MCV 93 91 91   < > 91 95   < > 93   RDW 14.1 14.3 14.2   < > 13.1 12.9   < > 12.3    170 170   < > 182 175   < > 170   MCH 32.1 31.7 31.1   < > 31.4 32.1   < > 31.8   MCHC 34.7 34.8 34.0   < > 34.4 33.9   < > 34.1   NEUTROPHIL 42 41 37   < > 49.0 45.7  --  44.7   LYMPH 48 48 49   < > 40.5 43.1  --  44.8   MONOCYTE 10 10 10   < > 8.3 10.4  --  10.0   EOSINOPHIL 0 2 4   < > 1.8 0.3  --  0.1   BASOPHIL 1 1 1   < > 0.4 0.5  --  0.4   ANEU  --   --   --   --  3.3 3.0  --  3.1   ALYM  --   --   --   --  2.7 2.8  --  3.1   FREDA  --   --   --   --  0.6 0.7  --  0.7   AEOS  --   --   --   --  0.1 0.0  --  0.0   ABAS  --   --   --   --  0.0 0.0  --  0.0   ANEUTAUTO 2.7 2.7 1.8   < >  --   --   --   --    ALYMPAUTO 3.1 3.2 2.4   < >  --   --   --   --     AMONOAUTO 0.6 0.6 0.5   < >  --   --   --   --    AEOSAUTO 0.0 0.1 0.2   < >  --   --   --   --    ABSBASO 0.0 0.0 0.0   < >  --   --   --   --     < > = values in this interval not displayed.     CMP  Recent Labs   Lab Test 10/15/23  1135 09/17/23  1126 08/20/23  1124 10/24/21  1057 06/24/21  0721 12/16/20  1826 10/15/20  1434 12/17/19  1640 10/19/19  1041 12/10/18  1900 09/23/18  1058 01/26/17  0745 12/20/16  1138   NA  --   --   --   --   --   --   --   --   --   --   --   --  138   POTASSIUM  --   --   --   --   --   --   --   --   --   --   --   --  4.4   CHLORIDE  --   --   --   --   --   --   --   --   --   --   --   --  104   CO2  --   --   --   --   --   --   --   --   --   --   --   --  30   ANIONGAP  --   --   --   --   --   --   --   --   --   --   --   --  4   GLC  --   --   --   --   --   --   --   --  81  --  79  --  84   BUN  --   --   --   --   --   --   --   --   --   --   --   --  13   CR 1.22* 1.07* 1.14*   < > 1.03 0.99 0.97   < >  --    < >  --    < > 1.05*   GFRESTIMATED 57* 66 61   < > 68 71 73   < >  --    < >  --    < > 59*   GFRESTBLACK  --   --   --   --  79 83 85   < >  --    < >  --    < > 72   TATI  --   --   --   --   --   --   --   --   --   --   --   --  8.7   BILITOTAL 0.5 0.5 0.4   < > 0.6 0.3 0.4   < >  --    < >  --    < >  --    ALBUMIN 4.0 4.0 3.8   < > 3.3* 3.8 3.5   < >  --    < >  --    < >  --    PROTTOTAL 6.7 6.6 6.3*   < > 6.8 7.3 7.2   < >  --    < >  --    < >  --    ALKPHOS 39 39 43   < > 47 57 45   < >  --    < >  --    < >  --    AST 26 22 34   < > 17 16 19   < >  --    < >  --    < >  --    ALT 20 17 36   < > 21 23 22   < >  --    < >  --    < >  --     < > = values in this interval not displayed.     Calcium/VitaminD  Recent Labs   Lab Test 12/20/16  1138 09/20/16  1554   TATI 8.7  --    VITDT  --  36     ESR/CRP  Recent Labs   Lab Test 10/15/23  1135 07/18/23  1527 01/19/23  1615 07/22/22  1528 01/25/22  1508 10/24/21  1057   SED 7 6 8 8 7 7   CRP  --   --   --   "7.8 8.6* 6.7   CRPI 7.85* 10.10* 11.24*  --   --   --      Lipid Panel  Recent Labs   Lab Test 10/19/19  1041 09/23/18  1058 09/02/16  0736   CHOL 187 166 157   TRIG 80 91 70   HDL 58 54 57   * 94 86   NHDL 129 112 100     Hepatitis B  Recent Labs   Lab Test 07/18/23  1527   HBCAB Nonreactive   HEPBANG Nonreactive     Hepatitis C  Recent Labs   Lab Test 07/18/23  1527   HCVAB Nonreactive     Lyme ab screening  No results for input(s): \"LYMEGM\" in the last 72145 hours.  Lyme confirmation testing by Western Blot  No results for input(s): \"LYWG\", \"LYWM\" in the last 10264 hours.  Tuberculosis Screening  No results for input(s): \"TBRES\", \"TBRSLT\", \"TBAGN\", \"TBRST\" in the last 90716 hours.    Invalid input(s): \"QFT\", \"QPLUS\", \"IGRA\"  HIV Screening  No results for input(s): \"HIAGAB\" in the last 49022 hours.    --------------------------------------------------    From care everywhere:      Immunization History     Immunization History   Administered Date(s) Administered    COVID-19 Monovalent 18+ (Moderna) 08/18/2021, 09/15/2021    FLU 6-35 months 10/30/2006, 11/02/2007, 10/21/2008, 10/12/2009    Flu, Unspecified 10/22/2013, 10/03/2014    [id number] Novel Flu- Nasal 10/21/2009    HepB 07/22/1999    Hepatitis B, Adult 07/22/1999, 08/25/1999, 01/28/2000    Historical DTP/aP 1980, 1980, 1981, 11/02/1982    Influenza (IIV3) PF 11/05/1996, 10/19/2010, 10/31/2019    Influenza Vaccine >6 months (Alfuria,Fluzone) 10/18/2018, 10/31/2019, 10/29/2020, 10/14/2021, 10/19/2022    MMR 09/07/1992, 08/05/1993    Mantoux Tuberculin Skin Test 02/03/2012, 04/04/2012, 09/30/2014, 10/07/2014    OPV, trivalent, live 1981, 1981, 1981, 11/02/1982    Pneumo Conj 13-V (2010&after) 09/20/2016    Pneumococcal 23 valent 12/20/2016    TD,PF 7+ (Tenivac) 07/14/1995, 01/17/2006    TDAP (Adacel,Boostrix) 12/10/2013            Chart documentation done in part with Dragon Voice recognition Software. Although reviewed " after completion, some word and grammatical error may remain.      Andrea Benítez MD

## 2023-10-25 LAB
GAMMA INTERFERON BACKGROUND BLD IA-ACNC: 0 IU/ML
M TB IFN-G BLD-IMP: NEGATIVE
M TB IFN-G CD4+ BCKGRND COR BLD-ACNC: 10 IU/ML
MITOGEN IGNF BCKGRD COR BLD-ACNC: 0 IU/ML
MITOGEN IGNF BCKGRD COR BLD-ACNC: 0 IU/ML
QUANTIFERON MITOGEN: 10 IU/ML
QUANTIFERON NIL TUBE: 0 IU/ML
QUANTIFERON TB1 TUBE: 0 IU/ML
QUANTIFERON TB2 TUBE: 0

## 2023-10-26 ENCOUNTER — TELEPHONE (OUTPATIENT)
Dept: RHEUMATOLOGY | Facility: CLINIC | Age: 43
End: 2023-10-26
Payer: COMMERCIAL

## 2023-10-26 DIAGNOSIS — M06.09 RHEUMATOID ARTHRITIS OF MULTIPLE SITES WITH NEGATIVE RHEUMATOID FACTOR (H): Primary | ICD-10-CM

## 2023-10-26 NOTE — TELEPHONE ENCOUNTER
PA Initiation    Medication: HUMIRA *CF* PEN 40 MG/0.4ML SC PNKT  Insurance Company: MEDICA - Phone [phone] Fax [phone]:  1medica  Pharmacy Filling the Rx: [address]  Filling Pharmacy Phone:    Filling Pharmacy Fax:    Start Date: 10/26/2023  BETY Key: [id number]        Thank You,     Katherin Castellano, [facility]  Specialty Pharmacy Clinic [facility] Specialty  [email or web address]  [email or web address]  Phone: [phone]  Fax: [phone]

## 2023-10-27 NOTE — TELEPHONE ENCOUNTER
Prior Authorization Approval    Medication: HUMIRA *CF* PEN 40 MG/0.4ML SC PNKT  Authorization Effective Date: 9/26/2023  Authorization Expiration Date: 4/23/2024  Approved Dose/Quantity: 2 / 28  Reference #: BETY Key: [id number]   Insurance Company: MEDICA - Phone [phone] Fax [phone]:  1medica  Expected CoPay: $ 5  CoPay Card Available: Yes    Financial Assistance Needed: [email or web address]  Which Pharmacy is filling the prescription: [address]  Pharmacy Notified: [facility]  Patient Notified: Ese           Thank You,     Katherin Castellano [facility]  Specialty Pharmacy Clinic [facility] Specialty  [email or web address]  [email or web address]  Phone: [phone]  Fax: [phone]

## 2023-12-20 ASSESSMENT — ENCOUNTER SYMPTOMS
CONSTIPATION: 0
EYE PAIN: 0
HEARTBURN: 0
CHILLS: 0
HEADACHES: 0
FEVER: 0
FREQUENCY: 0
HEMATOCHEZIA: 0
PARESTHESIAS: 0
NAUSEA: 0
ABDOMINAL PAIN: 0
DIZZINESS: 0
DYSURIA: 0
ARTHRALGIAS: 0
PALPITATIONS: 0
DIARRHEA: 0
NERVOUS/ANXIOUS: 0
SORE THROAT: 0
WEAKNESS: 0
COUGH: 0
SHORTNESS OF BREATH: 0
JOINT SWELLING: 0
BREAST MASS: 0
MYALGIAS: 0
HEMATURIA: 0

## 2023-12-27 ENCOUNTER — OFFICE VISIT (OUTPATIENT)
Dept: FAMILY MEDICINE | Facility: CLINIC | Age: 43
End: 2023-12-27
Payer: COMMERCIAL

## 2023-12-27 VITALS
DIASTOLIC BLOOD PRESSURE: 86 MMHG | SYSTOLIC BLOOD PRESSURE: 122 MMHG | HEIGHT: 70 IN | BODY MASS INDEX: 40.23 KG/M2 | RESPIRATION RATE: 18 BRPM | OXYGEN SATURATION: 98 % | TEMPERATURE: 98 F | WEIGHT: 281 LBS | HEART RATE: 87 BPM

## 2023-12-27 DIAGNOSIS — R68.82 DECREASED LIBIDO: ICD-10-CM

## 2023-12-27 DIAGNOSIS — E78.5 HYPERLIPIDEMIA WITH TARGET LDL LESS THAN 130: Chronic | ICD-10-CM

## 2023-12-27 DIAGNOSIS — Z12.4 CERVICAL CANCER SCREENING: ICD-10-CM

## 2023-12-27 DIAGNOSIS — Z13.1 SCREENING FOR DIABETES MELLITUS: ICD-10-CM

## 2023-12-27 DIAGNOSIS — L03.90 CELLULITIS, UNSPECIFIED CELLULITIS SITE: ICD-10-CM

## 2023-12-27 DIAGNOSIS — E89.0 POSTOPERATIVE HYPOTHYROIDISM: ICD-10-CM

## 2023-12-27 DIAGNOSIS — Z00.00 ENCOUNTER FOR ROUTINE ADULT HEALTH EXAMINATION WITHOUT ABNORMAL FINDINGS: Primary | ICD-10-CM

## 2023-12-27 LAB
CHOLEST SERPL-MCNC: 160 MG/DL
FASTING STATUS PATIENT QL REPORTED: YES
FASTING STATUS PATIENT QL REPORTED: YES
GLUCOSE SERPL-MCNC: 88 MG/DL (ref 70–99)
HDLC SERPL-MCNC: 52 MG/DL
LDLC SERPL CALC-MCNC: 76 MG/DL
NONHDLC SERPL-MCNC: 108 MG/DL
TRIGL SERPL-MCNC: 160 MG/DL
TSH SERPL DL<=0.005 MIU/L-ACNC: 1.67 UIU/ML (ref 0.3–4.2)

## 2023-12-27 PROCEDURE — 84443 ASSAY THYROID STIM HORMONE: CPT | Performed by: FAMILY MEDICINE

## 2023-12-27 PROCEDURE — 87624 HPV HI-RISK TYP POOLED RSLT: CPT | Performed by: FAMILY MEDICINE

## 2023-12-27 PROCEDURE — 90471 IMMUNIZATION ADMIN: CPT | Performed by: FAMILY MEDICINE

## 2023-12-27 PROCEDURE — 99396 PREV VISIT EST AGE 40-64: CPT | Mod: 25 | Performed by: FAMILY MEDICINE

## 2023-12-27 PROCEDURE — 36415 COLL VENOUS BLD VENIPUNCTURE: CPT | Performed by: FAMILY MEDICINE

## 2023-12-27 PROCEDURE — 90715 TDAP VACCINE 7 YRS/> IM: CPT | Performed by: FAMILY MEDICINE

## 2023-12-27 PROCEDURE — 99214 OFFICE O/P EST MOD 30 MIN: CPT | Mod: 25 | Performed by: FAMILY MEDICINE

## 2023-12-27 PROCEDURE — 80061 LIPID PANEL: CPT | Performed by: FAMILY MEDICINE

## 2023-12-27 PROCEDURE — 82947 ASSAY GLUCOSE BLOOD QUANT: CPT | Performed by: FAMILY MEDICINE

## 2023-12-27 PROCEDURE — G0145 SCR C/V CYTO,THINLAYER,RESCR: HCPCS | Performed by: FAMILY MEDICINE

## 2023-12-27 RX ORDER — CEPHALEXIN 500 MG/1
500 CAPSULE ORAL 3 TIMES DAILY
Qty: 30 CAPSULE | Refills: 0 | Status: SHIPPED | OUTPATIENT
Start: 2023-12-27 | End: 2024-01-06

## 2023-12-27 ASSESSMENT — ANXIETY QUESTIONNAIRES
6. BECOMING EASILY ANNOYED OR IRRITABLE: SEVERAL DAYS
3. WORRYING TOO MUCH ABOUT DIFFERENT THINGS: NOT AT ALL
2. NOT BEING ABLE TO STOP OR CONTROL WORRYING: NOT AT ALL
1. FEELING NERVOUS, ANXIOUS, OR ON EDGE: SEVERAL DAYS
7. FEELING AFRAID AS IF SOMETHING AWFUL MIGHT HAPPEN: NOT AT ALL
GAD7 TOTAL SCORE: 2
5. BEING SO RESTLESS THAT IT IS HARD TO SIT STILL: NOT AT ALL
4. TROUBLE RELAXING: NOT AT ALL
IF YOU CHECKED OFF ANY PROBLEMS ON THIS QUESTIONNAIRE, HOW DIFFICULT HAVE THESE PROBLEMS MADE IT FOR YOU TO DO YOUR WORK, TAKE CARE OF THINGS AT HOME, OR GET ALONG WITH OTHER PEOPLE: NOT DIFFICULT AT ALL
GAD7 TOTAL SCORE: 2

## 2023-12-27 ASSESSMENT — ENCOUNTER SYMPTOMS
NERVOUS/ANXIOUS: 0
COUGH: 0
CHILLS: 0
CONSTIPATION: 0
EYE PAIN: 0
ABDOMINAL PAIN: 0
NAUSEA: 0
DYSURIA: 0
HEADACHES: 0
DIZZINESS: 0
SORE THROAT: 0
PARESTHESIAS: 0
DIARRHEA: 0
HEARTBURN: 0
JOINT SWELLING: 0
FREQUENCY: 0
ARTHRALGIAS: 0
HEMATURIA: 0
PALPITATIONS: 0
BREAST MASS: 0
MYALGIAS: 0
SHORTNESS OF BREATH: 0
WEAKNESS: 0
HEMATOCHEZIA: 0
FEVER: 0

## 2023-12-27 ASSESSMENT — PATIENT HEALTH QUESTIONNAIRE - PHQ9
SUM OF ALL RESPONSES TO PHQ QUESTIONS 1-9: 3
10. IF YOU CHECKED OFF ANY PROBLEMS, HOW DIFFICULT HAVE THESE PROBLEMS MADE IT FOR YOU TO DO YOUR WORK, TAKE CARE OF THINGS AT HOME, OR GET ALONG WITH OTHER PEOPLE: SOMEWHAT DIFFICULT
SUM OF ALL RESPONSES TO PHQ QUESTIONS 1-9: 3

## 2023-12-27 ASSESSMENT — PAIN SCALES - GENERAL: PAINLEVEL: MILD PAIN (3)

## 2023-12-27 NOTE — NURSING NOTE
Prior to immunization administration, verified patients identity using patient s name and date of birth. Please see Immunization Activity for additional information.     Screening Questionnaire for Adult Immunization    Are you sick today?   No   Do you have allergies to medications, food, a vaccine component or latex?   Yes   Have you ever had a serious reaction after receiving a vaccination?   No   Do you have a long-term health problem with heart, lung, kidney, or metabolic disease (e.g., diabetes), asthma, a blood disorder, no spleen, complement component deficiency, a cochlear implant, or a spinal fluid leak?  Are you on long-term aspirin therapy?   No   Do you have cancer, leukemia, HIV/AIDS, or any other immune system problem?   No   Do you have a parent, brother, or sister with an immune system problem?   No   In the past 3 months, have you taken medications that affect  your immune system, such as prednisone, other steroids, or anticancer drugs; drugs for the treatment of rheumatoid arthritis, Crohn s disease, or psoriasis; or have you had radiation treatments?   Yes   Have you had a seizure, or a brain or other nervous system problem?   No   During the past year, have you received a transfusion of blood or blood    products, or been given immune (gamma) globulin or antiviral drug?   No   For women: Are you pregnant or is there a chance you could become       pregnant during the next month?   No   Have you received any vaccinations in the past 4 weeks?   No     Immunization questionnaire were fine to give for this injection as it is not a live injection.      Patient instructed to remain in clinic for 15 minutes afterwards, and to report any adverse reactions.     Screening performed by Aria Conde CMA on 12/27/2023 at 7:27 AM.

## 2023-12-27 NOTE — PROGRESS NOTES
"   SUBJECTIVE:   Vida is a 43 year old, presenting for the following:    Patient is a 43 yr old female here for her annual physical. She has a past medical history significant for hypothyroidism, rheumatoid arthritis, obesity, depression, anxiety. She sees a rheumatologist for her rheumatoid arthritis.Patient reports that she has an ingrown hair follicle that appears infected. She says she has these recurrently and most times they \"pop\" on their own. Patient states that she has had some pain in the area.   Other concerns today is that she has noticed that she has no sex drive. This has been ongoing for years and it is causing her some distress.   Patient is requesting some blood work done today.   Physical (Pap and physical exam.), Blood Draw (Patient is fasting today for labs.), and Imm/Inj (Due to update her tetanus injection.  Declined Covid.  Discuss if she is due for a Prevnar 20.)        12/27/2023     7:00 AM   Additional Questions   Roomed by Aria Conde CMA       Healthy Habits:     Getting at least 3 servings of Calcium per day:  Yes    Bi-annual eye exam:  Yes    Dental care twice a year:  Yes    Sleep apnea or symptoms of sleep apnea:  Sleep apnea    Diet:  Regular (no restrictions)    Frequency of exercise:  2-3 days/week    Duration of exercise:  45-60 minutes    Taking medications regularly:  Yes    Medication side effects:  None    Additional concerns today:  Yes      Today's PHQ-9 Score:       12/27/2023     6:56 AM   PHQ-9 SCORE   PHQ-9 Total Score MyChart 3 (Minimal depression)   PHQ-9 Total Score 3       Chief Complaint   Patient presents with    Physical     Pap and physical exam.    Blood Draw     Patient is fasting today for labs.    Imm/Inj     Due to update her tetanus injection.  Declined Covid.  Discuss if she is due for a Prevnar 20.         Have you ever done Advance Care Planning? (For example, a Health Directive, POLST, or a discussion with a medical provider or your loved ones " about your wishes): No, advance care planning information given to patient to review.  Patient declined advance care planning discussion at this time.    Social History     Tobacco Use    Smoking status: Former     Types: Cigarettes     Start date: 1998     Quit date: 2001     Years since quittin.5    Smokeless tobacco: Never    Tobacco comments:     not a smoker   Substance Use Topics    Alcohol use: Yes     Comment: rare             2023     4:11 PM   Alcohol Use   Prescreen: >3 drinks/day or >7 drinks/week? No     Reviewed orders with patient.  Reviewed health maintenance and updated orders accordingly - Yes  Lab work is in process  Labs reviewed in EPIC  BP Readings from Last 3 Encounters:   23 122/86   10/24/23 135/82   23 120/88    Wt Readings from Last 3 Encounters:   23 127.5 kg (281 lb)   10/24/23 129.3 kg (285 lb)   23 128.8 kg (284 lb)                  Patient Active Problem List   Diagnosis    Contraceptive management    Hyperhidrosis of axilla    CARDIOVASCULAR SCREENING; LDL GOAL LESS THAN 160    Generalized anxiety disorder    Moderate recurrent major depression (H)    Chronic idiopathic urticaria    Hyperlipidemia with target LDL less than 130    Hypothyroidism, unspecified hypothyroidism type    Rosacea    Non morbid obesity due to excess calories    BRIDGETTE (obstructive sleep apnea)    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 factor (H)    High risk medication use    Obesity (BMI 35.0-39.9) with comorbidity (H)    Hemorrhagic condition, unspecified (H24)     Past Surgical History:   Procedure Laterality Date    CHOLECYSTECTOMY, LAPOROSCOPIC  2006    Cholecystectomy, Laparoscopic    SURGICAL HISTORY OF -   17 years old    Thyroid ablation    ZZC APPENDECTOMY         Social History     Tobacco Use    Smoking status: Former     Types: Cigarettes     Start date: 1998     Quit date: 2001     Years since quittin.5    Smokeless  tobacco: Never    Tobacco comments:     not a smoker   Substance Use Topics    Alcohol use: Yes     Comment: rare     Family History   Problem Relation Age of Onset    Diabetes Mother     Hypertension Mother     Lipids Mother     Hyperlipidemia Mother     Hypertension Father     Lipids Father     Thyroid Disease Father     Cancer Father     Hyperlipidemia Father     Other Cancer Father         Gastric & Bone Cancer    Diabetes Maternal Grandmother     Diabetes Paternal Grandmother     Melanoma No family hx of          Current Outpatient Medications   Medication Sig Dispense Refill    adalimumab (HUMIRA *CF*) 40 MG/0.4ML pen kit Inject 0.4 mLs (40 mg) Subcutaneous every 14 days . Hold for signs of infection, then seek medical attention. 0.8 mL 4    buPROPion (WELLBUTRIN XL) 150 MG 24 hr tablet Take 3 tablets by mouth once daily 270 tablet 3    busPIRone (BUSPAR) 15 MG tablet Take 1 tablet (15mg) by mouth twice daily. 180 tablet 3    cephALEXin (KEFLEX) 500 MG capsule Take 1 capsule (500 mg) by mouth 3 times daily for 10 days 30 capsule 0    cetirizine (ZYRTEC) 10 MG tablet Take 20 mg by mouth daily.      diclofenac (VOLTAREN) 1 % GEL topical gel Apply 2 grams to hands four times daily using enclosed dosing card. 100 g 3    folic acid (FOLVITE) 1 MG tablet Take 1 tablet (1 mg) by mouth daily 90 tablet 2    hydroxychloroquine (PLAQUENIL) 200 MG tablet Take 2 tablets (400 mg) by mouth daily 180 tablet 2    levonorgestrel-ethinyl estradiol (SEASONALE) 0.15-0.03 MG tablet Take 1 tablet by mouth daily 91 tablet 3    levothyroxine (SYNTHROID/LEVOTHROID) 175 MCG tablet Take 1 tablet (175 mcg) by mouth daily 90 tablet 3    methotrexate sodium 2.5 MG TABS Take 6 tablets (15 mg) by mouth once a week . Take all 6 tablets on the same day of each week. 78 tablet 2    Multiple Vitamin (MULTIVITAMIN) per tablet Take 1 tablet by mouth daily. 100 tablet 12    order for DME Equipment being ordered: CPAP 6-10 cm      sulfaSALAzine ER  (AZULFIDINE EN) 500 MG EC tablet Take 2 tablets (1,000 mg) by mouth 2 times daily 360 tablet 1    traZODone (DESYREL) 50 MG tablet Take 1 to 2 tablets by mouth nightly at bedtime. Provider visit due for further refills 180 tablet 3    ALPRAZolam (XANAX) 0.5 MG tablet Take 1 tablet (0.5 mg) by mouth 3 times daily as needed for anxiety (Patient not taking: Reported on 12/27/2023) 20 tablet 0     Allergies   Allergen Reactions    Macrobid [Nitrofuran Derivatives]     Paxil [Paroxetine]      Anxiety        Zoloft      Mood changes       Breast Cancer Screening:    FHS-7:       1/22/2022    10:08 AM 1/22/2022    10:17 AM 4/15/2023    10:27 AM   Breast CA Risk Assessment (FHS-7)   Did any of your first-degree relatives have breast or ovarian cancer? No No No   Did any of your relatives have bilateral breast cancer?  No No   Did any man in your family have breast cancer?  No No   Did any woman in your family have breast and ovarian cancer?  No No   Did any woman in your family have breast cancer before age 50 y?  No No   Do you have 2 or more relatives with breast and/or ovarian cancer?  No No   Do you have 2 or more relatives with breast and/or bowel cancer?  No No     click delete button to remove this line now  Mammogram Screening - Offered annual screening and updated Health Maintenance for mutual plan based on risk factor consideration  Pertinent mammograms are reviewed under the imaging tab.    History of abnormal Pap smear: NO - age 30-65 PAP every 5 years with negative HPV co-testing recommended      Latest Ref Rng & Units 11/16/2020     2:54 PM 8/30/2017     3:29 PM 6/19/2014    12:00 AM   PAP / HPV   PAP (Historical)  NIL  NIL  NIL    HPV 16 DNA NEG^Negative  Negative     HPV 18 DNA NEG^Negative  Negative     Other HR HPV NEG^Negative  Negative       Reviewed and updated as needed this visit by clinical staff   Tobacco  Allergies  Meds              Reviewed and updated as needed this visit by Provider           "       Past Medical History:   Diagnosis Date    Arthritis 01/01/2013    Depressive disorder 01/01/2003    Peripheral autonomic neuropathy in disorders classified elsewhere(337.1)     Pure hypercholesterolemia     Thyroid disease 01/01/1995      Past Surgical History:   Procedure Laterality Date    CHOLECYSTECTOMY, LAPOROSCOPIC  2006    Cholecystectomy, Laparoscopic    SURGICAL HISTORY OF -   17 years old    Thyroid ablation    ZZC APPENDECTOMY  2002       Review of Systems   Constitutional:  Negative for chills and fever.   HENT:  Negative for congestion, ear pain, hearing loss and sore throat.    Eyes:  Negative for pain and visual disturbance.   Respiratory:  Negative for cough and shortness of breath.    Cardiovascular:  Negative for chest pain, palpitations and peripheral edema.   Gastrointestinal:  Negative for abdominal pain, constipation, diarrhea, heartburn, hematochezia and nausea.   Breasts:  Negative for tenderness, breast mass and discharge.   Genitourinary:  Negative for dysuria, frequency, genital sores, hematuria, pelvic pain, urgency, vaginal bleeding and vaginal discharge.   Musculoskeletal:  Negative for arthralgias, joint swelling and myalgias.   Skin:  Negative for rash.   Neurological:  Negative for dizziness, weakness, headaches and paresthesias.   Psychiatric/Behavioral:  Negative for mood changes. The patient is not nervous/anxious.           OBJECTIVE:   /86 (BP Location: Right arm, Patient Position: Chair, Cuff Size: Adult Large)   Pulse 87   Temp 98  F (36.7  C) (Tympanic)   Resp 18   Ht 1.778 m (5' 10\")   Wt 127.5 kg (281 lb)   LMP 11/27/2023 (Approximate)   SpO2 98%   BMI 40.32 kg/m    Physical Exam  GENERAL: healthy, alert and no distress  EYES: Eyes grossly normal to inspection, PERRL and conjunctivae and sclerae normal  HENT: ear canals and TM's normal, nose and mouth without ulcers or lesions  NECK: no adenopathy, no asymmetry, masses, or scars and thyroid normal to " palpation  RESP: lungs clear to auscultation - no rales, rhonchi or wheezes  BREAST: normal without masses, tenderness or nipple discharge and no palpable axillary masses or adenopathy  CV: regular rate and rhythm, normal S1 S2, no S3 or S4, no murmur, click or rub, no peripheral edema and peripheral pulses strong  ABDOMEN: soft, nontender, no hepatosplenomegaly, no masses and bowel sounds normal   (female): normal female external genitalia, normal urethral meatus, vaginal mucosa pink, moist, well rugated, and normal cervix/adnexa/uterus without masses or discharge  MS: no gross musculoskeletal defects noted, no edema  SKIN: no suspicious lesions or rashes  NEURO: Normal strength and tone, mentation intact and speech normal  PSYCH: mentation appears normal, affect normal/bright    Diagnostic Test Results:  Labs pending     ASSESSMENT/PLAN:   Vida was seen today for physical, blood draw and imm/inj.    Diagnoses and all orders for this visit:    Encounter for routine adult health examination without abnormal findings       43 yr old female here for her annual physical. Concerns were addressed.        Labs ordered ,patient will be notified of results.     Cervical cancer screening  -     Pap Screen with HPV - recommended age 30 - 65 years        -     Patient will be notified of results    Hyperlipidemia with target LDL less than 130  -     Lipid panel reflex to direct LDL Fasting  -     OFFICE/OUTPT VISIT,EST,LEVL IV  -     Patient will be notified of results.     Screening for diabetes mellitus  -     Glucose  -     OFFICE/OUTPT VISIT,EST,LEVL IV  -      Patient mentioned family history of diabetes.     Postoperative hypothyroidism  -     TSH with free T4 reflex  -     OFFICE/OUTPT VISIT,EST,LEVL IV    Cellulitis, unspecified cellulitis site  -     cephALEXin (KEFLEX) 500 MG capsule; Take 1 capsule (500 mg) by mouth 3 times daily for 10 days  -     OFFICE/OUTPT VISIT,EST,LEVL IV  -     Area looks infected.  "    Decreased libido  -     Ob/Gyn  Referral; Future  -     OFFICE/OUTPT VISIT,EST,LEVL IV    Other orders  -     TDAP 10-64Y (ADACEL,BOOSTRIX)        Patient has been advised of split billing requirements and indicates understanding: Yes      COUNSELING:  Reviewed preventive health counseling, as reflected in patient instructions       Regular exercise       Healthy diet/nutrition       Vision screening      BMI:   Estimated body mass index is 40.32 kg/m  as calculated from the following:    Height as of this encounter: 1.778 m (5' 10\").    Weight as of this encounter: 127.5 kg (281 lb).         She reports that she quit smoking about 22 years ago. Her smoking use included cigarettes. She started smoking about 25 years ago. She has never used smokeless tobacco.          Martina Jimenez MD  [facility] [place]  Answers submitted by the patient for this visit:  Patient Health Questionnaire (Submitted on 12/27/2023)  If you checked off any problems, how difficult have these problems made it for you to do your work, take care of things at home, or get along with other people?: Somewhat difficult  PHQ9 TOTAL SCORE: 3  JANET-7 (Submitted on 12/27/2023)  JANET 7 TOTAL SCORE: 2    "

## 2023-12-28 NOTE — RESULT ENCOUNTER NOTE
Please inform patient that test result was within normal parameters except increase in triglycerides.  Thank you.     Martina Jimenez M.D.

## 2023-12-29 LAB
BKR LAB AP GYN ADEQUACY: NORMAL
BKR LAB AP GYN INTERPRETATION: NORMAL
BKR LAB AP HPV REFLEX: NORMAL
BKR LAB AP LMP: NORMAL
BKR LAB AP PREVIOUS ABNORMAL: NORMAL
PATH REPORT.COMMENTS IMP SPEC: NORMAL
PATH REPORT.COMMENTS IMP SPEC: NORMAL
PATH REPORT.RELEVANT HX SPEC: NORMAL

## 2024-01-02 PROBLEM — Z12.4 SCREENING FOR CERVICAL CANCER: Status: ACTIVE | Noted: 2024-01-02

## 2024-01-02 LAB
HUMAN PAPILLOMA VIRUS 16 DNA: NEGATIVE
HUMAN PAPILLOMA VIRUS 18 DNA: NEGATIVE
HUMAN PAPILLOMA VIRUS FINAL DIAGNOSIS: NORMAL
HUMAN PAPILLOMA VIRUS OTHER HR: NEGATIVE

## 2024-01-05 DIAGNOSIS — M06.09 RHEUMATOID ARTHRITIS OF MULTIPLE SITES WITH NEGATIVE RHEUMATOID FACTOR (H): ICD-10-CM

## 2024-01-05 NOTE — TELEPHONE ENCOUNTER
Sulfasalazine 500mg tab      Last Written Prescription Date:  07-21-23  Last Fill Quantity: 360,   # refills: 1  Last Office Visit: 10-24-23  Future Office visit:    Next 5 appointments (look out 90 days)      Jan 23, 2024  4:15 PM  Lab visit with [place] [facility]  [facility] ([facility] ) [address] [facility] [address]  [phone]     Jan 30, 2024  8:40 AM  (Arrive by 8:25 AM)  Return Visit with Andrea Benítez MD  [facility] [place] ([facility] [place] ) [address] [place] [address]  [place] [id number] [phone]             Routing refill request to provider for review/approval because:  Medication is reported/historical  Pending Prescriptions:                       Disp   Refills    sulfaSALAzine ER (AZULFIDINE EN) 500 MG E*360 ta*1            Sig: Take 2 tablets (1,000 mg) by mouth 2 times daily    April St Lakshmi CMA 1/5/2024 9:40 AM

## 2024-01-05 NOTE — TELEPHONE ENCOUNTER
Requested Prescriptions   Pending Prescriptions Disp Refills    sulfaSALAzine ER (AZULFIDINE EN) 500 MG EC tablet 360 tablet 1     Sig: Take 2 tablets (1,000 mg) by mouth 2 times daily       There is no refill protocol information for this order        Routing refill request to provider for review/approval because:  Drug not on the [facility] refill protocol   LOV 10/24/23  Next 1/30/24  Labs 10/24/23    Mimi COLES, Specialty RN 1/5/2024 12:39 PM

## 2024-01-10 RX ORDER — SULFASALAZINE 500 MG/1
1000 TABLET, DELAYED RELEASE ORAL 2 TIMES DAILY
Qty: 360 TABLET | Refills: 2 | Status: SHIPPED | OUTPATIENT
Start: 2024-01-10 | End: 2024-08-29

## 2024-01-23 ENCOUNTER — LAB (OUTPATIENT)
Dept: LAB | Facility: CLINIC | Age: 44
End: 2024-01-23
Payer: COMMERCIAL

## 2024-01-23 DIAGNOSIS — M06.09 RHEUMATOID ARTHRITIS OF MULTIPLE SITES WITH NEGATIVE RHEUMATOID FACTOR (H): ICD-10-CM

## 2024-01-23 DIAGNOSIS — Z11.4 SCREENING FOR HIV (HUMAN IMMUNODEFICIENCY VIRUS): ICD-10-CM

## 2024-01-23 DIAGNOSIS — Z79.899 HIGH RISK MEDICATION USE: ICD-10-CM

## 2024-01-23 LAB
ALBUMIN SERPL BCG-MCNC: 4.2 G/DL (ref 3.5–5.2)
ALP SERPL-CCNC: 43 U/L (ref 40–150)
ALT SERPL W P-5'-P-CCNC: 18 U/L (ref 0–50)
AST SERPL W P-5'-P-CCNC: 23 U/L (ref 0–45)
BASOPHILS # BLD AUTO: 0.1 10E3/UL (ref 0–0.2)
BASOPHILS NFR BLD AUTO: 1 %
BILIRUB DIRECT SERPL-MCNC: <0.2 MG/DL (ref 0–0.3)
BILIRUB SERPL-MCNC: 0.4 MG/DL
CREAT SERPL-MCNC: 0.95 MG/DL (ref 0.51–0.95)
CRP SERPL-MCNC: 5.6 MG/L
EGFRCR SERPLBLD CKD-EPI 2021: 76 ML/MIN/1.73M2
EOSINOPHIL # BLD AUTO: 0.2 10E3/UL (ref 0–0.7)
EOSINOPHIL NFR BLD AUTO: 3 %
ERYTHROCYTE [DISTWIDTH] IN BLOOD BY AUTOMATED COUNT: 14 % (ref 10–15)
ERYTHROCYTE [SEDIMENTATION RATE] IN BLOOD BY WESTERGREN METHOD: 7 MM/HR (ref 0–20)
HCT VFR BLD AUTO: 39.2 % (ref 35–47)
HGB BLD-MCNC: 13.1 G/DL (ref 11.7–15.7)
IMM GRANULOCYTES # BLD: 0 10E3/UL
IMM GRANULOCYTES NFR BLD: 0 %
LYMPHOCYTES # BLD AUTO: 3.5 10E3/UL (ref 0.8–5.3)
LYMPHOCYTES NFR BLD AUTO: 50 %
MCH RBC QN AUTO: 31.5 PG (ref 26.5–33)
MCHC RBC AUTO-ENTMCNC: 33.4 G/DL (ref 31.5–36.5)
MCV RBC AUTO: 94 FL (ref 78–100)
MONOCYTES # BLD AUTO: 0.8 10E3/UL (ref 0–1.3)
MONOCYTES NFR BLD AUTO: 11 %
NEUTROPHILS # BLD AUTO: 2.5 10E3/UL (ref 1.6–8.3)
NEUTROPHILS NFR BLD AUTO: 36 %
PLATELET # BLD AUTO: 173 10E3/UL (ref 150–450)
PROT SERPL-MCNC: 7.2 G/DL (ref 6.4–8.3)
RBC # BLD AUTO: 4.16 10E6/UL (ref 3.8–5.2)
WBC # BLD AUTO: 7.1 10E3/UL (ref 4–11)

## 2024-01-23 PROCEDURE — 87389 HIV-1 AG W/HIV-1&-2 AB AG IA: CPT

## 2024-01-23 PROCEDURE — 85652 RBC SED RATE AUTOMATED: CPT

## 2024-01-23 PROCEDURE — 80076 HEPATIC FUNCTION PANEL: CPT

## 2024-01-23 PROCEDURE — 36415 COLL VENOUS BLD VENIPUNCTURE: CPT

## 2024-01-23 PROCEDURE — 86140 C-REACTIVE PROTEIN: CPT

## 2024-01-23 PROCEDURE — 85025 COMPLETE CBC W/AUTO DIFF WBC: CPT

## 2024-01-23 PROCEDURE — 82565 ASSAY OF CREATININE: CPT

## 2024-01-24 LAB — HIV 1+2 AB+HIV1 P24 AG SERPL QL IA: NONREACTIVE

## 2024-01-30 ENCOUNTER — OFFICE VISIT (OUTPATIENT)
Dept: OBGYN | Facility: CLINIC | Age: 44
End: 2024-01-30
Payer: COMMERCIAL

## 2024-01-30 ENCOUNTER — OFFICE VISIT (OUTPATIENT)
Dept: RHEUMATOLOGY | Facility: CLINIC | Age: 44
End: 2024-01-30
Payer: COMMERCIAL

## 2024-01-30 VITALS
DIASTOLIC BLOOD PRESSURE: 84 MMHG | HEART RATE: 96 BPM | SYSTOLIC BLOOD PRESSURE: 137 MMHG | WEIGHT: 281 LBS | TEMPERATURE: 98.3 F | RESPIRATION RATE: 18 BRPM | HEIGHT: 70 IN | BODY MASS INDEX: 40.23 KG/M2

## 2024-01-30 VITALS
HEART RATE: 97 BPM | BODY MASS INDEX: 40.18 KG/M2 | DIASTOLIC BLOOD PRESSURE: 80 MMHG | SYSTOLIC BLOOD PRESSURE: 113 MMHG | WEIGHT: 280 LBS | OXYGEN SATURATION: 98 %

## 2024-01-30 DIAGNOSIS — Z86.59 HISTORY OF DEPRESSION: Primary | ICD-10-CM

## 2024-01-30 DIAGNOSIS — F33.1 MODERATE RECURRENT MAJOR DEPRESSION (H): ICD-10-CM

## 2024-01-30 DIAGNOSIS — E03.9 HYPOTHYROIDISM, UNSPECIFIED TYPE: ICD-10-CM

## 2024-01-30 DIAGNOSIS — M06.09 RHEUMATOID ARTHRITIS OF MULTIPLE SITES WITH NEGATIVE RHEUMATOID FACTOR (H): Primary | ICD-10-CM

## 2024-01-30 DIAGNOSIS — R68.82 DECREASED LIBIDO: ICD-10-CM

## 2024-01-30 DIAGNOSIS — Z79.899 HIGH RISK MEDICATION USE: ICD-10-CM

## 2024-01-30 PROCEDURE — 99214 OFFICE O/P EST MOD 30 MIN: CPT | Performed by: INTERNAL MEDICINE

## 2024-01-30 PROCEDURE — 99203 OFFICE O/P NEW LOW 30 MIN: CPT | Performed by: ADVANCED PRACTICE MIDWIFE

## 2024-01-30 PROCEDURE — 36415 COLL VENOUS BLD VENIPUNCTURE: CPT | Performed by: ADVANCED PRACTICE MIDWIFE

## 2024-01-30 PROCEDURE — G2211 COMPLEX E/M VISIT ADD ON: HCPCS | Performed by: INTERNAL MEDICINE

## 2024-01-30 PROCEDURE — 82306 VITAMIN D 25 HYDROXY: CPT | Performed by: ADVANCED PRACTICE MIDWIFE

## 2024-01-30 PROCEDURE — 84403 ASSAY OF TOTAL TESTOSTERONE: CPT | Performed by: ADVANCED PRACTICE MIDWIFE

## 2024-01-30 RX ORDER — FOLIC ACID 1 MG/1
1 TABLET ORAL DAILY
Qty: 90 TABLET | Refills: 2 | Status: SHIPPED | OUTPATIENT
Start: 2024-01-30 | End: 2024-08-29

## 2024-01-30 RX ORDER — HYDROXYCHLOROQUINE SULFATE 200 MG/1
400 TABLET, FILM COATED ORAL DAILY
Qty: 180 TABLET | Refills: 2 | Status: SHIPPED | OUTPATIENT
Start: 2024-01-30 | End: 2024-05-20

## 2024-01-30 RX ORDER — METHOTREXATE 2.5 MG/1
15 TABLET ORAL WEEKLY
Qty: 78 TABLET | Refills: 2 | Status: SHIPPED | OUTPATIENT
Start: 2024-01-30 | End: 2024-08-29

## 2024-01-30 NOTE — PROGRESS NOTES
Rheumatology Clinic Visit      Vida Whitfield MRN# [id number]   YOB: 1980 Age: 43 year old      Date of visit: 1/30/24   PCP: Dr. Fco Medeiros  Ophthalmology: [facility] in [place], [place]    Chief Complaint   Patient presents with:  Arthritis    Assessment and Plan     1. Seronegative nonerosive rheumatoid arthritis: Currently on methotrexate 15 mg once weekly (Cr elevation possibly secondary to MTX so MTX was reduced with improvement of Cr), folic acid 1 mg daily, sulfasalazine 1000 mg twice daily, hydroxychloroquine 400 mg daily, and Humira (started 10/26/2023, effective).  Mild right still with mild Achilles enthesitis.  Arthritis otherwise significantly improved.  Discussed continuing Humira for at least 6 months before trying to taper other conventional DMARDs, if she is doing well at that time and she is in agreement.   Chronic illness, stable.    - Continue methotrexate 15mg once weekly  - Continue folic acid 1 mg daily  - Continue hydroxychloroquine 400mg daily (last eye exam performed at [facility] on 7/18/2022; yearly eye exam required and advised that she call to schedule)  - Continue sulfasalazine 1000 mg twice daily  - Continue Humira 40 mg SQ every 14 days   - Avoid oral NSAIDs because they are historically triggers for urticaria and because of creatinine elevation; tolerates voltaren gel  - Labs in 3 months: CBC, Creatinine, Hepatic Panel, ESR, CRP    High risk medication requiring intensive toxicity monitoring at least quarterly.     # On birth control per patient    2.  History of bilateral patellofemoral syndrome: Previous steroid injections have been effective; repeat injection not needed at this time.  Asymptomatic at this time    3. Urticaria: Historically, oral NSAIDs are triggers.  Documented here for clinical significance only.  Not managed in this clinic.    4. Bone health: 9/20/2016 Vitamin D level normal.     5.  Right Achilles enthesitis: has followed with  podiatry.  Improved with prednisone and a walking boot.  Suspect this is related to the inflammatory arthritis and adjusting treatment as noted in #1    6.  Vaccinations: Vaccinations reviewed with Ms. Whitfield.     - Influenza: up to date per patient  - [id number]: up to date  - [id number]: up to date  - COVID-19: Up to date     Total minutes spent in evaluation with patient, documentation, , and review of pertinent studies and chart notes: 20  The longitudinal plan of care for the rheumatology problem(s) were addressed during this visit.  Due to added complexity of care, we will continue to support the patient and the subsequent management of this condition with ongoing continuity of care.        Ms. Whitfield verbalized agreement with and understanding of the rational for the diagnosis and treatment plan.  All questions were answered to best of my ability and the patient's satisfaction. Ms. Whitfield was advised to contact the clinic with any questions that may arise after the clinic visit.      Thank you for involving me in the care of the patient    Return to clinic: 3 months    HPI   Vida Whitfield is a 43 year old female with a medical history significant for anxiety, depression, chronic idiopathic urticaria, hyperlipidemia, hypothyroidism, rosacea, obstructive sleep apnea, and inflammatory arthritis who presents for follow-up of rheumatoid arthritis.    Historical records in this paragraph: She was previously evaluated by Dr. Blaine Anthony at [facility].  2014 labs show negative: hepatitis C ab, HBV surface ag, HBV core ab, HLA-B27, Sm, RNP, SSA, SSB, Scl-70, DNA, cardiolipin, CCP, PR3, MPO. NIKKIE 1:160 homogeneous.  MPO also positive (one negative and one positive value). Per a 3/11/2016 clinic note, she has seronegative rheumatoid arthritis and chronic urticaria. She developed joint swelling and stiffness in the bilateral MCPs, MTPs, ankles, and knees that started in November 2013 shortly after tapering  off prednisone that was used to manage chronic urticaria. No history of psoriasis or inflammatory bowel disease. Negative SI joint x-rays. Negative MRI of the SI joint. Flaring of her urticaria with NSAIDs. Steroid responsive in regard to her joints. Near resolution of joint stiffness and pain with methotrexate. NSAIDs are avoided because they cause flares of her urticaria.    1/23/2023: In the last 1 week she has had more pain at the PIPs > MCPs > left ankle.  No swelling of these joints.  Morning stiffness for about 1 hour.  Pain and stiffness is worse in the morning and improves with time and activity.  She attributes the increased joint pain to the weather.    4/21/2023: The prednisone course given in January 2023 was effective and resolved the inflammatory arthritis symptoms at the ankles, PIPs, and MCPs.  She then did well for a while but for the past 2 weeks has had right Achilles enthesitis pain that was evaluated by podiatry and she was placed on prednisone with improvement of the Achilles enthesitis, but no other improvement of her joints because they were already doing well.  She is also going to use a walking boot from podiatry.    7/21/2023: increased pain and stiffness of the knees in the morning that improves with walking around.  Knees bothered with going up and down stairs.  Typically sleeps with knees bent.  Knees without swelling, increased warmth, or overlying erythema.  Knees more symptomatic for about 1.5 months.  Ankles stiff in the morning; duration of about 1 hour, similar to the knees but resolves quicker; duration of 1.5 months.  Heel pain on the right; improved but not resolved; walking boot with some improvement.    10/24/2023: Pain of the right second and third MCPs, left wrist, and both shoulders; pain is worse in the morning and improves with time and activity.  Morning stiffness for 2 hours; positive gelling phenomenon.  Still with pain at the right Achilles enthesis that is better  with wearing supportive shoes that have adequate padding     Today, 1/30/2024: Significant improvement with addition of Humira.  No injection site issues.  Right Achilles enthesitis still mildly active, but possibly improved.  Other joint symptoms significantly improved.  Morning stiffness for about 30-60 minutes.  No joint swelling.  Arthritis does not limit daily activities.    No fevers or chills.  No nausea or vomiting.  No blood in her stool.  No oral or nasal sores.  No chest pain/pressure, palpitations, or shortness of breath.  No cough.  No eye pain or redness.    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    ROS   12 point review of system was completed and negative except as noted in the HPI     Active Problem List     Patient Active Problem List   Diagnosis    Contraceptive management    Hyperhidrosis of axilla    CARDIOVASCULAR SCREENING; LDL GOAL LESS THAN 160    Generalized anxiety disorder    Moderate recurrent major depression (H)    Chronic idiopathic urticaria    Hyperlipidemia with target LDL less than 130    Hypothyroidism, unspecified hypothyroidism type    Rosacea    Non morbid obesity due to excess calories    BRIDGETTE (obstructive sleep apnea)    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 factor (H)    High risk medication use    Obesity (BMI 35.0-39.9) with comorbidity (H)    Hemorrhagic condition, unspecified (H24)    Screening for cervical cancer     Past Medical History     Past Medical History:   Diagnosis Date    Arthritis 01/01/2013    Depressive disorder 01/01/2003    Peripheral autonomic neuropathy in disorders classified elsewhere(337.1)     Pure hypercholesterolemia     Thyroid disease 01/01/1995     Past Surgical History     Past Surgical History:   Procedure Laterality Date    CHOLECYSTECTOMY, LAPOROSCOPIC  2006    Cholecystectomy,  Laparoscopic    SURGICAL HISTORY OF -   17 years old    Thyroid ablation    Z APPENDECTOMY  2002     Allergy     Allergies   Allergen Reactions    Macrobid [Nitrofuran Derivatives]     Paxil [Paroxetine]      Anxiety        Zoloft      Mood changes     Current Medication List     Current Outpatient Medications   Medication Sig    adalimumab (HUMIRA *CF*) 40 MG/0.4ML pen kit Inject 0.4 mLs (40 mg) Subcutaneous every 14 days . Hold for signs of infection, then seek medical attention.    ALPRAZolam (XANAX) 0.5 MG tablet Take 1 tablet (0.5 mg) by mouth 3 times daily as needed for anxiety (Patient not taking: Reported on 12/27/2023)    buPROPion (WELLBUTRIN XL) 150 MG 24 hr tablet Take 3 tablets by mouth once daily    busPIRone (BUSPAR) 15 MG tablet Take 1 tablet (15mg) by mouth twice daily.    cetirizine (ZYRTEC) 10 MG tablet Take 20 mg by mouth daily.    diclofenac (VOLTAREN) 1 % GEL topical gel Apply 2 grams to hands four times daily using enclosed dosing card.    folic acid (FOLVITE) 1 MG tablet Take 1 tablet (1 mg) by mouth daily    hydroxychloroquine (PLAQUENIL) 200 MG tablet Take 2 tablets (400 mg) by mouth daily    levonorgestrel-ethinyl estradiol (SEASONALE) 0.15-0.03 MG tablet Take 1 tablet by mouth daily    levothyroxine (SYNTHROID/LEVOTHROID) 175 MCG tablet Take 1 tablet (175 mcg) by mouth daily    methotrexate sodium 2.5 MG TABS Take 6 tablets (15 mg) by mouth once a week . Take all 6 tablets on the same day of each week.    Multiple Vitamin (MULTIVITAMIN) per tablet Take 1 tablet by mouth daily.    order for DME Equipment being ordered: CPAP 6-10 cm    sulfaSALAzine ER (AZULFIDINE EN) 500 MG EC tablet Take 2 tablets (1,000 mg) by mouth 2 times daily    traZODone (DESYREL) 50 MG tablet Take 1 to 2 tablets by mouth nightly at bedtime. Provider visit due for further refills     No current facility-administered medications for this visit.         Social History   See HPI    Family History     Family History  "  Problem Relation Age of Onset    Diabetes Mother     Hypertension Mother     Lipids Mother     Hyperlipidemia Mother     Hypertension Father     Lipids Father     Thyroid Disease Father     Cancer Father     Hyperlipidemia Father     Other Cancer Father         Gastric & Bone Cancer    Diabetes Maternal Grandmother     Diabetes Paternal Grandmother     Melanoma No family hx of        Physical Exam     Temp Readings from Last 3 Encounters:   12/27/23 98  F (36.7  C) (Tympanic)   04/21/23 98.6  F (37  C) (Tympanic)   11/15/21 98.1  F (36.7  C) (Tympanic)     BP Readings from Last 5 Encounters:   01/30/24 113/80   12/27/23 122/86   10/24/23 135/82   04/21/23 120/88   11/15/21 128/84     Pulse Readings from Last 1 Encounters:   01/30/24 97     Resp Readings from Last 1 Encounters:   12/27/23 18     Estimated body mass index is 40.18 kg/m  as calculated from the following:    Height as of 12/27/23: 1.778 m (5' 10\").    Weight as of this encounter: 127 kg (280 lb).      GEN: NAD  HEENT:  Anicteric, noninjected sclera. No obvious external lesions of the ear and nose. Hearing intact.  CV: S1, S2. RRR. No m/r/g  PULM: No increased work of breathing. CTA bilaterally   MSK: MCPs, PIPs, DIPs without swelling or tenderness to palpation.  Wrists without swelling or tenderness to palpation.  Elbows and shoulders without swelling or tenderness to palpation.  Shoulders with normal range of motion.  Knees, ankles, and MTPs without swelling or tenderness to palpation.  Mildly tender to palpation at the right Achilles enthesis, but no swelling, increased warmth, or overlying erythema  SKIN: No rash or jaundice seen  PSYCH: Alert. Appropriate.      Labs / Imaging (select studies)     CBC  Recent Labs   Lab Test 01/23/24  1602 10/15/23  1135 09/17/23  1126 10/24/21  1057 06/24/21  0721 12/16/20  1826 11/16/20  1514 10/15/20  1434   WBC 7.1 6.4 6.6   < > 6.8 6.6   < > 7.0   RBC 4.16 4.08 4.13   < > 4.17 4.05   < > 3.84   HGB 13.1 13.1 " 13.1   < > 13.1 13.0   < > 12.2   HCT 39.2 37.8 37.6   < > 38.1 38.3   < > 35.8   MCV 94 93 91   < > 91 95   < > 93   RDW 14.0 14.1 14.3   < > 13.1 12.9   < > 12.3    156 170   < > 182 175   < > 170   MCH 31.5 32.1 31.7   < > 31.4 32.1   < > 31.8   MCHC 33.4 34.7 34.8   < > 34.4 33.9   < > 34.1   NEUTROPHIL 36 42 41   < > 49.0 45.7  --  44.7   LYMPH 50 48 48   < > 40.5 43.1  --  44.8   MONOCYTE 11 10 10   < > 8.3 10.4  --  10.0   EOSINOPHIL 3 0 2   < > 1.8 0.3  --  0.1   BASOPHIL 1 1 1   < > 0.4 0.5  --  0.4   ANEU  --   --   --   --  3.3 3.0  --  3.1   ALYM  --   --   --   --  2.7 2.8  --  3.1   FREDA  --   --   --   --  0.6 0.7  --  0.7   AEOS  --   --   --   --  0.1 0.0  --  0.0   ABAS  --   --   --   --  0.0 0.0  --  0.0   ANEUTAUTO 2.5 2.7 2.7   < >  --   --   --   --    ALYMPAUTO 3.5 3.1 3.2   < >  --   --   --   --    AMONOAUTO 0.8 0.6 0.6   < >  --   --   --   --    AEOSAUTO 0.2 0.0 0.1   < >  --   --   --   --    ABSBASO 0.1 0.0 0.0   < >  --   --   --   --     < > = values in this interval not displayed.     CMP  Recent Labs   Lab Test 01/23/24  1602 12/27/23  0739 10/24/23  0827 10/15/23  1135 09/17/23  1126 10/24/21  1057 06/24/21  0721 12/16/20  1826 10/15/20  1434 12/17/19  1640 10/19/19  1041 12/10/18  1900 09/23/18  1058 01/26/17  0745 12/20/16  1138   NA  --   --   --   --   --   --   --   --   --   --   --   --   --   --  138   POTASSIUM  --   --   --   --   --   --   --   --   --   --   --   --   --   --  4.4   CHLORIDE  --   --   --   --   --   --   --   --   --   --   --   --   --   --  104   CO2  --   --   --   --   --   --   --   --   --   --   --   --   --   --  30   ANIONGAP  --   --   --   --   --   --   --   --   --   --   --   --   --   --  4   GLC  --  88  --   --   --   --   --   --   --   --  81  --  79  --  84   BUN  --   --   --   --   --   --   --   --   --   --   --   --   --   --  13   CR 0.95  --  1.10* 1.22* 1.07*   < > 1.03 0.99 0.97   < >  --    < >  --    < > 1.05*    GFRESTIMATED 76  --  64 57* 66   < > 68 71 73   < >  --    < >  --    < > 59*   GFRESTBLACK  --   --   --   --   --   --  79 83 85   < >  --    < >  --    < > 72   TATI  --   --   --   --   --   --   --   --   --   --   --   --   --   --  8.7   BILITOTAL 0.4  --   --  0.5 0.5   < > 0.6 0.3 0.4   < >  --    < >  --    < >  --    ALBUMIN 4.2  --   --  4.0 4.0   < > 3.3* 3.8 3.5   < >  --    < >  --    < >  --    PROTTOTAL 7.2  --   --  6.7 6.6   < > 6.8 7.3 7.2   < >  --    < >  --    < >  --    ALKPHOS 43  --   --  39 39   < > 47 57 45   < >  --    < >  --    < >  --    AST 23  --   --  26 22   < > 17 16 19   < >  --    < >  --    < >  --    ALT 18  --   --  20 17   < > 21 23 22   < >  --    < >  --    < >  --     < > = values in this interval not displayed.     Calcium/VitaminD  Recent Labs   Lab Test 12/20/16  1138 09/20/16  1554   TATI 8.7  --    VITDT  --  36     ESR/CRP  Recent Labs   Lab Test 01/23/24  1602 10/15/23  1135 07/18/23  1527 01/19/23  1615 07/22/22  1528 01/25/22  1508 10/24/21  1057   SED 7 7 6   < > 8 7 7   CRP  --   --   --   --  7.8 8.6* 6.7   CRPI 5.60* 7.85* 10.10*   < >  --   --   --     < > = values in this interval not displayed.     Lipid Panel  Recent Labs   Lab Test 12/27/23  0739 10/19/19  1041 09/23/18  1058   CHOL 160 187 166   TRIG 160* 80 91   HDL 52 58 54   LDL 76 113* 94   NHDL 108 129 112     Hepatitis B  Recent Labs   Lab Test 07/18/23  1527   HBCAB Nonreactive   HEPBANG Nonreactive     Hepatitis C  Recent Labs   Lab Test 07/18/23  1527   HCVAB Nonreactive     Lyme ab screening  Recent Labs   Lab Test 10/24/23  0827   LYMEGM 0.11     Tuberculosis Screening  Recent Labs   Lab Test 10/24/23  0827   TBRES Negative     HIV Screening  Recent Labs   Lab Test 01/23/24  1602 10/24/23  0827   HIAGAB Nonreactive Nonreactive     --------------------------------------------------    From care everywhere:      Immunization History     Immunization History   Administered Date(s) Administered     COVID-19 Monovalent 18+ (Moderna) 08/18/2021, 09/15/2021    Flu, Unspecified 10/22/2013, 10/03/2014    W2o2-77 Novel Flu- Nasal 10/21/2009    HepB 07/22/1999    Hepatitis B, Adult 07/22/1999, 08/25/1999, 01/28/2000    Historical DTP/aP 1980, 1980, 1980, 11/02/1982    Influenza (IIV3) PF 11/05/1996, 10/19/2010, 10/31/2019    Influenza Vaccine >6 months,quad, PF 10/18/2018, 10/31/2019, 10/29/2020, 10/14/2021, 10/19/2022    Influenza, seasonal, injectable, PF 10/30/2006, 11/02/2007, 10/21/2008, 10/12/2009    MMR 09/07/1992, 08/05/1993    Mantoux Tuberculin Skin Test 02/03/2012, 04/04/2012, 09/30/2014, 10/07/2014    OPV, trivalent, live 1980, 1980, 1981, 11/02/1982    Pneumo Conj 13-V (2010&after) 09/20/2016    Pneumococcal 23 valent 12/20/2016    TD,PF 7+ (Tenivac) 07/14/1995, 01/17/2006    TDAP (Adacel,Boostrix) 12/10/2013, 12/27/2023            Chart documentation done in part with Dragon Voice recognition Software. Although reviewed after completion, some word and grammatical error may remain.      Andrea Benítez MD

## 2024-01-30 NOTE — NURSING NOTE
"Initial /84 (BP Location: Left arm, Patient Position: Chair, Cuff Size: Adult Large)   Pulse 96   Temp 98.3  F (36.8  C) (Tympanic)   Resp 18   Ht 1.778 m (5' 10\")   Wt 127.5 kg (281 lb)   LMP 12/18/2023 (Approximate)   BMI 40.32 kg/m   Estimated body mass index is 40.32 kg/m  as calculated from the following:    Height as of this encounter: 1.778 m (5' 10\").    Weight as of this encounter: 127.5 kg (281 lb). .    "

## 2024-01-30 NOTE — PROGRESS NOTES
"S:  Vida is here due to decreased libido.  She said that it has always been low, but now it is \"none\".  She feels like she has a good relationship with her partner.  He is supportive. They talk.  But it is bothering her.  She takes multiple meds and has not noticed any difference with any certain medication.  She has a coworker who works with testosterone pellets.  She is trying to make diet and exercise changes to be in better health.  She has been enjoying water josette.  O:  Appears well, concerned, friendly and open   A/P  (Z86.59) History of depression  (primary encounter diagnosis)  Plan: Vitamin D Deficiency  We talked about antidepressants effecting libido.  Discussed exercise and healthy diet to support healthy mood and libido.    Recommended vitamin D supplementation.  Optimal level is not clear - some say 20 and above.  Some say 50-80.      (R68.82) Decreased libido  Plan: Testosterone, total ordered per her request  We discussed hormone changes with age.  Discussed less libido with birth control pills   Discussed healthy relationship and communication.  She feels good about this.    Some clinics are using microdoses of testosterone off label for women.  It can be in compounded creams or pellets. She requested her level so that she can do further research.    Encouraged healthy diet and exercise.             "

## 2024-01-31 LAB — VIT D+METAB SERPL-MCNC: 17 NG/ML (ref 20–50)

## 2024-02-01 LAB — TESTOST SERPL-MCNC: 9 NG/DL (ref 8–60)

## 2024-02-01 RX ORDER — LEVOTHYROXINE SODIUM 175 UG/1
175 TABLET ORAL DAILY
Qty: 90 TABLET | Refills: 3 | Status: SHIPPED | OUTPATIENT
Start: 2024-02-01

## 2024-02-01 RX ORDER — BUSPIRONE HYDROCHLORIDE 15 MG/1
TABLET ORAL
Qty: 180 TABLET | Refills: 2 | Status: SHIPPED | OUTPATIENT
Start: 2024-02-01

## 2024-03-25 ENCOUNTER — TELEPHONE (OUTPATIENT)
Dept: RHEUMATOLOGY | Facility: CLINIC | Age: 44
End: 2024-03-25
Payer: COMMERCIAL

## 2024-03-25 NOTE — TELEPHONE ENCOUNTER
PA Initiation    Medication: HUMIRA *CF* PEN 40 MG/0.4ML SC PNKT  Insurance Company: MEDICA - Phone [phone] Fax [phone]:  1MEDICA  Pharmacy Filling the Rx: [facility] - [address] [facility]  Filling Pharmacy Phone: [phone]  Filling Pharmacy Fax: [phone]  Start Date: 3/25/2024  BETY (Key: [id number])  PA Case ID #: [id number]    [email or web address]

## 2024-03-25 NOTE — TELEPHONE ENCOUNTER
Prior Authorization Approval    Medication: HUMIRA *CF* PEN 40 MG/0.4ML SC PNKT  Authorization Effective Date: 2/24/2024  Authorization Expiration Date: 3/25/2025  Approved Dose/Quantity: 2 kit / 28 day  Reference #: [id number] (Key: [id number])   Insurance Company: MEDICA - Phone [phone] Fax [phone]:  1MEDICA  Expected CoPay: $ 5  CoPay Card Available: Other (see comments)    Financial Assistance Needed: [email or web address]  Which Pharmacy is filling the prescription: [address]  Pharmacy Notified: yes - ePA renewal  Patient Notified: renewal        Thank You,     Katherin Gallardo [facility]  Specialty Pharmacy [facility] Special[facility]  [email or web address]  Phone: [phone]  Fax: [phone]

## 2024-03-30 DIAGNOSIS — F33.1 MODERATE RECURRENT MAJOR DEPRESSION (H): ICD-10-CM

## 2024-04-01 RX ORDER — BUPROPION HYDROCHLORIDE 150 MG/1
TABLET ORAL
Qty: 270 TABLET | Refills: 0 | Status: SHIPPED | OUTPATIENT
Start: 2024-04-01 | End: 2024-06-24

## 2024-05-04 DIAGNOSIS — Z30.41 ENCOUNTER FOR SURVEILLANCE OF CONTRACEPTIVE PILLS: ICD-10-CM

## 2024-05-04 DIAGNOSIS — F33.1 MODERATE RECURRENT MAJOR DEPRESSION (H): ICD-10-CM

## 2024-05-06 RX ORDER — LEVONORGESTREL AND ETHINYL ESTRADIOL 0.15-0.03
1 KIT ORAL DAILY
Qty: 30 TABLET | Refills: 0 | Status: SHIPPED | OUTPATIENT
Start: 2024-05-06 | End: 2024-07-19

## 2024-05-06 RX ORDER — TRAZODONE HYDROCHLORIDE 50 MG/1
TABLET, FILM COATED ORAL
Qty: 90 TABLET | Refills: 0 | Status: SHIPPED | OUTPATIENT
Start: 2024-05-06

## 2024-05-06 NOTE — TELEPHONE ENCOUNTER
Requested Prescriptions   Pending Prescriptions Disp Refills    levonorgestrel-ethinyl estradiol (SEASONALE) 0.15-0.03 MG tablet [Pharmacy Med Name: Levonorgestrel/Ethinyl Estradiol 0.15mg-0.03mg Tablet] 91 tablet 2     Sig: Take 1 tablet by mouth daily.       Contraceptives Protocol Failed - 5/4/2024 10:29 PM        Failed - Medication indicated for associated diagnosis     Medication is associated with one or more of the following diagnoses:  Hyperthyroidism  Disorder of the thyroid gland  Thyrotoxicosis  Thyroid storm  Thyroid eye disease          Passed - Patient is not a current smoker if age is 35 or older        Passed - Recent (12 mo) or future (30 days) visit within the authorizing provider's specialty     The patient must have completed an in-person or virtual visit within the past 12 months or has a future visit scheduled within the next 90 days with the authorizing provider s specialty.  Urgent care and e-visits do not quality as an office visit for this protocol.          Passed - Medication is active on med list        Passed - No active pregnancy on record        Passed - No positive pregnancy test in past 12 months          traZODone (DESYREL) 50 MG tablet [Pharmacy Med Name: Trazodone Hydrochloride 50mg Tablet] 180 tablet 2     Sig: Take 1 to 2 tablets by mouth nightly at bedtime. **Provider visit due for further refills**       Serotonin Modulators Passed - 5/4/2024 10:29 PM        Passed - Recent (12 mo) or future (30 days) visit within the authorizing provider's specialty     The patient must have completed an in-person or virtual visit within the past 12 months or has a future visit scheduled within the next 90 days with the authorizing provider s specialty.  Urgent care and e-visits do not quality as an office visit for this protocol.          Passed - Medication is active on med list        Passed - Patient is age 18 or older        Passed - No active pregnancy on record        Passed - No  positive pregnancy test in past 12 months

## 2024-05-14 ENCOUNTER — LAB (OUTPATIENT)
Dept: LAB | Facility: CLINIC | Age: 44
End: 2024-05-14
Payer: COMMERCIAL

## 2024-05-14 DIAGNOSIS — M06.09 RHEUMATOID ARTHRITIS OF MULTIPLE SITES WITH NEGATIVE RHEUMATOID FACTOR (H): ICD-10-CM

## 2024-05-14 DIAGNOSIS — Z79.899 HIGH RISK MEDICATION USE: ICD-10-CM

## 2024-05-14 LAB
ALBUMIN SERPL BCG-MCNC: 4 G/DL (ref 3.5–5.2)
ALP SERPL-CCNC: 49 U/L (ref 40–150)
ALT SERPL W P-5'-P-CCNC: 23 U/L (ref 0–50)
AST SERPL W P-5'-P-CCNC: 29 U/L (ref 0–45)
BASOPHILS # BLD AUTO: 0 10E3/UL (ref 0–0.2)
BASOPHILS NFR BLD AUTO: 0 %
BILIRUB DIRECT SERPL-MCNC: <0.2 MG/DL (ref 0–0.3)
BILIRUB SERPL-MCNC: 0.3 MG/DL
CREAT SERPL-MCNC: 0.98 MG/DL (ref 0.51–0.95)
CRP SERPL-MCNC: 10.34 MG/L
EGFRCR SERPLBLD CKD-EPI 2021: 73 ML/MIN/1.73M2
EOSINOPHIL # BLD AUTO: 0.4 10E3/UL (ref 0–0.7)
EOSINOPHIL NFR BLD AUTO: 5 %
ERYTHROCYTE [DISTWIDTH] IN BLOOD BY AUTOMATED COUNT: 14.1 % (ref 10–15)
ERYTHROCYTE [SEDIMENTATION RATE] IN BLOOD BY WESTERGREN METHOD: 8 MM/HR (ref 0–20)
HCT VFR BLD AUTO: 38.6 % (ref 35–47)
HGB BLD-MCNC: 13 G/DL (ref 11.7–15.7)
IMM GRANULOCYTES # BLD: 0 10E3/UL
IMM GRANULOCYTES NFR BLD: 0 %
LYMPHOCYTES # BLD AUTO: 3.2 10E3/UL (ref 0.8–5.3)
LYMPHOCYTES NFR BLD AUTO: 48 %
MCH RBC QN AUTO: 31.3 PG (ref 26.5–33)
MCHC RBC AUTO-ENTMCNC: 33.7 G/DL (ref 31.5–36.5)
MCV RBC AUTO: 93 FL (ref 78–100)
MONOCYTES # BLD AUTO: 0.7 10E3/UL (ref 0–1.3)
MONOCYTES NFR BLD AUTO: 11 %
NEUTROPHILS # BLD AUTO: 2.4 10E3/UL (ref 1.6–8.3)
NEUTROPHILS NFR BLD AUTO: 35 %
PLATELET # BLD AUTO: 189 10E3/UL (ref 150–450)
PROT SERPL-MCNC: 7 G/DL (ref 6.4–8.3)
RBC # BLD AUTO: 4.16 10E6/UL (ref 3.8–5.2)
WBC # BLD AUTO: 6.8 10E3/UL (ref 4–11)

## 2024-05-14 PROCEDURE — 36415 COLL VENOUS BLD VENIPUNCTURE: CPT

## 2024-05-14 PROCEDURE — 86140 C-REACTIVE PROTEIN: CPT

## 2024-05-14 PROCEDURE — 80076 HEPATIC FUNCTION PANEL: CPT

## 2024-05-14 PROCEDURE — 85652 RBC SED RATE AUTOMATED: CPT

## 2024-05-14 PROCEDURE — 82565 ASSAY OF CREATININE: CPT

## 2024-05-14 PROCEDURE — 85025 COMPLETE CBC W/AUTO DIFF WBC: CPT

## 2024-05-20 ENCOUNTER — ANCILLARY PROCEDURE (OUTPATIENT)
Dept: MAMMOGRAPHY | Facility: CLINIC | Age: 44
End: 2024-05-20
Attending: FAMILY MEDICINE
Payer: COMMERCIAL

## 2024-05-20 ENCOUNTER — OFFICE VISIT (OUTPATIENT)
Dept: RHEUMATOLOGY | Facility: CLINIC | Age: 44
End: 2024-05-20
Payer: COMMERCIAL

## 2024-05-20 VITALS
BODY MASS INDEX: 40.58 KG/M2 | OXYGEN SATURATION: 97 % | SYSTOLIC BLOOD PRESSURE: 119 MMHG | RESPIRATION RATE: 18 BRPM | DIASTOLIC BLOOD PRESSURE: 82 MMHG | WEIGHT: 282.8 LBS | HEART RATE: 89 BPM

## 2024-05-20 DIAGNOSIS — Z12.31 VISIT FOR SCREENING MAMMOGRAM: ICD-10-CM

## 2024-05-20 DIAGNOSIS — M06.09 RHEUMATOID ARTHRITIS OF MULTIPLE SITES WITH NEGATIVE RHEUMATOID FACTOR (H): Primary | ICD-10-CM

## 2024-05-20 DIAGNOSIS — Z79.899 HIGH RISK MEDICATION USE: ICD-10-CM

## 2024-05-20 PROCEDURE — 77063 BREAST TOMOSYNTHESIS BI: CPT | Mod: TC | Performed by: RADIOLOGY

## 2024-05-20 PROCEDURE — G2211 COMPLEX E/M VISIT ADD ON: HCPCS | Performed by: INTERNAL MEDICINE

## 2024-05-20 PROCEDURE — 77067 SCR MAMMO BI INCL CAD: CPT | Mod: TC | Performed by: RADIOLOGY

## 2024-05-20 PROCEDURE — 99214 OFFICE O/P EST MOD 30 MIN: CPT | Performed by: INTERNAL MEDICINE

## 2024-05-20 NOTE — PROGRESS NOTES
Rheumatology Clinic Visit      Vida Whitfield MRN# [id number]   YOB: 1980 Age: 44 year old      Date of visit: 5/20/24   PCP: Dr. Fco Medeiros  Ophthalmology: [facility] in [place], [place]    Chief Complaint   Patient presents with:  Rheumatoid Arthritis: Feeling a lot better since starting Humira    Assessment and Plan     1. Seronegative nonerosive rheumatoid arthritis: Currently on methotrexate 15 mg once weekly (Cr elevation possibly secondary to MTX so MTX was reduced with improvement of Cr), folic acid 1 mg daily, sulfasalazine 1000 mg twice daily, hydroxychloroquine 400 mg daily, and Humira (started 10/26/2023, effective).  Significant provide since starting Humira.  In an effort to reduce DMARD because she is doing well since starting Humira, discontinue hydroxychloroquine.  Note that she has an eye exam in 2 weeks that she should keep in case hydroxychloroquine needs to be used again.  If doing well in the future then consider reducing sulfasalazine or methotrexate.    Chronic illness, stable.    - Continue methotrexate 15mg once weekly  - Continue folic acid 1 mg daily  - Discontinue  hydroxychloroquine 400mg daily (last eye exam performed at [facility] on 7/18/2022; yearly eye exam required and advised that she call to schedule)  - Continue sulfasalazine 1000 mg twice daily  - Continue Humira 40 mg SQ every 14 days   - Avoid oral NSAIDs because they are historically triggers for urticaria and because of creatinine elevation; tolerates voltaren gel  - Labs in 3 months: CBC, Creatinine, Hepatic Panel, ESR, CRP    High risk medication requiring intensive toxicity monitoring at least quarterly.     # On birth control per patient    2.  History of bilateral patellofemoral syndrome: Previous steroid injections have been effective; repeat injection not needed at this time.  Asymptomatic at this time    3. Urticaria: Historically, oral NSAIDs are triggers.  Documented here for clinical  significance only.  Not managed in this clinic.    4. Bone health: 9/20/2016 Vitamin D level normal.     5.  Right Achilles enthesitis: has followed with podiatry.  Improved with prednisone and a walking boot.  Improved with addition of Humira.    6.  Vaccinations: Vaccinations reviewed with Ms. Whitfield.     - Influenza: up to date per patient  - Voasvft27: up to date  - Ieqzheyhy78: up to date  - Pkonlnl51: Anticipate receiving 2026  - COVID-19: Up to date; additional vaccinations refused by patient    Total minutes spent in evaluation with patient, documentation, , and review of pertinent studies and chart notes: 14  The longitudinal plan of care for the rheumatology problem(s) were addressed during this visit.  Due to added complexity of care, we will continue to support the patient and the subsequent management of this condition with ongoing continuity of care.        Ms. Whitfield verbalized agreement with and understanding of the rational for the diagnosis and treatment plan.  All questions were answered to best of my ability and the patient's satisfaction. Ms. Whitfield was advised to contact the clinic with any questions that may arise after the clinic visit.      Thank you for involving me in the care of the patient    Return to clinic: 3 months    HPI   Vida Whitfield is a 44 year old female with a medical history significant for anxiety, depression, chronic idiopathic urticaria, hyperlipidemia, hypothyroidism, rosacea, obstructive sleep apnea, and inflammatory arthritis who presents for follow-up of rheumatoid arthritis.    Historical records in this paragraph: She was previously evaluated by Dr. Blaine Anthony at [facility].  2014 labs show negative: hepatitis C ab, HBV surface ag, HBV core ab, HLA-B27, Sm, RNP, SSA, SSB, Scl-70, DNA, cardiolipin, CCP, PR3, MPO. NIKKIE 1:160 homogeneous.  MPO also positive (one negative and one positive value). Per a 3/11/2016 clinic note, she has seronegative rheumatoid  arthritis and chronic urticaria. She developed joint swelling and stiffness in the bilateral MCPs, MTPs, ankles, and knees that started in November 2013 shortly after tapering off prednisone that was used to manage chronic urticaria. No history of psoriasis or inflammatory bowel disease. Negative SI joint x-rays. Negative MRI of the SI joint. Flaring of her urticaria with NSAIDs. Steroid responsive in regard to her joints. Near resolution of joint stiffness and pain with methotrexate. NSAIDs are avoided because they cause flares of her urticaria.    1/23/2023: In the last 1 week she has had more pain at the PIPs > MCPs > left ankle.  No swelling of these joints.  Morning stiffness for about 1 hour.  Pain and stiffness is worse in the morning and improves with time and activity.  She attributes the increased joint pain to the weather.    4/21/2023: The prednisone course given in January 2023 was effective and resolved the inflammatory arthritis symptoms at the ankles, PIPs, and MCPs.  She then did well for a while but for the past 2 weeks has had right Achilles enthesitis pain that was evaluated by podiatry and she was placed on prednisone with improvement of the Achilles enthesitis, but no other improvement of her joints because they were already doing well.  She is also going to use a walking boot from podiatry.    7/21/2023: increased pain and stiffness of the knees in the morning that improves with walking around.  Knees bothered with going up and down stairs.  Typically sleeps with knees bent.  Knees without swelling, increased warmth, or overlying erythema.  Knees more symptomatic for about 1.5 months.  Ankles stiff in the morning; duration of about 1 hour, similar to the knees but resolves quicker; duration of 1.5 months.  Heel pain on the right; improved but not resolved; walking boot with some improvement.    10/24/2023: Pain of the right second and third MCPs, left wrist, and both shoulders; pain is worse  in the morning and improves with time and activity.  Morning stiffness for 2 hours; positive gelling phenomenon.  Still with pain at the right Achilles enthesis that is better with wearing supportive shoes that have adequate padding     1/30/2024: Significant improvement with addition of Humira.  No injection site issues.  Right Achilles enthesitis still mildly active, but possibly improved.  Other joint symptoms significantly improved.  Morning stiffness for about 30-60 minutes.  No joint swelling.  Arthritis does not limit daily activities.    Today, 5/20/2024: Currently doing well.  No joint pain or swelling.  No morning stiffness or gelling phenomenon.  States that she has felt well ever since starting Humira.  Has an eye exam for hydroxychloroquine toxicity monitoring scheduled in 2 weeks.    No fevers or chills.  No nausea or vomiting.  No blood in her stool.  No oral or nasal sores.  No chest pain/pressure, palpitations, or shortness of breath.  No cough.  No eye pain or redness.    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    ROS   12 point review of system was completed and negative except as noted in the HPI     Active Problem List     Patient Active Problem List   Diagnosis    Contraceptive management    Hyperhidrosis of axilla    CARDIOVASCULAR SCREENING; LDL GOAL LESS THAN 160    Generalized anxiety disorder    Moderate recurrent major depression (H)    Chronic idiopathic urticaria    Hyperlipidemia with target LDL less than 130    Hypothyroidism, unspecified hypothyroidism type    Rosacea    Non morbid obesity due to excess calories    BRIDGETTE (obstructive sleep apnea)    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 factor (H)    High risk medication use    Obesity (BMI 35.0-39.9) with comorbidity (H)    Hemorrhagic condition, unspecified (H24)    Screening  for cervical cancer     Past Medical History     Past Medical History:   Diagnosis Date    Arthritis 01/01/2013    Depressive disorder 01/01/2003    Peripheral autonomic neuropathy in disorders classified elsewhere(337.1)     Pure hypercholesterolemia     Thyroid disease 01/01/1995     Past Surgical History     Past Surgical History:   Procedure Laterality Date    CHOLECYSTECTOMY, LAPOROSCOPIC  2006    Cholecystectomy, Laparoscopic    SURGICAL HISTORY OF -   17 years old    Thyroid ablation    ZZC APPENDECTOMY  2002     Allergy     Allergies   Allergen Reactions    Macrobid [Nitrofuran Derivatives]     Paxil [Paroxetine]      Anxiety        Zoloft      Mood changes     Current Medication List     Current Outpatient Medications   Medication Sig Dispense Refill    adalimumab (HUMIRA *CF*) 40 MG/0.4ML pen kit Inject 0.4 mLs (40 mg) Subcutaneous every 14 days . Hold for signs of infection, then seek medical attention. 0.8 mL 4    buPROPion (WELLBUTRIN XL) 150 MG 24 hr tablet Take 3 tablets by mouth once daily. 270 tablet 0    busPIRone (BUSPAR) 15 MG tablet Take 1 tablet by mouth twice daily. 180 tablet 2    cetirizine (ZYRTEC) 10 MG tablet Take 20 mg by mouth daily.      diclofenac (VOLTAREN) 1 % GEL topical gel Apply 2 grams to hands four times daily using enclosed dosing card. 100 g 3    folic acid (FOLVITE) 1 MG tablet Take 1 tablet (1 mg) by mouth daily 90 tablet 2    hydroxychloroquine (PLAQUENIL) 200 MG tablet Take 2 tablets (400 mg) by mouth daily 180 tablet 2    levonorgestrel-ethinyl estradiol (SEASONALE) 0.15-0.03 MG tablet Take 1 tablet by mouth daily. 30 tablet 0    levothyroxine (SYNTHROID/LEVOTHROID) 175 MCG tablet Take 1 tablet by mouth daily. 90 tablet 3    methotrexate 2.5 MG tablet Take 6 tablets (15 mg) by mouth once a week . Take all 6 tablets on the same day of each week. 78 tablet 2    Multiple Vitamin (MULTIVITAMIN) per tablet Take 1 tablet by mouth daily. 100 tablet 12    sulfaSALAzine ER  "(AZULFIDINE EN) 500 MG EC tablet Take 2 tablets (1,000 mg) by mouth 2 times daily 360 tablet 2    traZODone (DESYREL) 50 MG tablet Take 1 to 2 tablets by mouth nightly at bedtime. **Provider visit due for further refills** 90 tablet 0    ALPRAZolam (XANAX) 0.5 MG tablet Take 1 tablet (0.5 mg) by mouth 3 times daily as needed for anxiety (Patient not taking: Reported on 12/27/2023) 20 tablet 0    order for DME Equipment being ordered: CPAP 6-10 cm       No current facility-administered medications for this visit.         Social History   See HPI    Family History     Family History   Problem Relation Age of Onset    Diabetes Mother     Hypertension Mother     Lipids Mother     Hyperlipidemia Mother     Hypertension Father     Lipids Father     Thyroid Disease Father     Cancer Father     Hyperlipidemia Father     Other Cancer Father         Gastric & Bone Cancer    Diabetes Maternal Grandmother     Diabetes Paternal Grandmother     Melanoma No family hx of        Physical Exam     Temp Readings from Last 3 Encounters:   01/30/24 98.3  F (36.8  C) (Tympanic)   12/27/23 98  F (36.7  C) (Tympanic)   04/21/23 98.6  F (37  C) (Tympanic)     BP Readings from Last 5 Encounters:   05/20/24 119/82   01/30/24 137/84   01/30/24 113/80   12/27/23 122/86   10/24/23 135/82     Pulse Readings from Last 1 Encounters:   05/20/24 89     Resp Readings from Last 1 Encounters:   05/20/24 18     Estimated body mass index is 40.58 kg/m  as calculated from the following:    Height as of 1/30/24: 1.778 m (5' 10\").    Weight as of this encounter: 128.3 kg (282 lb 12.8 oz).      GEN: NAD  HEENT:  Anicteric, noninjected sclera. No obvious external lesions of the ear and nose. Hearing intact.  CV: S1, S2. RRR. No m/r/g  PULM: No increased work of breathing. CTA bilaterally   MSK: MCPs, PIPs, DIPs without swelling or tenderness to palpation.  Wrists without swelling or tenderness to palpation.  Elbows and shoulders without swelling or tenderness " to palpation. Knees, ankles, and MTPs without swelling or tenderness to palpation.   SKIN: No rash or jaundice seen  PSYCH: Alert. Appropriate.      Labs / Imaging (select studies)     CBC  Recent Labs   Lab Test 05/14/24  1606 01/23/24  1602 10/15/23  1135 10/24/21  1057 06/24/21  0721 12/16/20  1826 11/16/20  1514 10/15/20  1434   WBC 6.8 7.1 6.4   < > 6.8 6.6   < > 7.0   RBC 4.16 4.16 4.08   < > 4.17 4.05   < > 3.84   HGB 13.0 13.1 13.1   < > 13.1 13.0   < > 12.2   HCT 38.6 39.2 37.8   < > 38.1 38.3   < > 35.8   MCV 93 94 93   < > 91 95   < > 93   RDW 14.1 14.0 14.1   < > 13.1 12.9   < > 12.3    173 156   < > 182 175   < > 170   MCH 31.3 31.5 32.1   < > 31.4 32.1   < > 31.8   MCHC 33.7 33.4 34.7   < > 34.4 33.9   < > 34.1   NEUTROPHIL 35 36 42   < > 49.0 45.7  --  44.7   LYMPH 48 50 48   < > 40.5 43.1  --  44.8   MONOCYTE 11 11 10   < > 8.3 10.4  --  10.0   EOSINOPHIL 5 3 0   < > 1.8 0.3  --  0.1   BASOPHIL 0 1 1   < > 0.4 0.5  --  0.4   ANEU  --   --   --   --  3.3 3.0  --  3.1   ALYM  --   --   --   --  2.7 2.8  --  3.1   FREDA  --   --   --   --  0.6 0.7  --  0.7   AEOS  --   --   --   --  0.1 0.0  --  0.0   ABAS  --   --   --   --  0.0 0.0  --  0.0   ANEUTAUTO 2.4 2.5 2.7   < >  --   --   --   --    ALYMPAUTO 3.2 3.5 3.1   < >  --   --   --   --    AMONOAUTO 0.7 0.8 0.6   < >  --   --   --   --    AEOSAUTO 0.4 0.2 0.0   < >  --   --   --   --    ABSBASO 0.0 0.1 0.0   < >  --   --   --   --     < > = values in this interval not displayed.     CMP  Recent Labs   Lab Test 05/14/24  1606 01/23/24  1602 12/27/23  0739 10/24/23  0827 10/15/23  1135 10/24/21  1057 06/24/21  0721 12/16/20  1826 10/15/20  1434 12/17/19  1640 10/19/19  1041 12/10/18  1900 09/23/18  1058 01/26/17  0745 12/20/16  1138   NA  --   --   --   --   --   --   --   --   --   --   --   --   --   --  138   POTASSIUM  --   --   --   --   --   --   --   --   --   --   --   --   --   --  4.4   CHLORIDE  --   --   --   --   --   --   --   --    --   --   --   --   --   --  104   CO2  --   --   --   --   --   --   --   --   --   --   --   --   --   --  30   ANIONGAP  --   --   --   --   --   --   --   --   --   --   --   --   --   --  4   GLC  --   --  88  --   --   --   --   --   --   --  81  --  79  --  84   BUN  --   --   --   --   --   --   --   --   --   --   --   --   --   --  13   CR 0.98* 0.95  --  1.10* 1.22*   < > 1.03 0.99 0.97   < >  --    < >  --    < > 1.05*   GFRESTIMATED 73 76  --  64 57*   < > 68 71 73   < >  --    < >  --    < > 59*   GFRESTBLACK  --   --   --   --   --   --  79 83 85   < >  --    < >  --    < > 72   TATI  --   --   --   --   --   --   --   --   --   --   --   --   --   --  8.7   BILITOTAL 0.3 0.4  --   --  0.5   < > 0.6 0.3 0.4   < >  --    < >  --    < >  --    ALBUMIN 4.0 4.2  --   --  4.0   < > 3.3* 3.8 3.5   < >  --    < >  --    < >  --    PROTTOTAL 7.0 7.2  --   --  6.7   < > 6.8 7.3 7.2   < >  --    < >  --    < >  --    ALKPHOS 49 43  --   --  39   < > 47 57 45   < >  --    < >  --    < >  --    AST 29 23  --   --  26   < > 17 16 19   < >  --    < >  --    < >  --    ALT 23 18  --   --  20   < > 21 23 22   < >  --    < >  --    < >  --     < > = values in this interval not displayed.     Calcium/VitaminD  Recent Labs   Lab Test 01/30/24  1105 12/20/16  1138 09/20/16  1554   TATI  --  8.7  --    VITDT 17*  --  36     ESR/CRP  Recent Labs   Lab Test 05/14/24  1606 01/23/24  1602 10/15/23  1135 01/19/23  1615 07/22/22  1528 01/25/22  1508 10/24/21  1057   SED 8 7 7   < > 8 7 7   CRP  --   --   --   --  7.8 8.6* 6.7   CRPI 10.34* 5.60* 7.85*   < >  --   --   --     < > = values in this interval not displayed.     Lipid Panel  Recent Labs   Lab Test 12/27/23  0739 10/19/19  1041 09/23/18  1058   CHOL 160 187 166   TRIG 160* 80 91   HDL 52 58 54   LDL 76 113* 94   NHDL 108 129 112     Hepatitis B  Recent Labs   Lab Test 07/18/23  1527   HBCAB Nonreactive   HEPBANG Nonreactive     Hepatitis C  Recent Labs   Lab Test  07/18/23  1527   HCVAB Nonreactive     Lyme ab screening  Recent Labs   Lab Test 10/24/23  0827   LYMEGM 0.11     Tuberculosis Screening  Recent Labs   Lab Test 10/24/23  0827   TBRES Negative     HIV Screening  Recent Labs   Lab Test 01/23/24  1602 10/24/23  0827   HIAGAB Nonreactive Nonreactive     --------------------------------------------------    From care everywhere:      Immunization History     Immunization History   Administered Date(s) Administered    COVID-19 Monovalent 18+ (Moderna) 08/18/2021, 09/15/2021    Flu, Unspecified 10/22/2013, 10/03/2014    [id number] Novel Flu- Nasal 10/21/2009    HepB 07/22/1999    Hepatitis B, Adult 07/22/1999, 08/25/1999, 01/28/2000    Historical DTP/aP 1981, 1980, 1981, 11/02/1982    Influenza (IIV3) PF 11/05/1996, 10/19/2010, 10/31/2019    Influenza Vaccine >6 months,quad, PF 10/18/2018, 10/31/2019, 10/29/2020, 10/14/2021, 10/19/2022    Influenza, seasonal, injectable, PF 10/30/2006, 11/02/2007, 10/21/2008, 10/12/2009    MMR 09/07/1992, 08/05/1993    Mantoux Tuberculin Skin Test 02/03/2012, 04/04/2012, 09/30/2014, 10/07/2014    OPV, trivalent, live 1980, 1981, 1981, 11/02/1982    Pneumo Conj 13-V (2010&after) 09/20/2016    Pneumococcal 23 valent 12/20/2016    TD,PF 7+ (Tenivac) 07/14/1995, 01/17/2006    TDAP (Adacel,Boostrix) 12/10/2013, 12/27/2023            Chart documentation done in part with Dragon Voice recognition Software. Although reviewed after completion, some word and grammatical error may remain.      Andrea Benítez MD

## 2024-05-20 NOTE — NURSING NOTE
RAPID3 (0-30) Cumulative Score  3.0          RAPID3 Weighted Score (divide #4 by 3 and that is the weighted score)  1.0

## 2024-05-20 NOTE — PATIENT INSTRUCTIONS
RHEUMATOLOGY    [facility]  [address] [place] [address]  [place] [address]    Phone number: [phone]  Fax number: [phone]    If you need a medication refill, please contact us as you may need lab work and/or a follow up visit prior to your refill.      Thank you for choosing [facility]!    Nicolette Sarmiento CMA Rheumatology

## 2024-06-22 DIAGNOSIS — F33.1 MODERATE RECURRENT MAJOR DEPRESSION (H): ICD-10-CM

## 2024-06-24 RX ORDER — BUPROPION HYDROCHLORIDE 150 MG/1
TABLET ORAL
Qty: 270 TABLET | Refills: 0 | Status: SHIPPED | OUTPATIENT
Start: 2024-06-24

## 2024-06-24 NOTE — TELEPHONE ENCOUNTER
Requested Prescriptions   Pending Prescriptions Disp Refills    buPROPion (WELLBUTRIN XL) 150 MG 24 hr tablet [Pharmacy Med Name: Bupropion Hydrochloride 150mg Extended-Release (XL) Tablet] 270 tablet 0     Sig: Take 3 tablets by mouth once daily.       Rx Protocol Bupropion Failed - 6/22/2024  3:56 PM        Failed - Blood pressure on file in the past 12 months        Passed - PHQ-9 score of less than 5 in past 6 months     Please review last PHQ-9 score.           Passed - The medication is active on the med list        Passed - Recent (12 mo) or future (90 days) visit within the authorizing provider's specialty     The patient must have completed an in-person or virtual visit within the past 12 months or has a future visit scheduled within the next 90 days with the authorizing provider s specialty.  Urgent care and e-visits do not quality as an office visit for this protocol.          Passed - Medication is indicated for associated diagnosis     Medication is associated with one or more of the following diagnoses:  Anxiety  Bipolar Disorder  Depression  Smoking Cessation          Passed - Patient is age 18 or older        Passed - No active pregnancy on record        Passed - No positive pregnancy test in past 12 months       SSRIs Protocol Passed - 6/22/2024  3:56 PM        Passed - PHQ-9 score less than 5 in past 6 months     Please review last PHQ-9 score.           Passed - Medication is active on med list        Passed - The patient must have completed an in-person or virtual visit. Urgent care and e-visits do not quality as an office visit for this protocol.     The patient must have completed an in-person or virtual visit within the past 12 months or has a future visit scheduled within the next 90 days with the authorizing provider s specialty.  Urgent care and e-visits do not quality as an office visit for this protocol.          Passed - Medication indicated for associated diagnosis     Medication is  associated with one or more of the following diagnoses:             Anxiety            Bipolar            Depression            Obsessive-compulsive disorder            Panic disorder            Postmenopausal flushing            Premenstrual dysphoric disorder            Social phobia             Passed - Patient is age 18 or older        Passed - No active pregnancy on record        Passed - No positive pregnancy test in last 12 months

## 2024-07-18 DIAGNOSIS — Z30.41 ENCOUNTER FOR SURVEILLANCE OF CONTRACEPTIVE PILLS: ICD-10-CM

## 2024-07-19 RX ORDER — LEVONORGESTREL AND ETHINYL ESTRADIOL 0.15-0.03
KIT ORAL
Qty: 91 TABLET | Refills: 1 | Status: SHIPPED | OUTPATIENT
Start: 2024-07-19

## 2024-08-16 ENCOUNTER — TELEPHONE (OUTPATIENT)
Dept: RHEUMATOLOGY | Facility: CLINIC | Age: 44
End: 2024-08-16
Payer: COMMERCIAL

## 2024-08-16 DIAGNOSIS — M06.09 RHEUMATOID ARTHRITIS OF MULTIPLE SITES WITH NEGATIVE RHEUMATOID FACTOR (H): Primary | ICD-10-CM

## 2024-08-16 NOTE — TELEPHONE ENCOUNTER
[facility] Call Center    Phone Message    May a detailed message be left on voicemail: yes     Reason for Call: Medication Question or concern regarding medication   Prescription Clarification  Name of Medication: adalimumab (HUMIRA *CF*) 40 MG/0.4ML pen kit   Prescribing Provider: Dr. Benítez    Pharmacy: [facility]    What on the order needs clarification?      Pharmacy calling to update care team, pt medication was converted interchangeable biosimilar.    Action Taken: Other: rheum      Travel Screening: Not Applicable     Date of Service:

## 2024-08-16 NOTE — TELEPHONE ENCOUNTER
Checked [facility] Portal:        Humira was filled as bio similar: ADALIMUMAB-RYVK 40MG/0.4ML AI PEN KIT 2S    Bio similar was delivered to patients home 08/08/2024.    Thank You,     Chaya Gallardo [facility]  Specialty Pharmacy Clinic [facility]  [email or web address]  [email or web address]  Phone: [phone]  Fax: [phone]    Thank You,     Chaya Gallardo [facility]  Specialty Pharmacy Clinic [facility] Special[facility]  [email or web address]  [email or web address]  Phone: [phone]  Fax: [phone]

## 2024-08-19 RX ORDER — ADALIMUMAB-RYVK 40MG/0.4ML
40 KIT SUBCUTANEOUS
Qty: 1 EACH | Refills: 1 | Status: SHIPPED | OUTPATIENT
Start: 2024-08-19 | End: 2024-08-29

## 2024-08-27 ENCOUNTER — LAB (OUTPATIENT)
Dept: LAB | Facility: CLINIC | Age: 44
End: 2024-08-27
Payer: COMMERCIAL

## 2024-08-27 DIAGNOSIS — M06.09 RHEUMATOID ARTHRITIS OF MULTIPLE SITES WITH NEGATIVE RHEUMATOID FACTOR (H): ICD-10-CM

## 2024-08-27 DIAGNOSIS — Z79.899 HIGH RISK MEDICATION USE: ICD-10-CM

## 2024-08-27 LAB
ALBUMIN SERPL BCG-MCNC: 4.2 G/DL (ref 3.5–5.2)
ALP SERPL-CCNC: 45 U/L (ref 40–150)
ALT SERPL W P-5'-P-CCNC: 22 U/L (ref 0–50)
AST SERPL W P-5'-P-CCNC: 24 U/L (ref 0–45)
BASOPHILS # BLD AUTO: 0.1 10E3/UL (ref 0–0.2)
BASOPHILS NFR BLD AUTO: 1 %
BILIRUB DIRECT SERPL-MCNC: <0.2 MG/DL (ref 0–0.3)
BILIRUB SERPL-MCNC: 0.4 MG/DL
CREAT SERPL-MCNC: 1.1 MG/DL (ref 0.51–0.95)
CRP SERPL-MCNC: 5.33 MG/L
EGFRCR SERPLBLD CKD-EPI 2021: 64 ML/MIN/1.73M2
EOSINOPHIL # BLD AUTO: 0.6 10E3/UL (ref 0–0.7)
EOSINOPHIL NFR BLD AUTO: 7 %
ERYTHROCYTE [DISTWIDTH] IN BLOOD BY AUTOMATED COUNT: 14.3 % (ref 10–15)
ERYTHROCYTE [SEDIMENTATION RATE] IN BLOOD BY WESTERGREN METHOD: 7 MM/HR (ref 0–20)
HCT VFR BLD AUTO: 39.2 % (ref 35–47)
HGB BLD-MCNC: 13.3 G/DL (ref 11.7–15.7)
IMM GRANULOCYTES # BLD: 0 10E3/UL
IMM GRANULOCYTES NFR BLD: 0 %
LYMPHOCYTES # BLD AUTO: 3.3 10E3/UL (ref 0.8–5.3)
LYMPHOCYTES NFR BLD AUTO: 37 %
MCH RBC QN AUTO: 32 PG (ref 26.5–33)
MCHC RBC AUTO-ENTMCNC: 33.9 G/DL (ref 31.5–36.5)
MCV RBC AUTO: 95 FL (ref 78–100)
MONOCYTES # BLD AUTO: 0.8 10E3/UL (ref 0–1.3)
MONOCYTES NFR BLD AUTO: 9 %
NEUTROPHILS # BLD AUTO: 4.1 10E3/UL (ref 1.6–8.3)
NEUTROPHILS NFR BLD AUTO: 47 %
PLATELET # BLD AUTO: 171 10E3/UL (ref 150–450)
PROT SERPL-MCNC: 7.1 G/DL (ref 6.4–8.3)
RBC # BLD AUTO: 4.15 10E6/UL (ref 3.8–5.2)
WBC # BLD AUTO: 8.8 10E3/UL (ref 4–11)

## 2024-08-27 PROCEDURE — 82565 ASSAY OF CREATININE: CPT

## 2024-08-27 PROCEDURE — 80076 HEPATIC FUNCTION PANEL: CPT

## 2024-08-27 PROCEDURE — 85025 COMPLETE CBC W/AUTO DIFF WBC: CPT

## 2024-08-27 PROCEDURE — 36415 COLL VENOUS BLD VENIPUNCTURE: CPT

## 2024-08-27 PROCEDURE — 85652 RBC SED RATE AUTOMATED: CPT

## 2024-08-27 PROCEDURE — 86140 C-REACTIVE PROTEIN: CPT

## 2024-08-29 ENCOUNTER — OFFICE VISIT (OUTPATIENT)
Dept: RHEUMATOLOGY | Facility: CLINIC | Age: 44
End: 2024-08-29
Payer: COMMERCIAL

## 2024-08-29 VITALS
BODY MASS INDEX: 38.45 KG/M2 | SYSTOLIC BLOOD PRESSURE: 133 MMHG | OXYGEN SATURATION: 97 % | DIASTOLIC BLOOD PRESSURE: 71 MMHG | WEIGHT: 268 LBS | RESPIRATION RATE: 18 BRPM | HEART RATE: 66 BPM

## 2024-08-29 DIAGNOSIS — Z79.899 HIGH RISK MEDICATION USE: ICD-10-CM

## 2024-08-29 DIAGNOSIS — M06.09 RHEUMATOID ARTHRITIS OF MULTIPLE SITES WITH NEGATIVE RHEUMATOID FACTOR (H): Primary | ICD-10-CM

## 2024-08-29 PROCEDURE — 99214 OFFICE O/P EST MOD 30 MIN: CPT | Performed by: INTERNAL MEDICINE

## 2024-08-29 PROCEDURE — G2211 COMPLEX E/M VISIT ADD ON: HCPCS | Performed by: INTERNAL MEDICINE

## 2024-08-29 RX ORDER — METHOTREXATE 2.5 MG/1
15 TABLET ORAL WEEKLY
Qty: 78 TABLET | Refills: 2 | Status: SHIPPED | OUTPATIENT
Start: 2024-08-29

## 2024-08-29 RX ORDER — SULFASALAZINE 500 MG/1
500 TABLET, DELAYED RELEASE ORAL 2 TIMES DAILY
Qty: 180 TABLET | Refills: 1 | Status: SHIPPED | OUTPATIENT
Start: 2024-08-29

## 2024-08-29 RX ORDER — FOLIC ACID 1 MG/1
1 TABLET ORAL DAILY
Qty: 90 TABLET | Refills: 2 | Status: SHIPPED | OUTPATIENT
Start: 2024-08-29

## 2024-08-29 RX ORDER — ADALIMUMAB-RYVK 40MG/0.4ML
40 KIT SUBCUTANEOUS
Qty: 0.8 ML | Refills: 9 | Status: SHIPPED | OUTPATIENT
Start: 2024-08-29

## 2024-08-29 NOTE — PROGRESS NOTES
Rheumatology Clinic Visit      Vida Whitfield MRN# [id number]   YOB: 1980 Age: 44 year old      Date of visit: 8/29/24   PCP: Dr. Fco Medeiros  Ophthalmology: [facility] in [place], [place]    Chief Complaint   Patient presents with:  Rheumatoid Arthritis: Feeling really good except for bone spur on heel    Assessment and Plan     1. Seronegative nonerosive rheumatoid arthritis: Currently on methotrexate 15 mg once weekly (Cr elevation possibly secondary to MTX so MTX was reduced with improvement of Cr), folic acid 1 mg daily, sulfasalazine 1000 mg twice daily, hydroxychloroquine 400 mg daily, and adalimumab (started Humira 10/26/2023, effective; changed to Simlandi per insurance requirement in 2024).  Significant provide since starting Humira.  In an effort to reduce DMARD because she is doing well since starting Humira, discontinued hydroxychloroquine previously without worsening arthritis symptoms so will now reduce sulfasalazine.  Consider stopping sulfasalazine at follow-up if no worsening arthritis symptoms.     Chronic illness, stable.    - Continue methotrexate 15mg once weekly  - Continue folic acid 1 mg daily  - Reduce sulfasalazine from 1000 mg twice daily, to 500 mg twice daily  - Continue Simlandi (adalimumab-RYVK) 40 mg SQ every 14 days   - Avoid oral NSAIDs because they are historically triggers for urticaria and because of creatinine elevation; tolerates voltaren gel  - Labs in 3 months: CBC, Creatinine, Hepatic Panel, ESR, CRP    High risk medication requiring intensive toxicity monitoring at least quarterly.     # On birth control per patient    2.  History of bilateral patellofemoral syndrome: Previous steroid injections have been effective; repeat injection not needed at this time.  Asymptomatic at this time    3. Urticaria: Historically, oral NSAIDs are triggers.  Documented here for clinical significance only.  Not managed in this clinic.    4. Bone health: 9/20/2016 Vitamin  D level normal.     5.  Right Achilles enthesitis: has followed with podiatry.  Improved with prednisone and a walking boot.  Improved with addition of Humira.  Has not worsened with discontinuation of hydroxychloroquine.  Currently with degenerative Achilles tendon pain, infrequent, and only with increased physical activity when it does occur that resolves with rest     6.  Vaccinations: Vaccinations reviewed with Ms. Whitfield.     - Influenza: up to date per patient  - [id number]: up to date  - [id number]: up to date  - [id number]: Anticipate receiving 2026  - COVID-19: Up to date; additional vaccinations refused by patient    Total minutes spent in evaluation with patient, documentation, , and review of pertinent studies and chart notes: 12  The longitudinal plan of care for the rheumatology problem(s) were addressed during this visit.  Due to added complexity of care, we will continue to support the patient and the subsequent management of this condition with ongoing continuity of care.      Ms. Whitfield verbalized agreement with and understanding of the rational for the diagnosis and treatment plan.  All questions were answered to best of my ability and the patient's satisfaction. Ms. Whitfield was advised to contact the clinic with any questions that may arise after the clinic visit.      Thank you for involving me in the care of the patient    Return to clinic: 3 months    HPI   Vida Whitfield is a 44 year old female with a medical history significant for anxiety, depression, chronic idiopathic urticaria, hyperlipidemia, hypothyroidism, rosacea, obstructive sleep apnea, and inflammatory arthritis who presents for follow-up of rheumatoid arthritis.    Historical records in this paragraph: She was previously evaluated by Dr. Blaine Anthony at [facility].  2014 labs show negative: hepatitis C ab, HBV surface ag, HBV core ab, HLA-B27, Sm, RNP, SSA, SSB, Scl-70, DNA, cardiolipin, CCP, PR3, MPO. NIKKIE 1:160  homogeneous.  MPO also positive (one negative and one positive value). Per a 3/11/2016 clinic note, she has seronegative rheumatoid arthritis and chronic urticaria. She developed joint swelling and stiffness in the bilateral MCPs, MTPs, ankles, and knees that started in November 2013 shortly after tapering off prednisone that was used to manage chronic urticaria. No history of psoriasis or inflammatory bowel disease. Negative SI joint x-rays. Negative MRI of the SI joint. Flaring of her urticaria with NSAIDs. Steroid responsive in regard to her joints. Near resolution of joint stiffness and pain with methotrexate. NSAIDs are avoided because they cause flares of her urticaria.    1/23/2023: In the last 1 week she has had more pain at the PIPs > MCPs > left ankle.  No swelling of these joints.  Morning stiffness for about 1 hour.  Pain and stiffness is worse in the morning and improves with time and activity.  She attributes the increased joint pain to the weather.    4/21/2023: The prednisone course given in January 2023 was effective and resolved the inflammatory arthritis symptoms at the ankles, PIPs, and MCPs.  She then did well for a while but for the past 2 weeks has had right Achilles enthesitis pain that was evaluated by podiatry and she was placed on prednisone with improvement of the Achilles enthesitis, but no other improvement of her joints because they were already doing well.  She is also going to use a walking boot from podiatry.    7/21/2023: increased pain and stiffness of the knees in the morning that improves with walking around.  Knees bothered with going up and down stairs.  Typically sleeps with knees bent.  Knees without swelling, increased warmth, or overlying erythema.  Knees more symptomatic for about 1.5 months.  Ankles stiff in the morning; duration of about 1 hour, similar to the knees but resolves quicker; duration of 1.5 months.  Heel pain on the right; improved but not resolved; walking  boot with some improvement.    10/24/2023: Pain of the right second and third MCPs, left wrist, and both shoulders; pain is worse in the morning and improves with time and activity.  Morning stiffness for 2 hours; positive gelling phenomenon.  Still with pain at the right Achilles enthesis that is better with wearing supportive shoes that have adequate padding     1/30/2024: Significant improvement with addition of Humira.  No injection site issues.  Right Achilles enthesitis still mildly active, but possibly improved.  Other joint symptoms significantly improved.  Morning stiffness for about 30-60 minutes.  No joint swelling.  Arthritis does not limit daily activities.    5/20/2024: Currently doing well.  No joint pain or swelling.  No morning stiffness or gelling phenomenon.  States that she has felt well ever since starting Humira.  Has an eye exam for hydroxychloroquine toxicity monitoring scheduled in 2 weeks.    Today, 8/29/2024: RA controlled.  No joint pain or swelling.  No morning stiffness or gelling phenomenon.  Only with right Achilles tendon pain on occasion, typically only with overuse, and resolves with rest.  No swelling of the right Achilles enthesis.  No worsening arthritis symptoms since stopping hydroxychloroquine.    No fevers or chills.  No nausea or vomiting.  No blood in her stool.  No oral or nasal sores.  No chest pain/pressure, palpitations, or shortness of breath.  No cough.  No eye pain or redness.    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    ROS   12 point review of system was completed and negative except as noted in the HPI     Active Problem List     Patient Active Problem List   Diagnosis    Contraceptive management    Hyperhidrosis of axilla    CARDIOVASCULAR SCREENING; LDL GOAL LESS THAN 160    Generalized anxiety disorder    Moderate recurrent  major depression (H)    Chronic idiopathic urticaria    Hyperlipidemia with target LDL less than 130    Hypothyroidism, unspecified hypothyroidism type    Rosacea    Non morbid obesity due to excess calories    BRIDGETTE (obstructive sleep apnea)    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 factor (H)    High risk medication use    Obesity (BMI 35.0-39.9) with comorbidity (H)    Hemorrhagic condition, unspecified (H24)    Screening for cervical cancer     Past Medical History     Past Medical History:   Diagnosis Date    Arthritis 01/01/2013    Depressive disorder 01/01/2003    Peripheral autonomic neuropathy in disorders classified elsewhere(337.1)     Pure hypercholesterolemia     Thyroid disease 01/01/1995     Past Surgical History     Past Surgical History:   Procedure Laterality Date    CHOLECYSTECTOMY, LAPOROSCOPIC  2006    Cholecystectomy, Laparoscopic    SURGICAL HISTORY OF -   17 years old    Thyroid ablation    ZZC APPENDECTOMY  2002     Allergy     Allergies   Allergen Reactions    Macrobid [Nitrofuran Derivatives]     Paxil [Paroxetine]      Anxiety        Zoloft      Mood changes     Current Medication List     Current Outpatient Medications   Medication Sig Dispense Refill    adalimumab-ryvk (SIMLANDI, 2 PEN,) 40 MG/0.4ML auto-injector kit Inject 0.4 mLs (40 mg) subcutaneously every 14 days .  Hold for infection and seek medical attention. 1 each 1    buPROPion (WELLBUTRIN XL) 150 MG 24 hr tablet Take 3 tablets by mouth once daily. 270 tablet 0    busPIRone (BUSPAR) 15 MG tablet Take 1 tablet by mouth twice daily. 180 tablet 2    cetirizine (ZYRTEC) 10 MG tablet Take 20 mg by mouth daily.      diclofenac (VOLTAREN) 1 % GEL topical gel Apply 2 grams to hands four times daily using enclosed dosing card. 100 g 3    folic acid (FOLVITE) 1 MG tablet Take 1 tablet (1 mg) by mouth daily 90 tablet 2    levonorgestrel-ethinyl estradiol (SEASONALE) 0.15-0.03 MG tablet Take 1 tablet by mouth  "daily.  Please schedule follow up visit, can be virtual. 91 tablet 1    levothyroxine (SYNTHROID/LEVOTHROID) 175 MCG tablet Take 1 tablet by mouth daily. 90 tablet 3    methotrexate 2.5 MG tablet Take 6 tablets (15 mg) by mouth once a week . Take all 6 tablets on the same day of each week. 78 tablet 2    Multiple Vitamin (MULTIVITAMIN) per tablet Take 1 tablet by mouth daily. 100 tablet 12    sulfaSALAzine ER (AZULFIDINE EN) 500 MG EC tablet Take 2 tablets (1,000 mg) by mouth 2 times daily 360 tablet 2    traZODone (DESYREL) 50 MG tablet Take 1 to 2 tablets by mouth nightly at bedtime. **Provider visit due for further refills** 90 tablet 0    ALPRAZolam (XANAX) 0.5 MG tablet Take 1 tablet (0.5 mg) by mouth 3 times daily as needed for anxiety (Patient not taking: Reported on 12/27/2023) 20 tablet 0    order for DME Equipment being ordered: CPAP 6-10 cm       No current facility-administered medications for this visit.         Social History   See HPI    Family History     Family History   Problem Relation Age of Onset    Diabetes Mother     Hypertension Mother     Lipids Mother     Hyperlipidemia Mother     Hypertension Father     Lipids Father     Thyroid Disease Father     Cancer Father     Hyperlipidemia Father     Other Cancer Father         Gastric & Bone Cancer    Diabetes Maternal Grandmother     Diabetes Paternal Grandmother     Melanoma No family hx of        Physical Exam     Temp Readings from Last 3 Encounters:   01/30/24 98.3  F (36.8  C) (Tympanic)   12/27/23 98  F (36.7  C) (Tympanic)   04/21/23 98.6  F (37  C) (Tympanic)     BP Readings from Last 5 Encounters:   08/29/24 133/71   05/20/24 119/82   01/30/24 137/84   01/30/24 113/80   12/27/23 122/86     Pulse Readings from Last 1 Encounters:   08/29/24 66     Resp Readings from Last 1 Encounters:   08/29/24 18     Estimated body mass index is 38.45 kg/m  as calculated from the following:    Height as of 1/30/24: 1.778 m (5' 10\").    Weight as of this " encounter: 121.6 kg (268 lb).    GEN: NAD  HEENT:  Anicteric, noninjected sclera. No obvious external lesions of the ear and nose. Hearing intact.  CV: S1, S2. RRR. No m/r/g  PULM: No increased work of breathing. CTA bilaterally   MSK: MCPs, PIPs, DIPs without swelling or tenderness to palpation.  Wrists without swelling or tenderness to palpation.  Elbows and shoulders without swelling or tenderness to palpation. Knees, ankles, and MTPs without swelling or tenderness to palpation.   SKIN: No rash or jaundice seen  PSYCH: Alert. Appropriate.      Labs / Imaging (select studies)     CBC  Recent Labs   Lab Test 08/27/24  1531 05/14/24  1606 01/23/24  1602 10/24/21  1057 06/24/21  0721 12/16/20  1826 11/16/20  1514 10/15/20  1434   WBC 8.8 6.8 7.1   < > 6.8 6.6   < > 7.0   RBC 4.15 4.16 4.16   < > 4.17 4.05   < > 3.84   HGB 13.3 13.0 13.1   < > 13.1 13.0   < > 12.2   HCT 39.2 38.6 39.2   < > 38.1 38.3   < > 35.8   MCV 95 93 94   < > 91 95   < > 93   RDW 14.3 14.1 14.0   < > 13.1 12.9   < > 12.3    189 173   < > 182 175   < > 170   MCH 32.0 31.3 31.5   < > 31.4 32.1   < > 31.8   MCHC 33.9 33.7 33.4   < > 34.4 33.9   < > 34.1   NEUTROPHIL 47 35 36   < > 49.0 45.7  --  44.7   LYMPH 37 48 50   < > 40.5 43.1  --  44.8   MONOCYTE 9 11 11   < > 8.3 10.4  --  10.0   EOSINOPHIL 7 5 3   < > 1.8 0.3  --  0.1   BASOPHIL 1 0 1   < > 0.4 0.5  --  0.4   ANEU  --   --   --   --  3.3 3.0  --  3.1   ALYM  --   --   --   --  2.7 2.8  --  3.1   FREDA  --   --   --   --  0.6 0.7  --  0.7   AEOS  --   --   --   --  0.1 0.0  --  0.0   ABAS  --   --   --   --  0.0 0.0  --  0.0   ANEUTAUTO 4.1 2.4 2.5   < >  --   --   --   --    ALYMPAUTO 3.3 3.2 3.5   < >  --   --   --   --    AMONOAUTO 0.8 0.7 0.8   < >  --   --   --   --    AEOSAUTO 0.6 0.4 0.2   < >  --   --   --   --    ABSBASO 0.1 0.0 0.1   < >  --   --   --   --     < > = values in this interval not displayed.     [facility]  Recent Labs   Lab Test 08/27/24  1531 05/14/24  0580  01/23/24  1602 12/27/23  0739 10/24/21  1057 06/24/21  0721 12/16/20  1826 10/15/20  1434 12/17/19  1640 10/19/19  1041 12/10/18  1900 09/23/18  1058 01/26/17  0745 12/20/16  1138   NA  --   --   --   --   --   --   --   --   --   --   --   --   --  138   POTASSIUM  --   --   --   --   --   --   --   --   --   --   --   --   --  4.4   CHLORIDE  --   --   --   --   --   --   --   --   --   --   --   --   --  104   CO2  --   --   --   --   --   --   --   --   --   --   --   --   --  30   ANIONGAP  --   --   --   --   --   --   --   --   --   --   --   --   --  4   GLC  --   --   --  88  --   --   --   --   --  81  --  79  --  84   BUN  --   --   --   --   --   --   --   --   --   --   --   --   --  13   CR 1.10* 0.98* 0.95  --    < > 1.03 0.99 0.97   < >  --    < >  --    < > 1.05*   GFRESTIMATED 64 73 76  --    < > 68 71 73   < >  --    < >  --    < > 59*   GFRESTBLACK  --   --   --   --   --  79 83 85   < >  --    < >  --    < > 72   TATI  --   --   --   --   --   --   --   --   --   --   --   --   --  8.7   BILITOTAL 0.4 0.3 0.4  --    < > 0.6 0.3 0.4   < >  --    < >  --    < >  --    ALBUMIN 4.2 4.0 4.2  --    < > 3.3* 3.8 3.5   < >  --    < >  --    < >  --    PROTTOTAL 7.1 7.0 7.2  --    < > 6.8 7.3 7.2   < >  --    < >  --    < >  --    ALKPHOS 45 49 43  --    < > 47 57 45   < >  --    < >  --    < >  --    AST 24 29 23  --    < > 17 16 19   < >  --    < >  --    < >  --    ALT 22 23 18  --    < > 21 23 22   < >  --    < >  --    < >  --     < > = values in this interval not displayed.     Calcium/VitaminD  Recent Labs   Lab Test 01/30/24  1105 12/20/16  1138 09/20/16  1554   TATI  --  8.7  --    VITDT 17*  --  36     ESR/CRP  Recent Labs   Lab Test 08/27/24  1531 05/14/24  1606 01/23/24  1602 01/19/23  1615 07/22/22  1528 01/25/22  1508 10/24/21  1057   SED 7 8 7   < > 8 7 7   CRP  --   --   --   --  7.8 8.6* 6.7   CRPI 5.33* 10.34* 5.60*   < >  --   --   --     < > = values in this interval not displayed.      Lipid Panel  Recent Labs   Lab Test 12/27/23  0739 10/19/19  1041 09/23/18  1058   CHOL 160 187 166   TRIG 160* 80 91   HDL 52 58 54   LDL 76 113* 94   NHDL 108 129 112     Hepatitis B  Recent Labs   Lab Test 07/18/23  1527   HBCAB Nonreactive   HEPBANG Nonreactive     Hepatitis C  Recent Labs   Lab Test 07/18/23  1527   HCVAB Nonreactive     Lyme ab screening  Recent Labs   Lab Test 10/24/23  0827   LYMEGM 0.11     Tuberculosis Screening  Recent Labs   Lab Test 10/24/23  0827   TBRES Negative     HIV Screening  Recent Labs   Lab Test 01/23/24  1602 10/24/23  0827   HIAGAB Nonreactive Nonreactive         --------------------------------------------------    From care everywhere:      Immunization History     Immunization History   Administered Date(s) Administered    COVID-19 Monovalent 18+ (Moderna) 08/18/2021, 09/15/2021    Flu, Unspecified 10/22/2013, 10/03/2014    [id number] Novel Flu- Nasal 10/21/2009    HepB 07/22/1999    Hepatitis B, Adult 07/22/1999, 08/25/1999, 01/28/2000    Historical DTP/aP 1981, 1980, 1980, 11/02/1982    Influenza (IIV3) PF 11/05/1996, 10/19/2010, 10/31/2019    Influenza Vaccine >6 months,quad, PF 10/18/2018, 10/31/2019, 10/29/2020, 10/14/2021, 10/19/2022    Influenza, seasonal, injectable, PF 10/30/2006, 11/02/2007, 10/21/2008, 10/12/2009    MMR 09/07/1992, 08/05/1993    Mantoux Tuberculin Skin Test 02/03/2012, 04/04/2012, 09/30/2014, 10/07/2014    OPV, trivalent, live 1980, 1980, 1980, 11/02/1982    Pneumo Conj 13-V (2010&after) 09/20/2016    Pneumococcal 23 valent 12/20/2016    TD,PF 7+ (Tenivac) 07/14/1995, 01/17/2006    TDAP (Adacel,Boostrix) 12/10/2013, 12/27/2023            Chart documentation done in part with Dragon Voice recognition Software. Although reviewed after completion, some word and grammatical error may remain.      Andrea Benítez MD

## 2024-08-29 NOTE — PATIENT INSTRUCTIONS
RHEUMATOLOGY    [facility]  [address] [place] [address]  [place] [address]    Phone number: [phone]  Fax number: [phone]    If you need a medication refill, please contact us as you may need lab work and/or a follow up visit prior to your refill.      Thank you for choosing [facility]!    Nicolette Sarmiento CMA Rheumatology

## 2024-11-13 DIAGNOSIS — F33.1 MODERATE RECURRENT MAJOR DEPRESSION (H): ICD-10-CM

## 2024-11-15 RX ORDER — BUPROPION HYDROCHLORIDE 150 MG/1
TABLET ORAL
Qty: 270 TABLET | Refills: 0 | Status: SHIPPED | OUTPATIENT
Start: 2024-11-15

## 2024-11-15 NOTE — TELEPHONE ENCOUNTER
Last PHQ9 was WNL, pt has an upcoming appt with PCP on 12//5/24. Mary refill provided.    Autumn Lucas RN  [facility]

## 2024-11-27 ENCOUNTER — PATIENT OUTREACH (OUTPATIENT)
Dept: CARE COORDINATION | Facility: CLINIC | Age: 44
End: 2024-11-27
Payer: COMMERCIAL

## 2024-12-01 ENCOUNTER — LAB (OUTPATIENT)
Dept: LAB | Facility: CLINIC | Age: 44
End: 2024-12-01
Payer: COMMERCIAL

## 2024-12-01 DIAGNOSIS — M06.09 RHEUMATOID ARTHRITIS OF MULTIPLE SITES WITH NEGATIVE RHEUMATOID FACTOR (H): ICD-10-CM

## 2024-12-01 DIAGNOSIS — Z79.899 HIGH RISK MEDICATION USE: ICD-10-CM

## 2024-12-01 LAB
ALBUMIN SERPL BCG-MCNC: 4.1 G/DL (ref 3.5–5.2)
ALP SERPL-CCNC: 40 U/L (ref 40–150)
ALT SERPL W P-5'-P-CCNC: 19 U/L (ref 0–50)
AST SERPL W P-5'-P-CCNC: 26 U/L (ref 0–45)
BASOPHILS # BLD AUTO: 0.1 10E3/UL (ref 0–0.2)
BASOPHILS NFR BLD AUTO: 1 %
BILIRUB DIRECT SERPL-MCNC: <0.2 MG/DL (ref 0–0.3)
BILIRUB SERPL-MCNC: 0.5 MG/DL
CREAT SERPL-MCNC: 1.01 MG/DL (ref 0.51–0.95)
CRP SERPL-MCNC: 4.49 MG/L
EGFRCR SERPLBLD CKD-EPI 2021: 70 ML/MIN/1.73M2
EOSINOPHIL # BLD AUTO: 0.7 10E3/UL (ref 0–0.7)
EOSINOPHIL NFR BLD AUTO: 9 %
ERYTHROCYTE [DISTWIDTH] IN BLOOD BY AUTOMATED COUNT: 14.4 % (ref 10–15)
ERYTHROCYTE [SEDIMENTATION RATE] IN BLOOD BY WESTERGREN METHOD: 6 MM/HR (ref 0–20)
HCT VFR BLD AUTO: 37.7 % (ref 35–47)
HGB BLD-MCNC: 12.8 G/DL (ref 11.7–15.7)
IMM GRANULOCYTES # BLD: 0 10E3/UL
IMM GRANULOCYTES NFR BLD: 0 %
LYMPHOCYTES # BLD AUTO: 3.6 10E3/UL (ref 0.8–5.3)
LYMPHOCYTES NFR BLD AUTO: 48 %
MCH RBC QN AUTO: 32.2 PG (ref 26.5–33)
MCHC RBC AUTO-ENTMCNC: 34 G/DL (ref 31.5–36.5)
MCV RBC AUTO: 95 FL (ref 78–100)
MONOCYTES # BLD AUTO: 0.7 10E3/UL (ref 0–1.3)
MONOCYTES NFR BLD AUTO: 9 %
NEUTROPHILS # BLD AUTO: 2.5 10E3/UL (ref 1.6–8.3)
NEUTROPHILS NFR BLD AUTO: 34 %
PLATELET # BLD AUTO: 166 10E3/UL (ref 150–450)
PROT SERPL-MCNC: 6.8 G/DL (ref 6.4–8.3)
RBC # BLD AUTO: 3.98 10E6/UL (ref 3.8–5.2)
WBC # BLD AUTO: 7.4 10E3/UL (ref 4–11)

## 2024-12-01 PROCEDURE — 82565 ASSAY OF CREATININE: CPT

## 2024-12-01 PROCEDURE — 80076 HEPATIC FUNCTION PANEL: CPT

## 2024-12-01 PROCEDURE — 85025 COMPLETE CBC W/AUTO DIFF WBC: CPT

## 2024-12-01 PROCEDURE — 36415 COLL VENOUS BLD VENIPUNCTURE: CPT

## 2024-12-01 PROCEDURE — 85652 RBC SED RATE AUTOMATED: CPT

## 2024-12-01 PROCEDURE — 86140 C-REACTIVE PROTEIN: CPT

## 2024-12-05 ENCOUNTER — OFFICE VISIT (OUTPATIENT)
Dept: RHEUMATOLOGY | Facility: CLINIC | Age: 44
End: 2024-12-05
Payer: COMMERCIAL

## 2024-12-05 VITALS
SYSTOLIC BLOOD PRESSURE: 138 MMHG | WEIGHT: 271 LBS | HEART RATE: 90 BPM | BODY MASS INDEX: 38.88 KG/M2 | DIASTOLIC BLOOD PRESSURE: 84 MMHG | OXYGEN SATURATION: 98 %

## 2024-12-05 DIAGNOSIS — Z79.899 HIGH RISK MEDICATION USE: ICD-10-CM

## 2024-12-05 DIAGNOSIS — E55.9 VITAMIN D DEFICIENCY: ICD-10-CM

## 2024-12-05 DIAGNOSIS — M75.42 IMPINGEMENT SYNDROME OF LEFT SHOULDER: ICD-10-CM

## 2024-12-05 DIAGNOSIS — M06.09 RHEUMATOID ARTHRITIS OF MULTIPLE SITES WITH NEGATIVE RHEUMATOID FACTOR (H): Primary | ICD-10-CM

## 2024-12-05 RX ORDER — SULFASALAZINE 500 MG/1
500 TABLET, DELAYED RELEASE ORAL 2 TIMES DAILY
Qty: 180 TABLET | Refills: 1 | Status: SHIPPED | OUTPATIENT
Start: 2024-12-05

## 2024-12-05 RX ORDER — FAMOTIDINE 20 MG
25 TABLET ORAL DAILY
Qty: 90 CAPSULE | Refills: 2 | Status: SHIPPED | OUTPATIENT
Start: 2024-12-05

## 2024-12-05 NOTE — PROGRESS NOTES
Rheumatology Clinic Visit      Vida Whitfield MRN# [id number]   YOB: 1980 Age: 44 year old      Date of visit: 12/05/24   PCP: Dr. Fco Medeiros  Ophthalmology: [facility] in [place], [place]    Chief Complaint   Patient presents with:  RECHECK    Assessment and Plan     1. Seronegative nonerosive rheumatoid arthritis: Currently on methotrexate 15 mg once weekly (Cr elevation possibly secondary to MTX so MTX was reduced with improvement of Cr), folic acid 1 mg daily, sulfasalazine 500 mg twice daily, and adalimumab (started Humira 10/26/2023, effective; changed to Simlandi per insurance requirement in 2024).  Previously on hydroxychloroquine (discontinued when doing well on a combination of methotrexate/sulfasalazine/adalimumab).  Significant improvement since starting adalimumab so started reducing medications, first by stopping hydroxychloroquine and later by reducing sulfasalazine.  Today she has symptoms of left shoulder impingement syndrome so we will hold off on discontinuing sulfasalazine.  She is doing well with regard to rheumatoid arthritis overall but cannot exclude shoulder symptoms being related to dose reduction so we will hold off on reducing further at this time.  If doing well follow-up consider stopping sulfasalazine    chronic illness, stable.    - Continue methotrexate 15mg once weekly  - Continue folic acid 1 mg daily  - Continue sulfasalazine 500 mg twice daily   - Continue Simlandi (adalimumab-RYVK) 40 mg SQ every 14 days   - Avoid oral NSAIDs because they are historically triggers for urticaria and because of creatinine elevation; tolerates voltaren gel  - Labs in 3 months: CBC, Creatinine, Hepatic Panel, ESR, CRP    High risk medication requiring intensive toxicity monitoring at least quarterly.     # On birth control per patient    2.  History of bilateral patellofemoral syndrome: Previous steroid injections have been effective; repeat injection not needed at this time.   Asymptomatic at this time    3.  Impingement syndrome of the left shoulder: 1 month duration.  Reviewed the diagnosis and treatment options.  She would like to try topical Voltaren gel, rest, ice, and if not improving then consider physical therapy and if still not improving then consider steroid injection.  Physical therapy referral given today in case it is needed.    4. Urticaria: Historically, oral NSAIDs are triggers.  Documented here for clinical significance only.  Not managed in this clinic.    5. Bone health: vitamin D 1000 IU daily; recheck lab  - Continue vitamin D 1000 IU daily (does not take regularly but she says if it is a prescription then it may help compliance so vitamin D was prescribed today)  - Lab in 3 months: Vitamin D    6.  Vaccinations: Vaccinations reviewed with Ms. Whitfield.     - Influenza: up to date per patient  - [id number]: up to date  - [id number]: up to date  - [id number]: Anticipate receiving 2026  - COVID-19: Up to date; additional vaccinations refused by patient    Total minutes spent in evaluation with patient, documentation, , and review of pertinent studies and chart notes: 18  The longitudinal plan of care for the rheumatology problem(s) were addressed during this visit.  Due to added complexity of care, we will continue to support the patient and the subsequent management of this condition with ongoing continuity of care.      Ms. Whitfield verbalized agreement with and understanding of the rational for the diagnosis and treatment plan.  All questions were answered to best of my ability and the patient's satisfaction. Ms. Whitfield was advised to contact the clinic with any questions that may arise after the clinic visit.      Thank you for involving me in the care of the patient    Return to clinic: 3 months    HPI   Vida KEV Whitfield is a 44 year old female with a medical history significant for anxiety, depression, chronic idiopathic urticaria, hyperlipidemia, hypothyroidism,  rosacea, obstructive sleep apnea, and inflammatory arthritis who presents for follow-up of rheumatoid arthritis.    Historical records in this paragraph: She was previously evaluated by Dr. Blaine Anthony at [facility].  2014 labs show negative: hepatitis C ab, HBV surface ag, HBV core ab, HLA-B27, Sm, RNP, SSA, SSB, Scl-70, DNA, cardiolipin, CCP, PR3, MPO. NIKKIE 1:160 homogeneous.  MPO also positive (one negative and one positive value). Per a 3/11/2016 clinic note, she has seronegative rheumatoid arthritis and chronic urticaria. She developed joint swelling and stiffness in the bilateral MCPs, MTPs, ankles, and knees that started in November 2013 shortly after tapering off prednisone that was used to manage chronic urticaria. No history of psoriasis or inflammatory bowel disease. Negative SI joint x-rays. Negative MRI of the SI joint. Flaring of her urticaria with NSAIDs. Steroid responsive in regard to her joints. Near resolution of joint stiffness and pain with methotrexate. NSAIDs are avoided because they cause flares of her urticaria.    1/23/2023: In the last 1 week she has had more pain at the PIPs > MCPs > left ankle.  No swelling of these joints.  Morning stiffness for about 1 hour.  Pain and stiffness is worse in the morning and improves with time and activity.  She attributes the increased joint pain to the weather.    4/21/2023: The prednisone course given in January 2023 was effective and resolved the inflammatory arthritis symptoms at the ankles, PIPs, and MCPs.  She then did well for a while but for the past 2 weeks has had right Achilles enthesitis pain that was evaluated by podiatry and she was placed on prednisone with improvement of the Achilles enthesitis, but no other improvement of her joints because they were already doing well.  She is also going to use a walking boot from podiatry.    7/21/2023: increased pain and stiffness of the knees in the morning that improves with walking around.   Knees bothered with going up and down stairs.  Typically sleeps with knees bent.  Knees without swelling, increased warmth, or overlying erythema.  Knees more symptomatic for about 1.5 months.  Ankles stiff in the morning; duration of about 1 hour, similar to the knees but resolves quicker; duration of 1.5 months.  Heel pain on the right; improved but not resolved; walking boot with some improvement.    10/24/2023: Pain of the right second and third MCPs, left wrist, and both shoulders; pain is worse in the morning and improves with time and activity.  Morning stiffness for 2 hours; positive gelling phenomenon.  Still with pain at the right Achilles enthesis that is better with wearing supportive shoes that have adequate padding     1/30/2024: Significant improvement with addition of Humira.  No injection site issues.  Right Achilles enthesitis still mildly active, but possibly improved.  Other joint symptoms significantly improved.  Morning stiffness for about 30-60 minutes.  No joint swelling.  Arthritis does not limit daily activities.    5/20/2024: Currently doing well.  No joint pain or swelling.  No morning stiffness or gelling phenomenon.  States that she has felt well ever since starting Humira.  Has an eye exam for hydroxychloroquine toxicity monitoring scheduled in 2 weeks.    8/29/2024: RA controlled.  No joint pain or swelling.  No morning stiffness or gelling phenomenon.  Only with right Achilles tendon pain on occasion, typically only with overuse, and resolves with rest.  No swelling of the right Achilles enthesis.  No worsening arthritis symptoms since stopping hydroxychloroquine.    Today, 12/5/2024: No worsening arthritis symptoms with sulfasalazine dose reduction until 1 month ago when she developed left shoulder pain that is worse with abduction above 90 degrees but any over the head activity worsens her shoulder symptoms.  No other joint pain.  No morning stiffness or joint phenomenon.    No  fevers or chills.  No nausea or vomiting.  No blood in her stool.  No oral or nasal sores.  No chest pain/pressure, palpitations, or shortness of breath.  No cough.  No eye pain or redness.    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  Occasionally works with a chiropractor to draw blood for PRP injections    ROS   12 point review of system was completed and negative except as noted in the HPI     Active Problem List     Patient Active Problem List   Diagnosis    Contraceptive management    Hyperhidrosis of axilla    CARDIOVASCULAR SCREENING; LDL GOAL LESS THAN 160    Generalized anxiety disorder    Moderate recurrent major depression (H)    Chronic idiopathic urticaria    Hyperlipidemia with target LDL less than 130    Hypothyroidism, unspecified hypothyroidism type    Rosacea    Non morbid obesity due to excess calories    BRIDGETTE (obstructive sleep apnea)    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 factor (H)    High risk medication use    Obesity (BMI 35.0-39.9) with comorbidity (H)    Hemorrhagic condition, unspecified (H)    Screening for cervical cancer     Past Medical History     Past Medical History:   Diagnosis Date    Arthritis 01/01/2013    Depressive disorder 01/01/2003    Peripheral autonomic neuropathy in disorders classified elsewhere(337.1)     Pure hypercholesterolemia     Thyroid disease 01/01/1995     Past Surgical History     Past Surgical History:   Procedure Laterality Date    CHOLECYSTECTOMY, LAPOROSCOPIC  2006    Cholecystectomy, Laparoscopic    SURGICAL HISTORY OF -   17 years old    Thyroid ablation    ZZC APPENDECTOMY  2002     Allergy     Allergies   Allergen Reactions    Macrobid [Nitrofuran Derivatives]     Paxil [Paroxetine]      Anxiety        Zoloft      Mood changes     Current Medication List     Current Outpatient Medications    Medication Sig Dispense Refill    adalimumab-ryvk (SIMLANDI, 2 PEN,) 40 MG/0.4ML auto-injector kit Inject 0.4 mLs (40 mg) subcutaneously every 14 days. Hold for infection and seek medical attention. 0.8 mL 9    ALPRAZolam (XANAX) 0.5 MG tablet Take 1 tablet (0.5 mg) by mouth 3 times daily as needed for anxiety (Patient not taking: Reported on 12/27/2023) 20 tablet 0    buPROPion (WELLBUTRIN XL) 150 MG 24 hr tablet Take 3 tablets by mouth once daily. 270 tablet 0    busPIRone (BUSPAR) 15 MG tablet Take 1 tablet by mouth twice daily. 180 tablet 2    cetirizine (ZYRTEC) 10 MG tablet Take 20 mg by mouth daily.      diclofenac (VOLTAREN) 1 % GEL topical gel Apply 2 grams to hands four times daily using enclosed dosing card. 100 g 3    folic acid (FOLVITE) 1 MG tablet Take 1 tablet (1 mg) by mouth daily. 90 tablet 2    levonorgestrel-ethinyl estradiol (SEASONALE) 0.15-0.03 MG tablet Take 1 tablet by mouth daily.  Please schedule follow up visit, can be virtual. 91 tablet 1    levothyroxine (SYNTHROID/LEVOTHROID) 175 MCG tablet Take 1 tablet by mouth daily. 90 tablet 3    methotrexate 2.5 MG tablet Take 6 tablets (15 mg) by mouth once a week. . Take all 6 tablets on the same day of each week. 78 tablet 2    Multiple Vitamin (MULTIVITAMIN) per tablet Take 1 tablet by mouth daily. 100 tablet 12    order for DME Equipment being ordered: CPAP 6-10 cm      sulfaSALAzine ER (AZULFIDINE EN) 500 MG EC tablet Take 1 tablet (500 mg) by mouth 2 times daily. 180 tablet 1    traZODone (DESYREL) 50 MG tablet Take 1 to 2 tablets by mouth nightly at bedtime. **Provider visit due for further refills** 90 tablet 0     No current facility-administered medications for this visit.         Social History   See HPI    Family History     Family History   Problem Relation Age of Onset    Diabetes Mother     Hypertension Mother     Lipids Mother     Hyperlipidemia Mother     Hypertension Father     Lipids Father     Thyroid Disease Father      "Cancer Father     Hyperlipidemia Father     Other Cancer Father         Gastric & Bone Cancer    Diabetes Maternal Grandmother     Diabetes Paternal Grandmother     Melanoma No family hx of        Physical Exam     Temp Readings from Last 3 Encounters:   01/30/24 98.3  F (36.8  C) (Tympanic)   12/27/23 98  F (36.7  C) (Tympanic)   04/21/23 98.6  F (37  C) (Tympanic)     BP Readings from Last 5 Encounters:   12/05/24 138/84   08/29/24 133/71   05/20/24 119/82   01/30/24 137/84   01/30/24 113/80     Pulse Readings from Last 1 Encounters:   12/05/24 90     Resp Readings from Last 1 Encounters:   08/29/24 18     Estimated body mass index is 38.88 kg/m  as calculated from the following:    Height as of 1/30/24: 1.778 m (5' 10\").    Weight as of this encounter: 122.9 kg (271 lb).    GEN: NAD  HEENT:  Anicteric, noninjected sclera. No obvious external lesions of the ear and nose. Hearing intact.  CV: S1, S2. RRR. No m/r/g  PULM: No increased work of breathing. CTA bilaterally   MSK: MCPs, PIPs, DIPs without swelling or tenderness to palpation.  Wrists without swelling or tenderness to palpation.  Elbows and shoulders without swelling or tenderness to palpation. Knees, ankles, and MTPs without swelling or tenderness to palpation.   SKIN: No rash or jaundice seen  PSYCH: Alert. Appropriate.      Labs / Imaging (select studies)     RF/CCP  No results for input(s): \"CCPABY\", \"CCPIGG\", \"CYCLICCITPEP\", \"RHF\" in the last 83450 hours.  CBC  Recent Labs   Lab Test 12/01/24  1127 08/27/24  1531 05/14/24  1606 10/24/21  1057 06/24/21  0721 12/16/20  1826 11/16/20  1514 10/15/20  1434   WBC 7.4 8.8 6.8   < > 6.8 6.6   < > 7.0   RBC 3.98 4.15 4.16   < > 4.17 4.05   < > 3.84   HGB 12.8 13.3 13.0   < > 13.1 13.0   < > 12.2   HCT 37.7 39.2 38.6   < > 38.1 38.3   < > 35.8   MCV 95 95 93   < > 91 95   < > 93   RDW 14.4 14.3 14.1   < > 13.1 12.9   < > 12.3    171 189   < > 182 175   < > 170   MCH 32.2 32.0 31.3   < > 31.4 32.1   < > " 31.8   MCHC 34.0 33.9 33.7   < > 34.4 33.9   < > 34.1   NEUTROPHIL 34 47 35   < > 49.0 45.7  --  44.7   LYMPH 48 37 48   < > 40.5 43.1  --  44.8   MONOCYTE 9 9 11   < > 8.3 10.4  --  10.0   EOSINOPHIL 9 7 5   < > 1.8 0.3  --  0.1   BASOPHIL 1 1 0   < > 0.4 0.5  --  0.4   ANEU  --   --   --   --  3.3 3.0  --  3.1   ALYM  --   --   --   --  2.7 2.8  --  3.1   FREDA  --   --   --   --  0.6 0.7  --  0.7   AEOS  --   --   --   --  0.1 0.0  --  0.0   ABAS  --   --   --   --  0.0 0.0  --  0.0   ANEUTAUTO 2.5 4.1 2.4   < >  --   --   --   --    ALYMPAUTO 3.6 3.3 3.2   < >  --   --   --   --    AMONOAUTO 0.7 0.8 0.7   < >  --   --   --   --    AEOSAUTO 0.7 0.6 0.4   < >  --   --   --   --    ABSBASO 0.1 0.1 0.0   < >  --   --   --   --     < > = values in this interval not displayed.     CMP  Recent Labs   Lab Test 12/01/24  1127 08/27/24  1531 05/14/24  1606 01/23/24  1602 12/27/23  0739 10/24/21  1057 06/24/21  0721 12/16/20  1826 10/15/20  1434 12/17/19  1640 10/19/19  1041 12/10/18  1900 09/23/18  1058 01/26/17  0745 12/20/16  1138   NA  --   --   --   --   --   --   --   --   --   --   --   --   --   --  138   POTASSIUM  --   --   --   --   --   --   --   --   --   --   --   --   --   --  4.4   CHLORIDE  --   --   --   --   --   --   --   --   --   --   --   --   --   --  104   CO2  --   --   --   --   --   --   --   --   --   --   --   --   --   --  30   ANIONGAP  --   --   --   --   --   --   --   --   --   --   --   --   --   --  4   GLC  --   --   --   --  88  --   --   --   --   --  81  --  79  --  84   BUN  --   --   --   --   --   --   --   --   --   --   --   --   --   --  13   CR 1.01* 1.10* 0.98*   < >  --    < > 1.03 0.99 0.97   < >  --    < >  --    < > 1.05*   GFRESTIMATED 70 64 73   < >  --    < > 68 71 73   < >  --    < >  --    < > 59*   GFRESTBLACK  --   --   --   --   --   --  79 83 85   < >  --    < >  --    < > 72   TATI  --   --   --   --   --   --   --   --   --   --   --   --   --   --  8.7    BILITOTAL 0.5 0.4 0.3   < >  --    < > 0.6 0.3 0.4   < >  --    < >  --    < >  --    ALBUMIN 4.1 4.2 4.0   < >  --    < > 3.3* 3.8 3.5   < >  --    < >  --    < >  --    PROTTOTAL 6.8 7.1 7.0   < >  --    < > 6.8 7.3 7.2   < >  --    < >  --    < >  --    ALKPHOS 40 45 49   < >  --    < > 47 57 45   < >  --    < >  --    < >  --    AST 26 24 29   < >  --    < > 17 16 19   < >  --    < >  --    < >  --    ALT 19 22 23   < >  --    < > 21 23 22   < >  --    < >  --    < >  --     < > = values in this interval not displayed.     Calcium/VitaminD  Recent Labs   Lab Test 01/30/24  1105 12/20/16  1138 09/20/16  1554   TATI  --  8.7  --    VITDT 17*  --  36     ESR/CRP  Recent Labs   Lab Test 12/01/24  1127 08/27/24  1531 05/14/24  1606 01/19/23  1615 07/22/22  1528 01/25/22  1508 10/24/21  1057   SED 6 7 8   < > 8 7 7   CRP  --   --   --   --  7.8 8.6* 6.7   CRPI 4.49 5.33* 10.34*   < >  --   --   --     < > = values in this interval not displayed.     Lipid Panel  Recent Labs   Lab Test 12/27/23  0739 10/19/19  1041 09/23/18  1058   CHOL 160 187 166   TRIG 160* 80 91   HDL 52 58 54   LDL 76 113* 94   NHDL 108 129 112     Hepatitis B  Recent Labs   Lab Test 07/18/23  1527   HBCAB Nonreactive   HEPBANG Nonreactive     Hepatitis C  Recent Labs   Lab Test 07/18/23  1527   HCVAB Nonreactive     Lyme ab screening  Recent Labs   Lab Test 10/24/23  0827   LYMEGM 0.11     Tuberculosis Screening  Recent Labs   Lab Test 10/24/23  0827   TBRES Negative     HIV Screening  Recent Labs   Lab Test 01/23/24  1602 10/24/23  0827   HIAGAB Nonreactive Nonreactive       --------------------------------------------------    From care everywhere:      Immunization History     Immunization History   Administered Date(s) Administered    COVID-19 Monovalent 18+ (Moderna) 08/18/2021, 09/15/2021    Flu, Unspecified 10/22/2013, 10/03/2014    [id number] Novel Flu- Nasal 10/21/2009    HepB 07/22/1999    Hepatitis B, Adult 07/22/1999, 08/25/1999,  01/28/2000    Historical DTP/aP 1980, 1980, 1980, 11/02/1982    Influenza (IIV3) PF 11/05/1996, 10/19/2010, 10/31/2019    Influenza (prior to 2024) 10/30/2006, 11/02/2007, 10/21/2008, 10/12/2009    Influenza Vaccine >6 months,quad, PF 10/18/2018, 10/31/2019, 10/29/2020, 10/14/2021, 10/19/2022    MMR 09/07/1992, 08/05/1993    Mantoux Tuberculin Skin Test 02/03/2012, 04/04/2012, 09/30/2014, 10/07/2014    OPV, trivalent, live 1981, 1980, 1981, 11/02/1982    Pneumo Conj 13-V (2010&after) 09/20/2016    Pneumococcal 23 valent 12/20/2016    TD,PF 7+ (Tenivac) 07/14/1995, 01/17/2006    TDAP (Adacel,Boostrix) 12/10/2013, 12/27/2023            Chart documentation done in part with Dragon Voice recognition Software. Although reviewed after completion, some word and grammatical error may remain.      Andrea Benítez MD

## 2024-12-11 ENCOUNTER — PATIENT OUTREACH (OUTPATIENT)
Dept: CARE COORDINATION | Facility: CLINIC | Age: 44
End: 2024-12-11
Payer: COMMERCIAL

## 2024-12-16 ENCOUNTER — VIRTUAL VISIT (OUTPATIENT)
Dept: FAMILY MEDICINE | Facility: CLINIC | Age: 44
End: 2024-12-16
Payer: COMMERCIAL

## 2024-12-16 DIAGNOSIS — Z30.41 ENCOUNTER FOR SURVEILLANCE OF CONTRACEPTIVE PILLS: ICD-10-CM

## 2024-12-16 DIAGNOSIS — E03.9 HYPOTHYROIDISM, UNSPECIFIED TYPE: ICD-10-CM

## 2024-12-16 DIAGNOSIS — F33.1 MODERATE RECURRENT MAJOR DEPRESSION (H): ICD-10-CM

## 2024-12-16 PROCEDURE — 99214 OFFICE O/P EST MOD 30 MIN: CPT | Mod: 95 | Performed by: FAMILY MEDICINE

## 2024-12-16 RX ORDER — LEVOTHYROXINE SODIUM 175 UG/1
175 TABLET ORAL DAILY
Qty: 90 TABLET | Refills: 0 | Status: SHIPPED | OUTPATIENT
Start: 2024-12-16

## 2024-12-16 RX ORDER — BUPROPION HYDROCHLORIDE 150 MG/1
TABLET ORAL
Qty: 270 TABLET | Refills: 3 | Status: SHIPPED | OUTPATIENT
Start: 2024-12-16

## 2024-12-16 RX ORDER — LEVONORGESTREL AND ETHINYL ESTRADIOL 0.15-0.03
1 KIT ORAL DAILY
Qty: 91 TABLET | Refills: 3 | Status: SHIPPED | OUTPATIENT
Start: 2024-12-16

## 2024-12-16 RX ORDER — TRAZODONE HYDROCHLORIDE 50 MG/1
TABLET, FILM COATED ORAL
Qty: 90 TABLET | Refills: 3 | Status: SHIPPED | OUTPATIENT
Start: 2024-12-16

## 2024-12-16 RX ORDER — BUSPIRONE HYDROCHLORIDE 15 MG/1
TABLET ORAL
Qty: 180 TABLET | Refills: 3 | Status: SHIPPED | OUTPATIENT
Start: 2024-12-16

## 2024-12-16 ASSESSMENT — PATIENT HEALTH QUESTIONNAIRE - PHQ9
10. IF YOU CHECKED OFF ANY PROBLEMS, HOW DIFFICULT HAVE THESE PROBLEMS MADE IT FOR YOU TO DO YOUR WORK, TAKE CARE OF THINGS AT HOME, OR GET ALONG WITH OTHER PEOPLE: NOT DIFFICULT AT ALL
SUM OF ALL RESPONSES TO PHQ QUESTIONS 1-9: 0
SUM OF ALL RESPONSES TO PHQ QUESTIONS 1-9: 0

## 2024-12-16 NOTE — PROGRESS NOTES
"Vida is a 44 year old who is being evaluated via a billable video visit.    How would you like to obtain your AVS? MyChart  If the video visit is dropped, the invitation should be resent by: Text to cell phone: [phone]  Will anyone else be joining your video visit? No      Assessment & Plan   Problem List Items Addressed This Visit       Hypothyroidism, unspecified hypothyroidism type (Chronic)    Relevant Medications    levothyroxine (SYNTHROID/LEVOTHROID) 175 MCG tablet    Other Relevant Orders    TSH with free T4 reflex    Moderate recurrent major depression (H) (Chronic)    Relevant Medications    buPROPion (WELLBUTRIN XL) 150 MG 24 hr tablet    busPIRone (BUSPAR) 15 MG tablet    traZODone (DESYREL) 50 MG tablet    Contraceptive management    Relevant Medications    levonorgestrel-ethinyl estradiol (SEASONALE) 0.15-0.03 MG tablet        Patient is a 44 yr old female here for her medication refills  She has depression and anxiety and reports that her medications have helped.   Patient also has hypothyroidism. She is asked to come in for labs   No other complaints today.      BMI  Estimated body mass index is 38.88 kg/m  as calculated from the following:    Height as of 1/30/24: 1.778 m (5' 10\").    Weight as of 12/5/24: 122.9 kg (271 lb).       FUTURE APPOINTMENTS:       - Follow-up visit as needed      Sindy Mcpherson is a 44 year old, presenting for the following health issues:  No chief complaint on file.        12/16/2024     2:35 PM   Additional Questions   Roomed by Joanna Epstein   Accompanied by self         12/16/2024     2:35 PM   Patient Reported Additional Medications   Patient reports taking the following new medications none     History of Present Illness       She eats 0-1 servings of fruits and vegetables daily.She consumes 2 sweetened beverage(s) daily.She exercises with enough effort to increase her heart rate 10 to 19 minutes per day.  She exercises with enough effort to " increase her heart rate 3 or less days per week.   She is taking medications regularly.     Depression   How are you doing with your depression since your last visit? Improved   Are you having other symptoms that might be associated with depression? No  Have you had a significant life event?  No   Are you feeling anxious or having panic attacks?   No  Do you have any concerns with your use of alcohol or other drugs? No    Social History     Tobacco Use    Smoking status: Former     Current packs/day: 0.00     Types: Cigarettes     Start date: 1998     Quit date: 2001     Years since quittin.5    Smokeless tobacco: Never    Tobacco comments:     not a smoker   Vaping Use    Vaping status: Never Used   Substance Use Topics    Alcohol use: Yes     Comment: rare    Drug use: No         2023    10:39 AM 2023     6:56 AM 2024     8:53 AM   PHQ   PHQ-9 Total Score 1 3 0    Q9: Thoughts of better off dead/self-harm past 2 weeks Not at all  Not at all  Not at all        Patient-reported         2023    10:39 AM 2023     6:56 AM 2024     7:07 AM   JANET-7 SCORE   Total Score 0 (minimal anxiety) 2 (minimal anxiety) 2 (minimal anxiety)   Total Score 0 2 2        Patient-reported         2024     8:53 AM   Last PHQ-9   1.  Little interest or pleasure in doing things 0    2.  Feeling down, depressed, or hopeless 0    3.  Trouble falling or staying asleep, or sleeping too much 0    4.  Feeling tired or having little energy 0    5.  Poor appetite or overeating 0    6.  Feeling bad about yourself 0    7.  Trouble concentrating 0    8.  Moving slowly or restless 0    Q9: Thoughts of better off dead/self-harm past 2 weeks 0    PHQ-9 Total Score 0        Patient-reported       Suicide Assessment Five-step Evaluation and Treatment (SAFE-T)      Hypothyroidism Follow-up    Since last visit, patient describes the following symptoms: Weight stable, no hair loss, no skin changes, no  constipation, no loose stools      Medication Followup of Birth control  Taking Medication as prescribed: yes  Side Effects:  None  Medication Helping Symptoms:  yes        Review of Systems  Constitutional, HEENT, cardiovascular, pulmonary, gi and gu systems are negative, except as otherwise noted.      Objective           Vitals:  No vitals were obtained today due to virtual visit.    Physical Exam   GENERAL: alert and no distress  EYES: Eyes grossly normal to inspection.  No discharge or erythema, or obvious scleral/conjunctival abnormalities.  RESP: No audible wheeze, cough, or visible cyanosis.    SKIN: Visible skin clear. No significant rash, abnormal pigmentation or lesions.  NEURO: Cranial nerves grossly intact.  Mentation and speech appropriate for age.  PSYCH: Appropriate affect, tone, and pace of words          Video-Visit Details    Type of service:  Video Visit   Originating Location (pt. Location): Home  Distant Location (provider location):  On-site  Platform used for Video Visit: Fabiano  Signed Electronically by: Martina Jimenez MD

## 2024-12-19 ENCOUNTER — LAB (OUTPATIENT)
Dept: LAB | Facility: CLINIC | Age: 44
End: 2024-12-19
Payer: COMMERCIAL

## 2024-12-19 DIAGNOSIS — E03.9 HYPOTHYROIDISM, UNSPECIFIED TYPE: ICD-10-CM

## 2024-12-19 LAB — TSH SERPL DL<=0.005 MIU/L-ACNC: 1.38 UIU/ML (ref 0.3–4.2)

## 2025-02-01 ENCOUNTER — HEALTH MAINTENANCE LETTER (OUTPATIENT)
Age: 45
End: 2025-02-01

## 2025-03-09 ENCOUNTER — LAB (OUTPATIENT)
Dept: LAB | Facility: CLINIC | Age: 45
End: 2025-03-09
Payer: COMMERCIAL

## 2025-03-09 DIAGNOSIS — Z79.899 HIGH RISK MEDICATION USE: ICD-10-CM

## 2025-03-09 DIAGNOSIS — M06.09 RHEUMATOID ARTHRITIS OF MULTIPLE SITES WITH NEGATIVE RHEUMATOID FACTOR (H): ICD-10-CM

## 2025-03-09 DIAGNOSIS — E55.9 VITAMIN D DEFICIENCY: ICD-10-CM

## 2025-03-09 LAB
ALBUMIN SERPL BCG-MCNC: 4 G/DL (ref 3.5–5.2)
ALP SERPL-CCNC: 38 U/L (ref 40–150)
ALT SERPL W P-5'-P-CCNC: 20 U/L (ref 0–50)
AST SERPL W P-5'-P-CCNC: 26 U/L (ref 0–45)
BASOPHILS # BLD AUTO: 0 10E3/UL (ref 0–0.2)
BASOPHILS NFR BLD AUTO: 0 %
BILIRUB DIRECT SERPL-MCNC: 0.15 MG/DL (ref 0–0.3)
BILIRUB SERPL-MCNC: 0.4 MG/DL
CREAT SERPL-MCNC: 1.12 MG/DL (ref 0.51–0.95)
CRP SERPL-MCNC: 5.94 MG/L
EGFRCR SERPLBLD CKD-EPI 2021: 62 ML/MIN/1.73M2
EOSINOPHIL # BLD AUTO: 0.4 10E3/UL (ref 0–0.7)
EOSINOPHIL NFR BLD AUTO: 5 %
ERYTHROCYTE [DISTWIDTH] IN BLOOD BY AUTOMATED COUNT: 14.1 % (ref 10–15)
ERYTHROCYTE [SEDIMENTATION RATE] IN BLOOD BY WESTERGREN METHOD: 7 MM/HR (ref 0–20)
HCT VFR BLD AUTO: 39.1 % (ref 35–47)
HGB BLD-MCNC: 13.3 G/DL (ref 11.7–15.7)
IMM GRANULOCYTES # BLD: 0 10E3/UL
IMM GRANULOCYTES NFR BLD: 0 %
LYMPHOCYTES # BLD AUTO: 3.3 10E3/UL (ref 0.8–5.3)
LYMPHOCYTES NFR BLD AUTO: 43 %
MCH RBC QN AUTO: 32.8 PG (ref 26.5–33)
MCHC RBC AUTO-ENTMCNC: 34 G/DL (ref 31.5–36.5)
MCV RBC AUTO: 96 FL (ref 78–100)
MONOCYTES # BLD AUTO: 0.7 10E3/UL (ref 0–1.3)
MONOCYTES NFR BLD AUTO: 9 %
NEUTROPHILS # BLD AUTO: 3.2 10E3/UL (ref 1.6–8.3)
NEUTROPHILS NFR BLD AUTO: 42 %
PLATELET # BLD AUTO: 181 10E3/UL (ref 150–450)
PROT SERPL-MCNC: 6.9 G/DL (ref 6.4–8.3)
RBC # BLD AUTO: 4.06 10E6/UL (ref 3.8–5.2)
VIT D+METAB SERPL-MCNC: 33 NG/ML (ref 20–50)
WBC # BLD AUTO: 7.6 10E3/UL (ref 4–11)

## 2025-03-09 PROCEDURE — 85025 COMPLETE CBC W/AUTO DIFF WBC: CPT

## 2025-03-09 PROCEDURE — 36415 COLL VENOUS BLD VENIPUNCTURE: CPT

## 2025-03-09 PROCEDURE — 82306 VITAMIN D 25 HYDROXY: CPT

## 2025-03-09 PROCEDURE — 86140 C-REACTIVE PROTEIN: CPT

## 2025-03-09 PROCEDURE — 85652 RBC SED RATE AUTOMATED: CPT

## 2025-03-09 PROCEDURE — 82565 ASSAY OF CREATININE: CPT

## 2025-03-09 PROCEDURE — 80076 HEPATIC FUNCTION PANEL: CPT

## 2025-03-10 ENCOUNTER — PATIENT OUTREACH (OUTPATIENT)
Dept: CARE COORDINATION | Facility: CLINIC | Age: 45
End: 2025-03-10
Payer: COMMERCIAL

## 2025-03-11 ENCOUNTER — OFFICE VISIT (OUTPATIENT)
Dept: RHEUMATOLOGY | Facility: CLINIC | Age: 45
End: 2025-03-11
Payer: COMMERCIAL

## 2025-03-11 VITALS
BODY MASS INDEX: 39.75 KG/M2 | SYSTOLIC BLOOD PRESSURE: 139 MMHG | OXYGEN SATURATION: 98 % | WEIGHT: 277 LBS | RESPIRATION RATE: 16 BRPM | HEART RATE: 96 BPM | DIASTOLIC BLOOD PRESSURE: 81 MMHG

## 2025-03-11 DIAGNOSIS — M06.09 RHEUMATOID ARTHRITIS OF MULTIPLE SITES WITH NEGATIVE RHEUMATOID FACTOR (H): Primary | ICD-10-CM

## 2025-03-11 DIAGNOSIS — Z79.899 HIGH RISK MEDICATION USE: ICD-10-CM

## 2025-03-11 PROCEDURE — 99214 OFFICE O/P EST MOD 30 MIN: CPT | Performed by: INTERNAL MEDICINE

## 2025-03-11 PROCEDURE — G2211 COMPLEX E/M VISIT ADD ON: HCPCS | Performed by: INTERNAL MEDICINE

## 2025-03-11 RX ORDER — ADALIMUMAB-AQVH 40 MG/.8ML
40 INJECTION, SOLUTION SUBCUTANEOUS
Qty: 4.8 ML | Refills: 2 | Status: SHIPPED | OUTPATIENT
Start: 2025-03-11

## 2025-03-11 RX ORDER — METHOTREXATE 2.5 MG/1
15 TABLET ORAL WEEKLY
Qty: 78 TABLET | Refills: 2 | Status: SHIPPED | OUTPATIENT
Start: 2025-03-11

## 2025-03-11 RX ORDER — FOLIC ACID 1 MG/1
1 TABLET ORAL DAILY
Qty: 90 TABLET | Refills: 2 | Status: SHIPPED | OUTPATIENT
Start: 2025-03-11

## 2025-03-11 ASSESSMENT — PAIN SCALES - GENERAL: PAINLEVEL_OUTOF10: MILD PAIN (1)

## 2025-03-11 NOTE — NURSING NOTE
RAPID3 (0-30) Cumulative Score  2.0          RAPID3 Weighted Score (divide #4 by 3 and that is the weighted score)  0.66

## 2025-03-11 NOTE — PROGRESS NOTES
He  Rheumatology Clinic Visit      Vida Whitfield MRN# [id number]   YOB: 1980 Age: 44 year old      Date of visit: 3/11/25   PCP: Dr. Fco Medeiros  Ophthalmology: [facility] in [place], [place]    Chief Complaint   Patient presents with:  RECHECK: RA- states she is doing really good    Assessment and Plan     1. Seronegative nonerosive rheumatoid arthritis: Currently on methotrexate 15 mg once weekly (Cr elevation possibly secondary to MTX so MTX was reduced with improvement of Cr), folic acid 1 mg daily, sulfasalazine 500 mg twice daily, and adalimumab (started Humira 10/26/2023, effective; changed to Simlandi per insurance requirement in 2024, then changed to Yusimry on 2/7/2025 per insurance requirement).  Previously on hydroxychloroquine (discontinued when doing well on a combination of methotrexate/sulfasalazine/adalimumab).  Significant improvement since starting adalimumab so conventional DMARDs have been reduced over time.  No synovitis on exam today and doing well.  Discontinue sulfasalazine.  If doing well at follow-up consider reducing methotrexate.    Chronic illness, stable.    - Continue methotrexate 15mg once weekly  - Continue folic acid 1 mg daily  - Discontinue sulfasalazine 500 mg twice daily   - Continue Yusimry (adalimumab) 40 mg SQ every 14 days   - Avoid oral NSAIDs because they are historically triggers for urticaria and because of creatinine elevation; tolerates voltaren gel  - Labs in 3 months: CBC, Creatinine, Hepatic Panel, ESR, CRP    High risk medication requiring intensive toxicity monitoring at least quarterly.     # On birth control per patient    2.  History of bilateral patellofemoral syndrome: Previous steroid injections were effective; repeat injection not needed at this time.  Asymptomatic at this time    3.  Impingement syndrome of the left shoulder: 1 month duration.  Reviewed the diagnosis and treatment options.  She would like to try topical Voltaren gel,  rest, ice, and if not improving then consider physical therapy and if still not improving then consider steroid injection.  Physical therapy referral given today in case it is needed.    4. Urticaria: Historically, oral NSAIDs are triggers.  Documented here for clinical significance only.  Not managed in this clinic.    5. Bone health: vitamin D 1000 IU daily; recheck lab  - Continue vitamin D 1000 IU daily (does not take regularly but she says if it is a prescription then it may help compliance so vitamin D was prescribed today)  - Lab in 3 months: Vitamin D    6.  Vaccinations: Vaccinations reviewed with Ms. Whitfield.     - Influenza: up to date per patient  - [id number]: up to date  - [id number]: up to date  - [id number]: Anticipate receiving 2026  - COVID-19: Up to date; additional vaccinations refused by patient    Total minutes spent in evaluation with patient, documentation, , and review of pertinent studies and chart notes: 12  The longitudinal plan of care for the rheumatology problem(s) were addressed during this visit.  Due to added complexity of care, we will continue to support the patient and the subsequent management of this condition with ongoing continuity of care.      Ms. Whitfield verbalized agreement with and understanding of the rational for the diagnosis and treatment plan.  All questions were answered to best of my ability and the patient's satisfaction. Ms. Whitfield was advised to contact the clinic with any questions that may arise after the clinic visit.      Thank you for involving me in the care of the patient    Return to clinic: 3 months    HPI   Vida Whitfield is a 44 year old female with a medical history significant for anxiety, depression, chronic idiopathic urticaria, hyperlipidemia, hypothyroidism, rosacea, obstructive sleep apnea, and inflammatory arthritis who presents for follow-up of rheumatoid arthritis.    Historical records in this paragraph: She was previously evaluated by  Dr. Blaine Anthony at [facility].  2014 labs show negative: hepatitis C ab, HBV surface ag, HBV core ab, HLA-B27, Sm, RNP, SSA, SSB, Scl-70, DNA, cardiolipin, CCP, PR3, MPO. NIKKIE 1:160 homogeneous.  MPO also positive (one negative and one positive value). Per a 3/11/2016 clinic note, she has seronegative rheumatoid arthritis and chronic urticaria. She developed joint swelling and stiffness in the bilateral MCPs, MTPs, ankles, and knees that started in November 2013 shortly after tapering off prednisone that was used to manage chronic urticaria. No history of psoriasis or inflammatory bowel disease. Negative SI joint x-rays. Negative MRI of the SI joint. Flaring of her urticaria with NSAIDs. Steroid responsive in regard to her joints. Near resolution of joint stiffness and pain with methotrexate. NSAIDs are avoided because they cause flares of her urticaria.    1/23/2023: In the last 1 week she has had more pain at the PIPs > MCPs > left ankle.  No swelling of these joints.  Morning stiffness for about 1 hour.  Pain and stiffness is worse in the morning and improves with time and activity.  She attributes the increased joint pain to the weather.    4/21/2023: The prednisone course given in January 2023 was effective and resolved the inflammatory arthritis symptoms at the ankles, PIPs, and MCPs.  She then did well for a while but for the past 2 weeks has had right Achilles enthesitis pain that was evaluated by podiatry and she was placed on prednisone with improvement of the Achilles enthesitis, but no other improvement of her joints because they were already doing well.  She is also going to use a walking boot from podiatry.    7/21/2023: increased pain and stiffness of the knees in the morning that improves with walking around.  Knees bothered with going up and down stairs.  Typically sleeps with knees bent.  Knees without swelling, increased warmth, or overlying erythema.  Knees more symptomatic for about 1.5  months.  Ankles stiff in the morning; duration of about 1 hour, similar to the knees but resolves quicker; duration of 1.5 months.  Heel pain on the right; improved but not resolved; walking boot with some improvement.    10/24/2023: Pain of the right second and third MCPs, left wrist, and both shoulders; pain is worse in the morning and improves with time and activity.  Morning stiffness for 2 hours; positive gelling phenomenon.  Still with pain at the right Achilles enthesis that is better with wearing supportive shoes that have adequate padding     1/30/2024: Significant improvement with addition of Humira.  No injection site issues.  Right Achilles enthesitis still mildly active, but possibly improved.  Other joint symptoms significantly improved.  Morning stiffness for about 30-60 minutes.  No joint swelling.  Arthritis does not limit daily activities.    5/20/2024: Currently doing well.  No joint pain or swelling.  No morning stiffness or gelling phenomenon.  States that she has felt well ever since starting Humira.  Has an eye exam for hydroxychloroquine toxicity monitoring scheduled in 2 weeks.    8/29/2024: RA controlled.  No joint pain or swelling.  No morning stiffness or gelling phenomenon.  Only with right Achilles tendon pain on occasion, typically only with overuse, and resolves with rest.  No swelling of the right Achilles enthesis.  No worsening arthritis symptoms since stopping hydroxychloroquine.    12/5/2024: No worsening arthritis symptoms with sulfasalazine dose reduction until 1 month ago when she developed left shoulder pain that is worse with abduction above 90 degrees but any over the head activity worsens her shoulder symptoms.  No other joint pain.  No morning stiffness or joint phenomenon.    Today, 3/11/2025: States that she feels great.  No joint pain or swelling.  No morning stiffness or gelling phenomenon.  Arthritis not limiting daily activities.    No fevers or chills.  No nausea  or vomiting.  No blood in her stool.  No oral or nasal sores.  No chest pain/pressure, palpitations, or shortness of breath.  No cough.  No eye pain or redness.    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  Occasionally works with a chiropractor to draw blood for PRP injections    ROS   12 point review of system was completed and negative except as noted in the HPI     Active Problem List     Patient Active Problem List   Diagnosis    Contraceptive management    Hyperhidrosis of axilla    CARDIOVASCULAR SCREENING; LDL GOAL LESS THAN 160    Generalized anxiety disorder    Moderate recurrent major depression (H)    Chronic idiopathic urticaria    Hyperlipidemia with target LDL less than 130    Hypothyroidism, unspecified hypothyroidism type    Rosacea    Non morbid obesity due to excess calories    BRIDGETTE (obstructive sleep apnea)    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 factor (H)    High risk medication use    Obesity (BMI 35.0-39.9) with comorbidity (H)    Hemorrhagic condition, unspecified    Screening for cervical cancer     Past Medical History     Past Medical History:   Diagnosis Date    Arthritis 01/01/2013    Depressive disorder 01/01/2003    Peripheral autonomic neuropathy in disorders classified elsewhere(337.1)     Pure hypercholesterolemia     Thyroid disease 01/01/1995     Past Surgical History     Past Surgical History:   Procedure Laterality Date    CHOLECYSTECTOMY, LAPOROSCOPIC  2006    Cholecystectomy, Laparoscopic    SURGICAL HISTORY OF -   17 years old    Thyroid ablation    ZZC APPENDECTOMY  2002     Allergy     Allergies   Allergen Reactions    Macrobid [Nitrofuran Derivatives]     Paxil [Paroxetine]      Anxiety        Zoloft      Mood changes     Current Medication List     Current Outpatient Medications   Medication Sig Dispense Refill     Adalimumab-aqvh (YUSIMRY) 40 MG/0.8ML SOAJ Inject 40 mg subcutaneously every 14 days. Hold if infection and seek medical attention. 4.8 mL 2    buPROPion (WELLBUTRIN XL) 150 MG 24 hr tablet Take 3 tablets by mouth once daily 270 tablet 3    busPIRone (BUSPAR) 15 MG tablet Take 1 tablet by mouth twice daily. 180 tablet 3    cetirizine (ZYRTEC) 10 MG tablet Take 20 mg by mouth daily.      diclofenac (VOLTAREN) 1 % GEL topical gel Apply 2 grams to hands four times daily using enclosed dosing card. 100 g 3    folic acid (FOLVITE) 1 MG tablet Take 1 tablet (1 mg) by mouth daily. 90 tablet 2    levonorgestrel-ethinyl estradiol (SEASONALE) 0.15-0.03 MG tablet Take 1 tablet by mouth daily. 91 tablet 3    levothyroxine (SYNTHROID/LEVOTHROID) 175 MCG tablet Take 1 tablet (175 mcg) by mouth daily. 90 tablet 0    methotrexate 2.5 MG tablet Take 6 tablets (15 mg) by mouth once a week. . Take all 6 tablets on the same day of each week. 78 tablet 2    Multiple Vitamin (MULTIVITAMIN) per tablet Take 1 tablet by mouth daily. 100 tablet 12    order for DME Equipment being ordered: CPAP 6-10 cm      traZODone (DESYREL) 50 MG tablet Take 1 to 2 tablets by mouth nightly at bedtime. 90 tablet 3    Vitamin D, Cholecalciferol, 25 MCG (1000 UT) CAPS Take 25 mcg by mouth daily. 90 capsule 2    ALPRAZolam (XANAX) 0.5 MG tablet Take 1 tablet (0.5 mg) by mouth 3 times daily as needed for anxiety (Patient not taking: Reported on 3/11/2025) 20 tablet 0     No current facility-administered medications for this visit.         Social History   See HPI    Family History     Family History   Problem Relation Age of Onset    Diabetes Mother     Hypertension Mother     Lipids Mother     Hyperlipidemia Mother     Hypertension Father     Lipids Father     Thyroid Disease Father     Cancer Father     Hyperlipidemia Father     Other Cancer Father         Gastric & Bone Cancer    Diabetes Maternal Grandmother     Diabetes Paternal Grandmother     Melanoma No  "family hx of        Physical Exam     Temp Readings from Last 3 Encounters:   01/30/24 98.3  F (36.8  C) (Tympanic)   12/27/23 98  F (36.7  C) (Tympanic)   04/21/23 98.6  F (37  C) (Tympanic)     BP Readings from Last 5 Encounters:   03/11/25 139/81   12/05/24 138/84   08/29/24 133/71   05/20/24 119/82   01/30/24 137/84     Pulse Readings from Last 1 Encounters:   03/11/25 96     Resp Readings from Last 1 Encounters:   03/11/25 16     Estimated body mass index is 39.75 kg/m  as calculated from the following:    Height as of 1/30/24: 1.778 m (5' 10\").    Weight as of this encounter: 125.6 kg (277 lb).    GEN: NAD  HEENT:  Anicteric, noninjected sclera. No obvious external lesions of the ear and nose. Hearing intact.  CV: S1, S2. RRR. No m/r/g  PULM: No increased work of breathing. CTA bilaterally   MSK: MCPs, PIPs, DIPs without swelling or tenderness to palpation.  Wrists without swelling or tenderness to palpation.  Elbows and shoulders without swelling or tenderness to palpation. Knees, ankles, and MTPs without swelling or tenderness to palpation.   SKIN: No rash or jaundice seen  PSYCH: Alert. Appropriate.      Labs / Imaging (select studies)     CBC  Recent Labs   Lab Test 03/09/25  1021 12/01/24  1127 08/27/24  1531 10/24/21  1057 06/24/21  0721 12/16/20  1826 11/16/20  1514 10/15/20  1434   WBC 7.6 7.4 8.8   < > 6.8 6.6   < > 7.0   RBC 4.06 3.98 4.15   < > 4.17 4.05   < > 3.84   HGB 13.3 12.8 13.3   < > 13.1 13.0   < > 12.2   HCT 39.1 37.7 39.2   < > 38.1 38.3   < > 35.8   MCV 96 95 95   < > 91 95   < > 93   RDW 14.1 14.4 14.3   < > 13.1 12.9   < > 12.3    166 171   < > 182 175   < > 170   MCH 32.8 32.2 32.0   < > 31.4 32.1   < > 31.8   MCHC 34.0 34.0 33.9   < > 34.4 33.9   < > 34.1   NEUTROPHIL 42 34 47   < > 49.0 45.7  --  44.7   LYMPH 43 48 37   < > 40.5 43.1  --  44.8   MONOCYTE 9 9 9   < > 8.3 10.4  --  10.0   EOSINOPHIL 5 9 7   < > 1.8 0.3  --  0.1   BASOPHIL 0 1 1   < > 0.4 0.5  --  0.4   ANEU  -- "   --   --   --  3.3 3.0  --  3.1   ALYM  --   --   --   --  2.7 2.8  --  3.1   FREDA  --   --   --   --  0.6 0.7  --  0.7   AEOS  --   --   --   --  0.1 0.0  --  0.0   ABAS  --   --   --   --  0.0 0.0  --  0.0   ANEUTAUTO 3.2 2.5 4.1   < >  --   --   --   --    ALYMPAUTO 3.3 3.6 3.3   < >  --   --   --   --    AMONOAUTO 0.7 0.7 0.8   < >  --   --   --   --    AEOSAUTO 0.4 0.7 0.6   < >  --   --   --   --    ABSBASO 0.0 0.1 0.1   < >  --   --   --   --     < > = values in this interval not displayed.     CMP  Recent Labs   Lab Test 03/09/25  1021 12/01/24  1127 08/27/24  1531 01/23/24  1602 12/27/23  0739 10/24/21  1057 06/24/21  0721 12/16/20  1826 10/15/20  1434 12/17/19  1640 10/19/19  1041 12/10/18  1900 09/23/18  1058   GLC  --   --   --   --  88  --   --   --   --   --  81  --  79   CR 1.12* 1.01* 1.10*   < >  --    < > 1.03 0.99 0.97   < >  --    < >  --    GFRESTIMATED 62 70 64   < >  --    < > 68 71 73   < >  --    < >  --    GFRESTBLACK  --   --   --   --   --   --  79 83 85   < >  --    < >  --    BILITOTAL 0.4 0.5 0.4   < >  --    < > 0.6 0.3 0.4   < >  --    < >  --    ALBUMIN 4.0 4.1 4.2   < >  --    < > 3.3* 3.8 3.5   < >  --    < >  --    PROTTOTAL 6.9 6.8 7.1   < >  --    < > 6.8 7.3 7.2   < >  --    < >  --    ALKPHOS 38* 40 45   < >  --    < > 47 57 45   < >  --    < >  --    AST 26 26 24   < >  --    < > 17 16 19   < >  --    < >  --    ALT 20 19 22   < >  --    < > 21 23 22   < >  --    < >  --     < > = values in this interval not displayed.     Calcium/VitaminD  Recent Labs   Lab Test 03/09/25  1021 01/30/24  1105   VITDT 33 17*     ESR/CRP  Recent Labs   Lab Test 03/09/25  1021 12/01/24  1127 08/27/24  1531 01/19/23  1615 07/22/22  1528 01/25/22  1508 10/24/21  1057   SED 7 6 7   < > 8 7 7   CRP  --   --   --   --  7.8 8.6* 6.7   CRPI 5.94* 4.49 5.33*   < >  --   --   --     < > = values in this interval not displayed.     Lipid Panel  Recent Labs   Lab Test 12/27/23  0739 10/19/19  1041  "09/23/18  1058   CHOL 160 187 166   TRIG 160* 80 91   HDL 52 58 54   LDL 76 113* 94   NHDL 108 129 112     Hepatitis B  Recent Labs   Lab Test 07/18/23  1527   HBCAB Nonreactive   HEPBANG Nonreactive     Hepatitis C  Recent Labs   Lab Test 07/18/23  1527   HCVAB Nonreactive     Lyme ab screening  Recent Labs   Lab Test 10/24/23  0827   LYMEGM 0.11     Lyme confirmation testing by Western Blot  No results for input(s): \"LYWG\", \"LYWM\" in the last 08730 hours.  Tuberculosis Screening  Recent Labs   Lab Test 10/24/23  0827   TBRES Negative     HIV Screening  Recent Labs   Lab Test 01/23/24  1602 10/24/23  0827   HIAGAB Nonreactive Nonreactive           --------------------------------------------------    From care everywhere:      Immunization History     Immunization History   Administered Date(s) Administered    COVID-19 Monovalent 18+ (Moderna) 08/18/2021, 09/15/2021    Flu, Unspecified 10/22/2013, 10/03/2014    H1t8-32 Novel Flu- Nasal 10/21/2009    HepB 07/22/1999    Hepatitis B, Adult 07/22/1999, 08/25/1999, 01/28/2000    Historical DTP/aP 1980, 1980, 1980, 11/02/1982    Influenza (IIV3) PF 11/05/1996, 10/19/2010, 10/31/2019    Influenza (prior to 2024) 10/30/2006, 11/02/2007, 10/21/2008, 10/12/2009    Influenza Vaccine >6 months,quad, PF 10/18/2018, 10/31/2019, 10/29/2020, 10/14/2021, 10/19/2022    MMR 09/07/1992, 08/05/1993    Mantoux Tuberculin Skin Test 02/03/2012, 04/04/2012, 09/30/2014, 10/07/2014    OPV, trivalent, live 1981, 1981, 1981, 11/02/1982    Pneumo Conj 13-V (2010&after) 09/20/2016    Pneumococcal 23 valent 12/20/2016    TD,PF 7+ (Tenivac) 07/14/1995, 01/17/2006    TDAP (Adacel,Boostrix) 12/10/2013, 12/27/2023            Chart documentation done in part with Dragon Voice recognition Software. Although reviewed after completion, some word and grammatical error may remain.      Andrea Benítez MD    "

## 2025-03-17 DIAGNOSIS — E03.9 HYPOTHYROIDISM, UNSPECIFIED TYPE: ICD-10-CM

## 2025-03-17 RX ORDER — LEVOTHYROXINE SODIUM 175 UG/1
175 TABLET ORAL DAILY
Qty: 90 TABLET | Refills: 2 | Status: SHIPPED | OUTPATIENT
Start: 2025-03-17

## 2025-03-26 ENCOUNTER — MYC MEDICAL ADVICE (OUTPATIENT)
Dept: FAMILY MEDICINE | Facility: CLINIC | Age: 45
End: 2025-03-26
Payer: COMMERCIAL

## 2025-06-11 ENCOUNTER — HOSPITAL ENCOUNTER (OUTPATIENT)
Dept: MAMMOGRAPHY | Facility: CLINIC | Age: 45
Discharge: HOME | End: 2025-06-11
Attending: FAMILY MEDICINE
Payer: COMMERCIAL

## 2025-06-11 DIAGNOSIS — Z12.31 VISIT FOR SCREENING MAMMOGRAM: ICD-10-CM

## 2025-06-11 PROCEDURE — 77063 BREAST TOMOSYNTHESIS BI: CPT

## 2025-06-12 ENCOUNTER — LAB (OUTPATIENT)
Dept: LAB | Facility: CLINIC | Age: 45
End: 2025-06-12
Payer: COMMERCIAL

## 2025-06-12 DIAGNOSIS — Z79.899 HIGH RISK MEDICATION USE: ICD-10-CM

## 2025-06-12 DIAGNOSIS — M06.09 RHEUMATOID ARTHRITIS OF MULTIPLE SITES WITH NEGATIVE RHEUMATOID FACTOR (H): ICD-10-CM

## 2025-06-12 LAB
ALBUMIN SERPL BCG-MCNC: 3.9 G/DL (ref 3.5–5.2)
ALP SERPL-CCNC: 41 U/L (ref 40–150)
ALT SERPL W P-5'-P-CCNC: 24 U/L (ref 0–50)
AST SERPL W P-5'-P-CCNC: 30 U/L (ref 0–45)
BASOPHILS # BLD AUTO: 0.1 10E3/UL (ref 0–0.2)
BASOPHILS NFR BLD AUTO: 1 %
BILIRUB DIRECT SERPL-MCNC: 0.09 MG/DL (ref 0–0.3)
BILIRUB SERPL-MCNC: 0.3 MG/DL
CREAT SERPL-MCNC: 1 MG/DL (ref 0.51–0.95)
CRP SERPL-MCNC: 7.63 MG/L
EGFRCR SERPLBLD CKD-EPI 2021: 71 ML/MIN/1.73M2
EOSINOPHIL # BLD AUTO: 0.4 10E3/UL (ref 0–0.7)
EOSINOPHIL NFR BLD AUTO: 5 %
ERYTHROCYTE [DISTWIDTH] IN BLOOD BY AUTOMATED COUNT: 14.1 % (ref 10–15)
ERYTHROCYTE [SEDIMENTATION RATE] IN BLOOD BY WESTERGREN METHOD: 9 MM/HR (ref 0–20)
HCT VFR BLD AUTO: 38.3 % (ref 35–47)
HGB BLD-MCNC: 13 G/DL (ref 11.7–15.7)
IMM GRANULOCYTES # BLD: 0 10E3/UL
IMM GRANULOCYTES NFR BLD: 0 %
LYMPHOCYTES # BLD AUTO: 4.3 10E3/UL (ref 0.8–5.3)
LYMPHOCYTES NFR BLD AUTO: 50 %
MCH RBC QN AUTO: 30.9 PG (ref 26.5–33)
MCHC RBC AUTO-ENTMCNC: 33.9 G/DL (ref 31.5–36.5)
MCV RBC AUTO: 91 FL (ref 78–100)
MONOCYTES # BLD AUTO: 0.9 10E3/UL (ref 0–1.3)
MONOCYTES NFR BLD AUTO: 11 %
NEUTROPHILS # BLD AUTO: 2.9 10E3/UL (ref 1.6–8.3)
NEUTROPHILS NFR BLD AUTO: 34 %
PLATELET # BLD AUTO: 194 10E3/UL (ref 150–450)
PROT SERPL-MCNC: 6.9 G/DL (ref 6.4–8.3)
RBC # BLD AUTO: 4.21 10E6/UL (ref 3.8–5.2)
WBC # BLD AUTO: 8.6 10E3/UL (ref 4–11)

## 2025-06-16 DIAGNOSIS — M06.09 RHEUMATOID ARTHRITIS OF MULTIPLE SITES WITH NEGATIVE RHEUMATOID FACTOR (H): ICD-10-CM

## 2025-06-16 RX ORDER — FAMOTIDINE 20 MG
25 TABLET ORAL DAILY
Qty: 90 CAPSULE | Refills: 2 | OUTPATIENT
Start: 2025-06-16

## 2025-06-16 NOTE — TELEPHONE ENCOUNTER
Vit D 1000IU      Last Written Prescription Date:  12-05-24  Last Fill Quantity: 90,   # refills: 2  Last Office Visit: 03-11-25  Future Office visit:       Routing refill request to provider for review/approval because:  Medication is reported/historical  Pending Prescriptions:                       Disp   Refills    Vitamin D (Cholecalciferol) 25 MCG (1000 *90 cap*2            Sig: Take 25 mcg by mouth daily.   Mariella García CMA 6/16/2025 1:26 PM

## 2025-06-17 ENCOUNTER — OFFICE VISIT (OUTPATIENT)
Dept: RHEUMATOLOGY | Facility: CLINIC | Age: 45
End: 2025-06-17
Payer: COMMERCIAL

## 2025-06-17 VITALS
BODY MASS INDEX: 40.61 KG/M2 | RESPIRATION RATE: 16 BRPM | HEART RATE: 96 BPM | OXYGEN SATURATION: 99 % | SYSTOLIC BLOOD PRESSURE: 122 MMHG | WEIGHT: 283 LBS | DIASTOLIC BLOOD PRESSURE: 60 MMHG

## 2025-06-17 DIAGNOSIS — M06.09 RHEUMATOID ARTHRITIS OF MULTIPLE SITES WITH NEGATIVE RHEUMATOID FACTOR (H): Primary | ICD-10-CM

## 2025-06-17 DIAGNOSIS — Z79.899 HIGH RISK MEDICATION USE: ICD-10-CM

## 2025-06-17 PROCEDURE — G2211 COMPLEX E/M VISIT ADD ON: HCPCS | Performed by: INTERNAL MEDICINE

## 2025-06-17 PROCEDURE — 99214 OFFICE O/P EST MOD 30 MIN: CPT | Performed by: INTERNAL MEDICINE

## 2025-06-17 RX ORDER — METHOTREXATE 2.5 MG/1
12.5 TABLET ORAL WEEKLY
Qty: 60 TABLET | Refills: 2 | Status: SHIPPED | OUTPATIENT
Start: 2025-06-17

## 2025-06-17 NOTE — PATIENT INSTRUCTIONS
RHEUMATOLOGY    [facility] [place]  [address] [place] [address]  [place] [address]    Phone number: [phone]  Fax number: [phone]    If you need a medication refill, please contact us as you may need lab work and/or a follow up visit prior to your refill.      Thank you for choosing [facility]!    Nicolette Sarmiento CMA Rheumatology

## 2025-06-17 NOTE — PROGRESS NOTES
Rheumatology Clinic Visit      Vida Whitfield MRN# [id number]   YOB: 1980 Age: 45 year old      Date of visit: 6/17/25   PCP: Dr. Fco Medeiros  Ophthalmology: [facility] in [place], [place]    Chief Complaint   Patient presents with:  RECHECK: Rheumatoid arthritis    Assessment and Plan     1. Seronegative nonerosive rheumatoid arthritis: Currently on methotrexate 15 mg once weekly (Cr elevation possibly secondary to MTX so MTX was reduced with improvement of Cr), folic acid 1 mg daily, and adalimumab (started Humira 10/26/2023, effective; changed to Simlandi per insurance requirement in 2024, then changed to Yusimry on 2/7/2025 per insurance requirement).  Previously on hydroxychloroquine (discontinued when doing well on a combination of methotrexate/sulfasalazine/adalimumab), sulfasalazine (stopped when doing well, could use again in the future if needed).  Significant improvement since starting adalimumab so conventional DMARDs have been reduced over time.  No synovitis on exam today and doing well.  Does have mild tenderness without synovial swelling of the left third PIP that only bothers her when pressure is applied but not when using it and no stiffness in the joint.  Reduce methotrexate today since she is doing well. Chronic illness, stable.    - Reduce methotrexate from 15mg once weekly, to 12.5 mg once weekly  - Continue folic acid 1 mg daily  - Continue Yusimry (adalimumab) 40 mg SQ every 14 days   - Avoid oral NSAIDs because they are historically triggers for urticaria and because of creatinine elevation; tolerates voltaren gel  - Labs in 3 months: CBC, Creatinine, Hepatic Panel, ESR, CRP    High risk medication requiring intensive toxicity monitoring at least quarterly.     # On birth control per patient    2.  History of bilateral patellofemoral syndrome: Previous steroid injections were effective; repeat injection not needed at this time.  Asymptomatic at this time    3.   Impingement syndrome of the left shoulder: 1 month duration.  Reviewed the diagnosis and treatment options.  She would like to try topical Voltaren gel, rest, ice, and if not improving then consider physical therapy and if still not improving then consider steroid injection.  Physical therapy referral given today in case it is needed.    4. Urticaria: Historically, oral NSAIDs are triggers.  Documented here for clinical significance only.  Not managed in this clinic.    5. Bone health: vitamin D 1000 IU daily; recheck lab  - Continue vitamin D 1000 IU daily (does not take regularly but she says if it is a prescription then it may help compliance so vitamin D was prescribed today)    6.  Vaccinations: Vaccinations reviewed with Ms. Whitfield.     - Influenza: up to date per patient  - [id number]: up to date  - [id number]: up to date  - [id number]: Anticipate receiving 2026  - COVID-19: Up to date; additional vaccinations refused by patient    Total minutes spent in evaluation with patient, documentation, , and review of pertinent studies and chart notes: 12  The longitudinal plan of care for the rheumatology problem(s) were addressed during this visit.  Due to added complexity of care, we will continue to support the patient and the subsequent management of this condition with ongoing continuity of care.      Ms. Whitfield verbalized agreement with and understanding of the rational for the diagnosis and treatment plan.  All questions were answered to best of my ability and the patient's satisfaction. Ms. Whitfield was advised to contact the clinic with any questions that may arise after the clinic visit.      Thank you for involving me in the care of the patient    Return to clinic: 3 months    HPI   Vidamary Whitfield is a 45 year old female with a medical history significant for anxiety, depression, chronic idiopathic urticaria, hyperlipidemia, hypothyroidism, rosacea, obstructive sleep apnea, and inflammatory arthritis  who presents for follow-up of rheumatoid arthritis.    Historical records in this paragraph: She was previously evaluated by Dr. Blaine Anthony at [facility].  2014 labs show negative: hepatitis C ab, HBV surface ag, HBV core ab, HLA-B27, Sm, RNP, SSA, SSB, Scl-70, DNA, cardiolipin, CCP, PR3, MPO. NIKKIE 1:160 homogeneous.  MPO also positive (one negative and one positive value). Per a 3/11/2016 clinic note, she has seronegative rheumatoid arthritis and chronic urticaria. She developed joint swelling and stiffness in the bilateral MCPs, MTPs, ankles, and knees that started in November 2013 shortly after tapering off prednisone that was used to manage chronic urticaria. No history of psoriasis or inflammatory bowel disease. Negative SI joint x-rays. Negative MRI of the SI joint. Flaring of her urticaria with NSAIDs. Steroid responsive in regard to her joints. Near resolution of joint stiffness and pain with methotrexate. NSAIDs are avoided because they cause flares of her urticaria.    1/23/2023: In the last 1 week she has had more pain at the PIPs > MCPs > left ankle.  No swelling of these joints.  Morning stiffness for about 1 hour.  Pain and stiffness is worse in the morning and improves with time and activity.  She attributes the increased joint pain to the weather.    4/21/2023: The prednisone course given in January 2023 was effective and resolved the inflammatory arthritis symptoms at the ankles, PIPs, and MCPs.  She then did well for a while but for the past 2 weeks has had right Achilles enthesitis pain that was evaluated by podiatry and she was placed on prednisone with improvement of the Achilles enthesitis, but no other improvement of her joints because they were already doing well.  She is also going to use a walking boot from podiatry.    7/21/2023: increased pain and stiffness of the knees in the morning that improves with walking around.  Knees bothered with going up and down stairs.  Typically sleeps  with knees bent.  Knees without swelling, increased warmth, or overlying erythema.  Knees more symptomatic for about 1.5 months.  Ankles stiff in the morning; duration of about 1 hour, similar to the knees but resolves quicker; duration of 1.5 months.  Heel pain on the right; improved but not resolved; walking boot with some improvement.    10/24/2023: Pain of the right second and third MCPs, left wrist, and both shoulders; pain is worse in the morning and improves with time and activity.  Morning stiffness for 2 hours; positive gelling phenomenon.  Still with pain at the right Achilles enthesis that is better with wearing supportive shoes that have adequate padding     1/30/2024: Significant improvement with addition of Humira.  No injection site issues.  Right Achilles enthesitis still mildly active, but possibly improved.  Other joint symptoms significantly improved.  Morning stiffness for about 30-60 minutes.  No joint swelling.  Arthritis does not limit daily activities.    5/20/2024: Currently doing well.  No joint pain or swelling.  No morning stiffness or gelling phenomenon.  States that she has felt well ever since starting Humira.  Has an eye exam for hydroxychloroquine toxicity monitoring scheduled in 2 weeks.    8/29/2024: RA controlled.  No joint pain or swelling.  No morning stiffness or gelling phenomenon.  Only with right Achilles tendon pain on occasion, typically only with overuse, and resolves with rest.  No swelling of the right Achilles enthesis.  No worsening arthritis symptoms since stopping hydroxychloroquine.    12/5/2024: No worsening arthritis symptoms with sulfasalazine dose reduction until 1 month ago when she developed left shoulder pain that is worse with abduction above 90 degrees but any over the head activity worsens her shoulder symptoms.  No other joint pain.  No morning stiffness or joint phenomenon.    3/11/2025: States that she feels great.  No joint pain or swelling.  No  morning stiffness or gelling phenomenon.  Arthritis not limiting daily activities.    Today, 6/17/2025: No worsening arthritis symptoms with sulfasalazine discontinuation.  Occasional pain of the left third PIP if she pushes on that joint but there is no swelling, did not worsen with sulfasalazine dose reduction or discontinuation.  Overall feels well with regard to arthritis.  Would like to try reducing methotrexate.    No fevers or chills.  No nausea or vomiting.  No blood in her stool.  No oral or nasal sores.  No chest pain/pressure or potation's.  Shortness of breath, except for symptoms when she is sitting but has no wheezing or any other symptoms associated with it and it resolves when she stands up and does not occur when she is exercising; she thinks it is because she is overweight and she is working on weight loss.  No eye pain or redness.        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  Occasionally works with a chiropractor to draw blood for PRP injections    ROS   12 point review of system was completed and negative except as noted in the HPI     Active Problem List     Patient Active Problem List   Diagnosis    Contraceptive management    Hyperhidrosis of axilla    CARDIOVASCULAR SCREENING; LDL GOAL LESS THAN 160    Generalized anxiety disorder    Moderate recurrent major depression (H)    Chronic idiopathic urticaria    Hyperlipidemia with target LDL less than 130    Hypothyroidism, unspecified hypothyroidism type    Rosacea    Non morbid obesity due to excess calories    BRIDGETTE (obstructive sleep apnea)    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 factor (H)    High risk medication use    Obesity (BMI 35.0-39.9) with comorbidity (H)    Hemorrhagic condition, unspecified    Screening for cervical cancer     Past Medical History     Past Medical  History:   Diagnosis Date    Arthritis 01/01/2013    Depressive disorder 01/01/2003    Peripheral autonomic neuropathy in disorders classified elsewhere(337.1)     Pure hypercholesterolemia     Thyroid disease 01/01/1995     Past Surgical History     Past Surgical History:   Procedure Laterality Date    CHOLECYSTECTOMY, LAPOROSCOPIC  2006    Cholecystectomy, Laparoscopic    SURGICAL HISTORY OF -   17 years old    Thyroid ablation    ZZC APPENDECTOMY  2002     Allergy     Allergies   Allergen Reactions    Macrobid [Nitrofuran Derivatives]     Paxil [Paroxetine]      Anxiety        Zoloft      Mood changes     Current Medication List     Current Outpatient Medications   Medication Sig Dispense Refill    Adalimumab-aqvh (YUSIMRY) 40 MG/0.8ML SOAJ Inject 40 mg subcutaneously every 14 days. Hold if infection and seek medical attention. 4.8 mL 2    buPROPion (WELLBUTRIN XL) 150 MG 24 hr tablet Take 3 tablets by mouth once daily 270 tablet 3    busPIRone (BUSPAR) 15 MG tablet Take 1 tablet by mouth twice daily. 180 tablet 3    cetirizine (ZYRTEC) 10 MG tablet Take 20 mg by mouth daily.      diclofenac (VOLTAREN) 1 % GEL topical gel Apply 2 grams to hands four times daily using enclosed dosing card. 100 g 3    folic acid (FOLVITE) 1 MG tablet Take 1 tablet (1 mg) by mouth daily. 90 tablet 2    levonorgestrel-ethinyl estradiol (SEASONALE) 0.15-0.03 MG tablet Take 1 tablet by mouth daily. 91 tablet 3    levothyroxine (SYNTHROID/LEVOTHROID) 175 MCG tablet Take 1 tablet by mouth daily. 90 tablet 2    methotrexate 2.5 MG tablet Take 6 tablets (15 mg) by mouth once a week. . Take all 6 tablets on the same day of each week. 78 tablet 2    Multiple Vitamin (MULTIVITAMIN) per tablet Take 1 tablet by mouth daily. 100 tablet 12    order for DME Equipment being ordered: CPAP 6-10 cm      traZODone (DESYREL) 50 MG tablet Take 1 to 2 tablets by mouth nightly at bedtime. 90 tablet 3    Vitamin D, Cholecalciferol, 25 MCG (1000 UT) CAPS  "Take 25 mcg by mouth daily. 90 capsule 2    ALPRAZolam (XANAX) 0.5 MG tablet Take 1 tablet (0.5 mg) by mouth 3 times daily as needed for anxiety (Patient not taking: Reported on 6/17/2025) 20 tablet 0     No current facility-administered medications for this visit.         Social History   See HPI    Family History     Family History   Problem Relation Age of Onset    Diabetes Mother     Hypertension Mother     Lipids Mother     Hyperlipidemia Mother     Hypertension Father     Lipids Father     Thyroid Disease Father     Cancer Father     Hyperlipidemia Father     Other Cancer Father         Gastric & Bone Cancer    Diabetes Maternal Grandmother     Diabetes Paternal Grandmother     Melanoma No family hx of        Physical Exam     Temp Readings from Last 3 Encounters:   01/30/24 98.3  F (36.8  C) (Tympanic)   12/27/23 98  F (36.7  C) (Tympanic)   04/21/23 98.6  F (37  C) (Tympanic)     BP Readings from Last 5 Encounters:   06/17/25 (!) 144/81   03/11/25 139/81   12/05/24 138/84   08/29/24 133/71   05/20/24 119/82     Pulse Readings from Last 1 Encounters:   06/17/25 96     Resp Readings from Last 1 Encounters:   06/17/25 16     Estimated body mass index is 40.61 kg/m  as calculated from the following:    Height as of 1/30/24: 1.778 m (5' 10\").    Weight as of this encounter: 128.4 kg (283 lb).    GEN: NAD  HEENT:  Anicteric, noninjected sclera. No obvious external lesions of the ear and nose. Hearing intact.  CV: S1, S2. RRR. No m/r/g  PULM: No increased work of breathing. CTA bilaterally   MSK: MCPs, PIPs, DIPs without swelling or tenderness to palpation, except for mild tenderness to palpation of the left third PIP.  Wrists without swelling or tenderness to palpation.  Elbows and shoulders without swelling or tenderness to palpation. Knees, ankles, and MTPs without swelling or tenderness to palpation.   SKIN: No rash or jaundice seen  PSYCH: Alert. Appropriate.      Labs / Imaging (select studies) "     CBC  Recent Labs   Lab Test 03/09/25  1021 12/01/24  1127 08/27/24  1531 10/24/21  1057 06/24/21  0721 12/16/20  1826 11/16/20  1514 10/15/20  1434   WBC 7.6 7.4 8.8   < > 6.8 6.6   < > 7.0   RBC 4.06 3.98 4.15   < > 4.17 4.05   < > 3.84   HGB 13.3 12.8 13.3   < > 13.1 13.0   < > 12.2   HCT 39.1 37.7 39.2   < > 38.1 38.3   < > 35.8   MCV 96 95 95   < > 91 95   < > 93   RDW 14.1 14.4 14.3   < > 13.1 12.9   < > 12.3    166 171   < > 182 175   < > 170   MCH 32.8 32.2 32.0   < > 31.4 32.1   < > 31.8   MCHC 34.0 34.0 33.9   < > 34.4 33.9   < > 34.1   NEUTROPHIL 42 34 47   < > 49.0 45.7  --  44.7   LYMPH 43 48 37   < > 40.5 43.1  --  44.8   MONOCYTE 9 9 9   < > 8.3 10.4  --  10.0   EOSINOPHIL 5 9 7   < > 1.8 0.3  --  0.1   BASOPHIL 0 1 1   < > 0.4 0.5  --  0.4   ANEU  --   --   --   --  3.3 3.0  --  3.1   ALYM  --   --   --   --  2.7 2.8  --  3.1   FREDA  --   --   --   --  0.6 0.7  --  0.7   AEOS  --   --   --   --  0.1 0.0  --  0.0   ABAS  --   --   --   --  0.0 0.0  --  0.0   ANEUTAUTO 3.2 2.5 4.1   < >  --   --   --   --    ALYMPAUTO 3.3 3.6 3.3   < >  --   --   --   --    AMONOAUTO 0.7 0.7 0.8   < >  --   --   --   --    AEOSAUTO 0.4 0.7 0.6   < >  --   --   --   --    ABSBASO 0.0 0.1 0.1   < >  --   --   --   --     < > = values in this interval not displayed.     CMP  Recent Labs   Lab Test 03/09/25  1021 12/01/24  1127 08/27/24  1531 01/23/24  1602 12/27/23  0739 10/24/21  1057 06/24/21  0721 12/16/20  1826 10/15/20  1434 12/17/19  1640 10/19/19  1041 12/10/18  1900 09/23/18  1058   GLC  --   --   --   --  88  --   --   --   --   --  81  --  79   CR 1.12* 1.01* 1.10*   < >  --    < > 1.03 0.99 0.97   < >  --    < >  --    GFRESTIMATED 62 70 64   < >  --    < > 68 71 73   < >  --    < >  --    GFRESTBLACK  --   --   --   --   --   --  79 83 85   < >  --    < >  --    BILITOTAL 0.4 0.5 0.4   < >  --    < > 0.6 0.3 0.4   < >  --    < >  --    ALBUMIN 4.0 4.1 4.2   < >  --    < > 3.3* 3.8 3.5   < >  --    <  ">  --    PROTTOTAL 6.9 6.8 7.1   < >  --    < > 6.8 7.3 7.2   < >  --    < >  --    ALKPHOS 38* 40 45   < >  --    < > 47 57 45   < >  --    < >  --    AST 26 26 24   < >  --    < > 17 16 19   < >  --    < >  --    ALT 20 19 22   < >  --    < > 21 23 22   < >  --    < >  --     < > = values in this interval not displayed.     Calcium/VitaminD  Recent Labs   Lab Test 03/09/25  1021 01/30/24  1105   VITDT 33 17*     ESR/CRP  Recent Labs   Lab Test 03/09/25  1021 12/01/24  1127 08/27/24  1531 01/19/23  1615 07/22/22  1528 01/25/22  1508 10/24/21  1057   SED 7 6 7   < > 8 7 7   CRP  --   --   --   --  7.8 8.6* 6.7   CRPI 5.94* 4.49 5.33*   < >  --   --   --     < > = values in this interval not displayed.     Lipid Panel  Recent Labs   Lab Test 12/27/23  0739 10/19/19  1041 09/23/18  1058   CHOL 160 187 166   TRIG 160* 80 91   HDL 52 58 54   LDL 76 113* 94   NHDL 108 129 112     Hepatitis B  Recent Labs   Lab Test 07/18/23  1527   HBCAB Nonreactive   HEPBANG Nonreactive     Hepatitis C  Recent Labs   Lab Test 07/18/23  1527   HCVAB Nonreactive     Lyme ab screening  Recent Labs   Lab Test 10/24/23  0827   LYMEGM 0.11     Lyme confirmation testing by Western Blot  No results for input(s): \"LYWG\", \"LYWM\" in the last 10873 hours.  Tuberculosis Screening  Recent Labs   Lab Test 10/24/23  0827   TBRES Negative     HIV Screening  Recent Labs   Lab Test 01/23/24  1602 10/24/23  0827   HIAGAB Nonreactive Nonreactive           --------------------------------------------------    From care everywhere:      Immunization History     Immunization History   Administered Date(s) Administered    COVID-19 Monovalent 18+ (Moderna) 08/18/2021, 09/15/2021    Flu, Unspecified 10/22/2013, 10/03/2014    [id number] Novel Flu- Nasal 10/21/2009    HepB 07/22/1999    Hepatitis B, Adult (Energix-B/Recombivax HB) 07/22/1999, 08/25/1999, 01/28/2000    Historical DTP/aP 1980, 1980, 1980, 11/02/1982    Influenza (IIV3) PF 11/05/1996, " 10/19/2010, 10/31/2019    Influenza (prior to 2024) 10/30/2006, 11/02/2007, 10/21/2008, 10/12/2009    Influenza Vaccine >6 months,quad, PF 10/18/2018, 10/31/2019, 10/29/2020, 10/14/2021, 10/19/2022    MMR (MMRII) 09/07/1992, 08/05/1993    Mantoux Tuberculin Skin Test 02/03/2012, 04/04/2012, 09/30/2014, 10/07/2014    OPV, trivalent, live 1981, 1980, 1981, 11/02/1982    Pneumo Conj 13-V (2010&after) 09/20/2016    Pneumococcal 23 valent 12/20/2016    TD,PF 7+ (Tenivac) 07/14/1995, 01/17/2006    TDAP (Adacel,Boostrix) 12/10/2013, 12/27/2023            Chart documentation done in part with Dragon Voice recognition Software. Although reviewed after completion, some word and grammatical error may remain.      Andrea Benítez MD

## 2025-06-25 ENCOUNTER — MYC REFILL (OUTPATIENT)
Dept: RHEUMATOLOGY | Facility: CLINIC | Age: 45
End: 2025-06-25
Payer: COMMERCIAL

## 2025-06-25 DIAGNOSIS — M06.09 RHEUMATOID ARTHRITIS OF MULTIPLE SITES WITH NEGATIVE RHEUMATOID FACTOR (H): ICD-10-CM

## 2025-06-26 RX ORDER — FAMOTIDINE 20 MG
25 TABLET ORAL DAILY
Qty: 90 CAPSULE | Refills: 0 | Status: SHIPPED | OUTPATIENT
Start: 2025-06-26

## 2025-06-26 NOTE — TELEPHONE ENCOUNTER
After chart review and based on prior discussion with MD it was noted that this is appropriate for a refill.    TRACY PerezN RN Specialty Triage 6/26/2025 2:06 PM